# Patient Record
Sex: FEMALE | Race: WHITE | Employment: FULL TIME | URBAN - METROPOLITAN AREA
[De-identification: names, ages, dates, MRNs, and addresses within clinical notes are randomized per-mention and may not be internally consistent; named-entity substitution may affect disease eponyms.]

---

## 2017-03-10 DIAGNOSIS — G43.009 MIGRAINE WITHOUT AURA AND WITHOUT STATUS MIGRAINOSUS, NOT INTRACTABLE: ICD-10-CM

## 2017-03-10 RX ORDER — TOPIRAMATE 200 MG/1
TABLET ORAL
Qty: 75 TAB | Refills: 0 | Status: SHIPPED | OUTPATIENT
Start: 2017-03-10 | End: 2017-05-10 | Stop reason: SDUPTHER

## 2017-05-10 ENCOUNTER — OFFICE VISIT (OUTPATIENT)
Dept: NEUROLOGY | Age: 47
End: 2017-05-10

## 2017-05-10 VITALS
RESPIRATION RATE: 20 BRPM | BODY MASS INDEX: 34.22 KG/M2 | HEIGHT: 70 IN | WEIGHT: 239 LBS | SYSTOLIC BLOOD PRESSURE: 118 MMHG | DIASTOLIC BLOOD PRESSURE: 68 MMHG

## 2017-05-10 DIAGNOSIS — G43.009 MIGRAINE WITHOUT AURA AND WITHOUT STATUS MIGRAINOSUS, NOT INTRACTABLE: ICD-10-CM

## 2017-05-10 RX ORDER — ELETRIPTAN HYDROBROMIDE 40 MG/1
TABLET, FILM COATED ORAL
Qty: 36 TAB | Refills: 1 | Status: SHIPPED | OUTPATIENT
Start: 2017-05-10 | End: 2017-05-15 | Stop reason: SDUPTHER

## 2017-05-10 RX ORDER — TOPIRAMATE 200 MG/1
TABLET ORAL
Qty: 225 TAB | Refills: 1 | Status: SHIPPED | OUTPATIENT
Start: 2017-05-10 | End: 2017-05-15 | Stop reason: SDUPTHER

## 2017-05-10 NOTE — PROGRESS NOTES
Daniel Mathew is a 56 y.o. female who presents with the following  Chief Complaint   Patient presents with    Headache      2 headaches a month last 3 day       HPI Patient comes in for a follow up for migraines. Been having about 2 headaches a month lasting about 3 days each. They are located unilateral and on the left side. Pain is described as a sharp pain. She can get dizziness, light headed, nausea, but she works through them. She uses relpax to abortive therapy and this works well for the most part. She feels like her headaches are stable and have been like this for a while. Triggers are her lifestyle and job and she knows this. Allergies   Allergen Reactions    Topamax [Topiramate] Other (comments)     Low grade fever   Only with brand name ok with generic       Current Outpatient Prescriptions   Medication Sig    topiramate (TOPAMAX) 200 mg tablet TAKE 1 TABLET EVERY MORNINGAND TAKE 1 AND 1/2 TABLETS IN THE EVENING    eletriptan (RELPAX) 40 mg tablet TAKE 1 TABLET BY MOUTH ONCE AS NEEDED. MAY REPEAT IN 2 HOURS IF NEEDED    ferrous sulfate (IRON) 325 mg (65 mg iron) tablet Take  by mouth Daily (before breakfast).  HYDROcodone-acetaminophen (LORTAB) 5-500 mg per tablet Take  by mouth as needed.  multivitamin (ONE A DAY) tablet Take 1 tablet by mouth daily. No current facility-administered medications for this visit.         History   Smoking Status    Never Smoker   Smokeless Tobacco    Never Used       Past Medical History:   Diagnosis Date    Anemia     Back pain     Chronic pain     Headache     Headache     Stool color black        Past Surgical History:   Procedure Laterality Date    HX  SECTION      HX LUMBAR DISKECTOMY      L 5 S1    HX TUBAL LIGATION         Family History   Problem Relation Age of Onset    Suicide Father     Depression Father     Cancer Father      lung,    Depression Mother     Depression Sister     Hypertension Maternal Grandfather     Diabetes Maternal Grandfather     Cancer Paternal Grandmother      lung       Social History     Social History    Marital status:      Spouse name: N/A    Number of children: N/A    Years of education: N/A     Social History Main Topics    Smoking status: Never Smoker    Smokeless tobacco: Never Used    Alcohol use 1.5 oz/week     3 Glasses of wine per week      Comment: rare    Drug use: No    Sexual activity: Yes     Partners: Male     Birth control/ protection: Surgical     Other Topics Concern    None     Social History Narrative       Review of Systems   HENT: Negative for ear pain, hearing loss and tinnitus. Eyes: Positive for photophobia. Negative for blurred vision and double vision. Respiratory: Negative for shortness of breath and wheezing. Cardiovascular: Negative for chest pain and palpitations. Gastrointestinal: Positive for nausea. Negative for vomiting. Neurological: Positive for dizziness and headaches. Negative for weakness. Remainder of comprehensive review is negative. Physical Exam :    Visit Vitals    /68    Resp 20    Ht 5' 10\" (1.778 m)    Wt 108.4 kg (239 lb)    BMI 34.29 kg/m2       General: Well defined, nourished, and groomed individual in no acute distress.    Neck: Supple, nontender, no bruits, no pain with resistance to active range of motion.    Heart: Regular rate and rhythm, no murmurs, rub, or gallop. Normal S1S2. Lungs: Clear to auscultation bilaterally with equal chest expansion, no cough, no wheeze  Musculoskeletal: Extremities revealed no edema and had full range of motion of joints.    Psych: Good mood and bright affect    NEUROLOGICAL EXAMINATION:    Mental Status: Alert and oriented to person, place, and time    Cranial Nerves:    II, III, IV, VI: Visual acuity grossly intact. Visual fields are normal.    Pupils are equal, round, and reactive to light and accommodation.    Extra-ocular movements are full and fluid. Fundoscopic exam was benign, no ptosis or nystagmus.    V-XII: Hearing is grossly intact. Facial features are symmetric, with normal sensation and strength. The palate rises symmetrically and the tongue protrudes midline. Sternocleidomastoids 5/5. Motor Examination: Normal tone, bulk, and strength, 5/5 muscle strength throughout. Coordination: Finger to nose was normal. No resting or intention tremor    Gait and Station: Steady while walking. Normal arm swing. No pronator drift. No muscle wasting or fasiculations noted. Reflexes: DTRs 2+ throughout. Results for orders placed or performed during the hospital encounter of 07/31/16   D DIMER   Result Value Ref Range    D-dimer 0.60 0.00 - 0.65 mg/L FEU   TROPONIN I   Result Value Ref Range    Troponin-I, Qt. <0.04 <3.11 ng/mL   METABOLIC PANEL, COMPREHENSIVE   Result Value Ref Range    Sodium 141 136 - 145 mmol/L    Potassium 3.8 3.5 - 5.1 mmol/L    Chloride 109 (H) 97 - 108 mmol/L    CO2 22 21 - 32 mmol/L    Anion gap 10 5 - 15 mmol/L    Glucose 85 65 - 100 mg/dL    BUN 9 6 - 20 MG/DL    Creatinine 0.82 0.55 - 1.02 MG/DL    BUN/Creatinine ratio 11 (L) 12 - 20      GFR est AA >60 >60 ml/min/1.73m2    GFR est non-AA >60 >60 ml/min/1.73m2    Calcium 8.3 (L) 8.5 - 10.1 MG/DL    Bilirubin, total 0.1 (L) 0.2 - 1.0 MG/DL    ALT (SGPT) 21 12 - 78 U/L    AST (SGOT) 13 (L) 15 - 37 U/L    Alk.  phosphatase 80 45 - 117 U/L    Protein, total 6.9 6.4 - 8.2 g/dL    Albumin 3.3 (L) 3.5 - 5.0 g/dL    Globulin 3.6 2.0 - 4.0 g/dL    A-G Ratio 0.9 (L) 1.1 - 2.2     CBC WITH AUTOMATED DIFF   Result Value Ref Range    WBC 3.5 (L) 3.6 - 11.0 K/uL    RBC 4.27 3.80 - 5.20 M/uL    HGB 9.4 (L) 11.5 - 16.0 g/dL    HCT 32.1 (L) 35.0 - 47.0 %    MCV 75.2 (L) 80.0 - 99.0 FL    MCH 22.0 (L) 26.0 - 34.0 PG    MCHC 29.3 (L) 30.0 - 36.5 g/dL    RDW 15.7 (H) 11.5 - 14.5 %    PLATELET 818 807 - 812 K/uL    NEUTROPHILS 42 32 - 75 %    LYMPHOCYTES 47 12 - 49 %    MONOCYTES 9 5 - 13 % EOSINOPHILS 1 0 - 7 %    BASOPHILS 1 0 - 1 %    ABS. NEUTROPHILS 1.5 (L) 1.8 - 8.0 K/UL    ABS. LYMPHOCYTES 1.6 0.8 - 3.5 K/UL    ABS. MONOCYTES 0.3 0.0 - 1.0 K/UL    ABS. EOSINOPHILS 0.1 0.0 - 0.4 K/UL    ABS. BASOPHILS 0.0 0.0 - 0.1 K/UL   EKG, 12 LEAD, INITIAL   Result Value Ref Range    Ventricular Rate 78 BPM    Atrial Rate 78 BPM    P-R Interval 176 ms    QRS Duration 86 ms    Q-T Interval 386 ms    QTC Calculation (Bezet) 440 ms    Calculated P Axis 35 degrees    Calculated R Axis 7 degrees    Calculated T Axis 30 degrees    Diagnosis       Normal sinus rhythm  Normal ECG  When compared with ECG of 31-JUL-2016 11:49,  No significant change    Confirmed by Tomas Herrera MD ([id number]) on 7/31/2016 10:48:46 PM     EKG, 12 LEAD, INITIAL   Result Value Ref Range    Ventricular Rate 80 BPM    Atrial Rate 80 BPM    P-R Interval 164 ms    QRS Duration 86 ms    Q-T Interval 386 ms    QTC Calculation (Bezet) 445 ms    Calculated P Axis 30 degrees    Calculated R Axis 15 degrees    Calculated T Axis 49 degrees    Diagnosis       Normal sinus rhythm  Normal ECG  When compared with ECG of 30-AUG-2015 00:15,  No significant change was found  Confirmed by Kelvin Benjamin M.D., Nancyann Pointer ([id number]) on 8/1/2016 7:57:38 AM         Orders Placed This Encounter    topiramate (TOPAMAX) 200 mg tablet     Sig: TAKE 1 TABLET EVERY MORNINGAND TAKE 1 AND 1/2 TABLETS IN THE EVENING     Dispense:  225 Tab     Refill:  1    eletriptan (RELPAX) 40 mg tablet     Sig: TAKE 1 TABLET BY MOUTH ONCE AS NEEDED. MAY REPEAT IN 2 HOURS IF NEEDED     Dispense:  36 Tab     Refill:  1       1. Migraine without aura and without status migrainosus, not intractable        Follow-up Disposition:  Return in about 6 months (around 11/10/2017). Migraines doing well. Continue Topamax 200 mg in AM and 300 mg in PM for prevention. Been doing well. She has increased somewhat and this made her headaches worse. She will continue relpax for abortive therapy.  Continue to track headaches.          Maya Perez NP        This note will not be viewable in [address]

## 2017-05-10 NOTE — MR AVS SNAPSHOT
Visit Information Date & Time Provider Department Dept. Phone Encounter #  
 5/10/2017  8:30 AM Max Webber NP [facility] [phone] [id number] Follow-up Instructions Return in about 6 months (around 11/10/2017). Upcoming Health Maintenance Date Due  
 PAP AKA CERVICAL CYTOLOGY 5/1/1992 INFLUENZA AGE 9 TO ADULT 8/1/2017 DTaP/Tdap/Td series (2 - Td) 9/5/2022 Allergies as of 5/10/2017  Review Complete On: 8/11/2016 By: Chai Kerns LPN Severity Noted Reaction Type Reactions Topamax [Topiramate]  12/06/2010    Other (comments) Low grade fever Only with brand name ok with generic Current Immunizations  Reviewed on 1/9/2015 Name Date TDAP Vaccine 9/5/2012 Not reviewed this visit You Were Diagnosed With   
  
 Codes Comments Migraine without aura and without status migrainosus, not intractable     ICD-10-CM: Y21.578 ICD-9-CM: 346.10 Vitals BP Resp Height(growth percentile) Weight(growth percentile) BMI OB Status 118/68 20 5' 10\" (1.778 m) 239 lb (108.4 kg) 34.29 kg/m2 Having regular periods Smoking Status Never Smoker Vitals History BMI and BSA Data Body Mass Index Body Surface Area  
 34.29 kg/m 2 2.31 m 2 Preferred Pharmacy Pharmacy Name Phone [facility] [phone] Your Updated Medication List  
  
   
This list is accurate as of: 5/10/17  8:47 AM.  Always use your most recent med list.  
  
  
  
  
 eletriptan 40 mg tablet Commonly known as:  RELPAX TAKE 1 TABLET BY MOUTH ONCE AS NEEDED. MAY REPEAT IN 2 HOURS IF NEEDED Iron 325 mg (65 mg iron) tablet Generic drug:  ferrous sulfate Take  by mouth Daily (before breakfast). LORTAB 5-500 mg per tablet Generic drug:  HYDROcodone-acetaminophen Take  by mouth as needed. multivitamin tablet Commonly known as:  ONE A DAY  
 Take 1 tablet by mouth daily. topiramate 200 mg tablet Commonly known as:  TOPAMAX TAKE 1 TABLET EVERY MORNINGAND TAKE 1 AND 1/2 TABLETS IN THE EVENING Prescriptions Sent to Pharmacy Refills  
 topiramate (TOPAMAX) 200 mg tablet 1 Sig: TAKE 1 TABLET EVERY MORNINGAND TAKE 1 AND 1/2 TABLETS IN THE EVENING Class: Normal  
 Pharmacy: [address] Ph #: [phone]  
 eletriptan (RELPAX) 40 mg tablet 1 Sig: TAKE 1 TABLET BY MOUTH ONCE AS NEEDED. MAY REPEAT IN 2 HOURS IF NEEDED Class: Normal  
 Pharmacy: [address] Ph #: [phone] Follow-up Instructions Return in about 6 months (around 11/10/2017). Patient Instructions PRESCRIPTION REFILL POLICY [facility] Statement to Patients April 1, 2014 In an effort to ensure the large volume of patient prescription refills is processed in the most efficient and expeditious manner, we are asking our patients to assist us by calling your Pharmacy for all prescription refills, this will include also your  Mail Order Pharmacy. The pharmacy will contact our office electronically to continue the refill process. Please do not wait until the last minute to call your pharmacy. We need at least 48 hours (2days) to fill prescriptions. We also encourage you to call your pharmacy before going to  your prescription to make sure it is ready. With regard to controlled substance prescription refill requests (narcotic refills) that need to be picked up at our office, we ask your cooperation by providing us with at least 72 hours (3days) notice that you will need a refill. We will not refill narcotic prescription refill requests after 4:00pm on any weekday, Monday through Thursday, or after 2:00pm on Fridays, or on the weekends.   
  
We encourage everyone to explore another way of getting your prescription refill request processed using [employer], our patient web portal through our electronic medical record system. [employer] is an efficient and effective way to communicate your medication request directly to the office and  downloadable as an ashleigh on your smart phone . [employer] also features a review functionality that allows you to view your medication list as well as leave messages for your physician. Are you ready to get connected? If so please review the attatched instructions or speak to any of our staff to get you set up right away! Thank you so much for your cooperation. Should you have any questions please contact our Practice Administrator. The Physicians and Staff,  [facility] [phone] What is a living will? A living will is a legal form you use to write down the kind of care you want at the end of your life. It is used by the health professionals who will treat you if you aren't able to decide for yourself. If you put your wishes in writing, your loved ones and others will know what kind of care you want. They won't need to guess. This can ease your mind and be helpful to others. A living will is not the same as an estate or property will. An estate will explains what you want to happen with your money and property after you die. Is a living will a legal document? A living will is a legal document. Each state has its own laws about living christine. If you move to another state, make sure that your living will is legal in the state where you now live. Or you might use a universal form that has been approved by many states. This kind of form can sometimes be completed and stored online. Your electronic copy will then be available wherever you have a connection to the Internet. In most cases, doctors will respect your wishes even if you have a form from a different state. · You don't need an  to complete a living will.  But legal advice can be helpful if your state's laws are unclear, your health history is complicated, or your family can't agree on what should be in your living will. · You can change your living will at any time. Some people find that their wishes about end-of-life care change as their health changes. · In addition to making a living will, think about completing a medical power of  form. This form lets you name the person you want to make end-of-life treatment decisions for you (your \"health care agent\") if you're not able to. Many hospitals and nursing homes will give you the forms you need to complete a living will and a medical power of . · Your living will is used only if you can't make or communicate decisions for yourself anymore. If you become able to make decisions again, you can accept or refuse any treatment, no matter what you wrote in your living will. · Your state may offer an online registry. This is a place where you can store your living will online so the doctors and nurses who need to treat you can find it right away. What should you think about when creating a living will? Talk about your end-of-life wishes with your family members and your doctor. Let them know what you want. That way the people making decisions for you won't be surprised by your choices. Think about these questions as you make your living will: · Do you know enough about life support methods that might be used? If not, talk to your doctor so you know what might be done if you can't breathe on your own, your heart stops, or you're unable to swallow. · What things would you still want to be able to do after you receive life-support methods? Would you want to be able to walk? To speak? To eat on your own? To live without the help of machines? · If you have a choice, where do you want to be cared for? In your home? At a hospital or nursing home? · Do you want certain Mandaeism practices performed if you become very ill? · If you have a choice at the end of your life, where would you prefer to die? At home? In a hospital or nursing home? Somewhere else? · Would you prefer to be buried or cremated? · Do you want your organs to be donated after you die? What should you do with your living will? · Make sure that your family members and your health care agent have copies of your living will. · Give your doctor a copy of your living will to keep in your medical record. If you have more than one doctor, make sure that each one has a copy. · You may want to put a copy of your living will where it can be easily found. Where can you learn more? Go to http://shashank-elías.info/. Enter D225 in the search box to learn more about \"Learning About Living Ariana Fontana. \" Current as of: February 24, 2016 Content Version: 11.2 © 0702-0711 [employer]. Care instructions adapted under license by [employer] (which disclaims liability or warranty for this information). If you have questions about a medical condition or this instruction, always ask your healthcare professional. [place] [address] any warranty or liability for your use of this information. Advance Directives: Care Instructions Your Care Instructions An advance directive is a legal way to state your wishes at the end of your life. It tells your family and your doctor what to do if you can no longer say what you want. There are two main types of advance directives. You can change them any time that your wishes change. · A living will tells your family and your doctor your wishes about life support and other treatment. · A durable power of  for health care lets you name a person to make treatment decisions for you when you can't speak for yourself. This person is called a health care agent.  
If you do not have an advance directive, decisions about your medical care may be made by a doctor or a  who doesn't know you. It may help to think of an advance directive as a gift to the people who care for you. If you have one, they won't have to make tough decisions by themselves. Follow-up care is a key part of your treatment and safety. Be sure to make and go to all appointments, and call your doctor if you are having problems. It's also a good idea to know your test results and keep a list of the medicines you take. How can you care for yourself at home? · Discuss your wishes with your loved ones and your doctor. This way, there are no surprises. · Many states have a unique form. Or you might use a universal form that has been approved by many states. This kind of form can sometimes be completed and stored online. Your electronic copy will then be available wherever you have a connection to the Internet. In most cases, doctors will respect your wishes even if you have a form from a different state. · You don't need a  to do an advance directive. But you may want to get legal advice. · Think about these questions when you prepare an advance directive: ¨ Who do you want to make decisions about your medical care if you are not able to? Many people choose a family member or close friend. ¨ Do you know enough about life support methods that might be used? If not, talk to your doctor so you understand. ¨ What are you most afraid of that might happen? You might be afraid of having pain, losing your independence, or being kept alive by machines. ¨ Where would you prefer to die? Choices include your home, a hospital, or a nursing home. ¨ Would you like to have information about hospice care to support you and your family? ¨ Do you want to donate organs when you die? ¨ Do you want certain Congregational practices performed before you die? If so, put your wishes in the advance directive. · Read your advance directive every year, and make changes as needed. When should you call for help? Be sure to contact your doctor if you have any questions. Where can you learn more? Go to http://shashank-elías.info/. Enter R264 in the search box to learn more about \"Advance Directives: Care Instructions. \" Current as of: November 17, 2016 Content Version: 11.2 © 3429-2352 [employer]. Care instructions adapted under license by [employer] (which disclaims liability or warranty for this information). If you have questions about a medical condition or this instruction, always ask your healthcare professional. [address] any warranty or liability for your use of this information. Introducing [facility]! Dear Mark Duggan: 
Thank you for requesting a [employer] account. Our records indicate that you have previously registered for a [employer] account but its currently inactive. Please call our [employer] support line at [phone]. Additional Information If you have questions, please visit the Frequently Asked Questions section of the [employer] website at [email or web address] Remember, [employer] is NOT to be used for urgent needs. For medical emergencies, dial 911. Now available from your iPhone and Android! Please provide this summary of care documentation to your next provider. Your primary care clinician is listed as Paul Soto. If you have any questions after today's visit, please call [phone].

## 2017-05-10 NOTE — PATIENT INSTRUCTIONS
10 [facility]   Statement to Patients  April 1, 2014      In an effort to ensure the large volume of patient prescription refills is processed in the most efficient and expeditious manner, we are asking our patients to assist us by calling your Pharmacy for all prescription refills, this will include also your  Mail Order Pharmacy. The pharmacy will contact our office electronically to continue the refill process. Please do not wait until the last minute to call your pharmacy. We need at least 48 hours (2days) to fill prescriptions. We also encourage you to call your pharmacy before going to  your prescription to make sure it is ready. With regard to controlled substance prescription refill requests (narcotic refills) that need to be picked up at our office, we ask your cooperation by providing us with at least 72 hours (3days) notice that you will need a refill. We will not refill narcotic prescription refill requests after 4:00pm on any weekday, Monday through Thursday, or after 2:00pm on Fridays, or on the weekends. We encourage everyone to explore another way of getting your prescription refill request processed using [employer], our patient web portal through our electronic medical record system. [employer] is an efficient and effective way to communicate your medication request directly to the office and  downloadable as an ashleigh on your smart phone . [employer] also features a review functionality that allows you to view your medication list as well as leave messages for your physician. Are you ready to get connected? If so please review the attatched instructions or speak to any of our staff to get you set up right away! Thank you so much for your cooperation. Should you have any questions please contact our Practice Administrator. The Physicians and Staff,  [facility] [id number]  What is a living will?   A living will is a legal form you use to write down the kind of care you want at the end of your life. It is used by the health professionals who will treat you if you aren't able to decide for yourself. If you put your wishes in writing, your loved ones and others will know what kind of care you want. They won't need to guess. This can ease your mind and be helpful to others. A living will is not the same as an estate or property will. An estate will explains what you want to happen with your money and property after you die. Is a living will a legal document? A living will is a legal document. Each state has its own laws about living christine. If you move to another state, make sure that your living will is legal in the state where you now live. Or you might use a universal form that has been approved by many states. This kind of form can sometimes be completed and stored online. Your electronic copy will then be available wherever you have a connection to the Internet. In most cases, doctors will respect your wishes even if you have a form from a different state. · You don't need an  to complete a living will. But legal advice can be helpful if your state's laws are unclear, your health history is complicated, or your family can't agree on what should be in your living will. · You can change your living will at any time. Some people find that their wishes about end-of-life care change as their health changes. · In addition to making a living will, think about completing a medical power of  form. This form lets you name the person you want to make end-of-life treatment decisions for you (your \"health care agent\") if you're not able to. Many hospitals and nursing homes will give you the forms you need to complete a living will and a medical power of . · Your living will is used only if you can't make or communicate decisions for yourself anymore.  If you become able to make decisions again, you can accept or refuse any treatment, no matter what you wrote in your living will. · Your state may offer an online registry. This is a place where you can store your living will online so the doctors and nurses who need to treat you can find it right away. What should you think about when creating a living will? Talk about your end-of-life wishes with your family members and your doctor. Let them know what you want. That way the people making decisions for you won't be surprised by your choices. Think about these questions as you make your living will:  · Do you know enough about life support methods that might be used? If not, talk to your doctor so you know what might be done if you can't breathe on your own, your heart stops, or you're unable to swallow. · What things would you still want to be able to do after you receive life-support methods? Would you want to be able to walk? To speak? To eat on your own? To live without the help of machines? · If you have a choice, where do you want to be cared for? In your home? At a hospital or nursing home? · Do you want certain Restorationist practices performed if you become very ill? · If you have a choice at the end of your life, where would you prefer to die? At home? In a hospital or nursing home? Somewhere else? · Would you prefer to be buried or cremated? · Do you want your organs to be donated after you die? What should you do with your living will? · Make sure that your family members and your health care agent have copies of your living will. · Give your doctor a copy of your living will to keep in your medical record. If you have more than one doctor, make sure that each one has a copy. · You may want to put a copy of your living will where it can be easily found. Where can you learn more? Go to http://shashank-elías.info/. Enter O235 in the search box to learn more about \"Learning About Living Pernilda. \"  Current as of: February 24, 2016  Content Version: 11.2  © 0697-4594 [employer]. Care instructions adapted under license by [employer] (which disclaims liability or warranty for this information). If you have questions about a medical condition or this instruction, always ask your healthcare professional. [facility] any warranty or liability for your use of this information. Advance Directives: Care Instructions  Your Care Instructions  An advance directive is a legal way to state your wishes at the end of your life. It tells your family and your doctor what to do if you can no longer say what you want. There are two main types of advance directives. You can change them any time that your wishes change. · A living will tells your family and your doctor your wishes about life support and other treatment. · A durable power of  for health care lets you name a person to make treatment decisions for you when you can't speak for yourself. This person is called a health care agent. If you do not have an advance directive, decisions about your medical care may be made by a doctor or a  who doesn't know you. It may help to think of an advance directive as a gift to the people who care for you. If you have one, they won't have to make tough decisions by themselves. Follow-up care is a key part of your treatment and safety. Be sure to make and go to all appointments, and call your doctor if you are having problems. It's also a good idea to know your test results and keep a list of the medicines you take. How can you care for yourself at home? · Discuss your wishes with your loved ones and your doctor. This way, there are no surprises. · Many states have a unique form. Or you might use a universal form that has been approved by many states. This kind of form can sometimes be completed and stored online.  Your electronic copy will then be available wherever you have a connection to the Internet. In most cases, doctors will respect your wishes even if you have a form from a different state. · You don't need a  to do an advance directive. But you may want to get legal advice. · Think about these questions when you prepare an advance directive:  ¨ Who do you want to make decisions about your medical care if you are not able to? Many people choose a family member or close friend. ¨ Do you know enough about life support methods that might be used? If not, talk to your doctor so you understand. ¨ What are you most afraid of that might happen? You might be afraid of having pain, losing your independence, or being kept alive by machines. ¨ Where would you prefer to die? Choices include your home, a hospital, or a nursing home. ¨ Would you like to have information about hospice care to support you and your family? ¨ Do you want to donate organs when you die? ¨ Do you want certain Buddhism practices performed before you die? If so, put your wishes in the advance directive. · Read your advance directive every year, and make changes as needed. When should you call for help? Be sure to contact your doctor if you have any questions. Where can you learn more? Go to http://shashank-elías.info/. Enter R264 in the search box to learn more about \"Advance Directives: Care Instructions. \"  Current as of: November 17, 2016  Content Version: 11.2  © 0654-7383 [employer]. Care instructions adapted under license by [employer] (which disclaims liability or warranty for this information). If you have questions about a medical condition or this instruction, always ask your healthcare professional. [address] any warranty or liability for your use of this information.

## 2017-05-15 ENCOUNTER — TELEPHONE (OUTPATIENT)
Dept: NEUROLOGY | Age: 47
End: 2017-05-15

## 2017-05-15 DIAGNOSIS — G43.009 MIGRAINE WITHOUT AURA AND WITHOUT STATUS MIGRAINOSUS, NOT INTRACTABLE: ICD-10-CM

## 2017-05-15 RX ORDER — ELETRIPTAN HYDROBROMIDE 40 MG/1
TABLET, FILM COATED ORAL
Qty: 36 TAB | Refills: 1 | Status: SHIPPED | OUTPATIENT
Start: 2017-05-15 | End: 2017-05-18 | Stop reason: SDUPTHER

## 2017-05-15 RX ORDER — TOPIRAMATE 200 MG/1
TABLET ORAL
Qty: 225 TAB | Refills: 1 | Status: ON HOLD | OUTPATIENT
Start: 2017-05-15 | End: 2018-04-23

## 2017-05-15 NOTE — TELEPHONE ENCOUNTER
Attempted to call unsuccessful message left on vm relaying scripts went to mail order pharmacy if would like for them to go to local please return this call to let NP know

## 2017-05-15 NOTE — TELEPHONE ENCOUNTER
Pt is returning edie's call. Preferred that the script is called into her local [facility] not [facility]. Please call.

## 2017-05-15 NOTE — TELEPHONE ENCOUNTER
----- Message from Kacy Magallanes sent at 5/15/2017 12:50 PM EDT -----  Regarding: Dr. Raissa Nicole refill  Patient was in last week and was prescribed 2 Rx's. 'Topamax' and 'Wellpack'. She went to her local [facility] and they do not have record of either of them. She would like a call back today at [phone] to see which pharmacy they were called into.

## 2017-05-18 DIAGNOSIS — G43.009 MIGRAINE WITHOUT AURA AND WITHOUT STATUS MIGRAINOSUS, NOT INTRACTABLE: ICD-10-CM

## 2017-05-18 RX ORDER — ELETRIPTAN HYDROBROMIDE 40 MG/1
TABLET, FILM COATED ORAL
Qty: 9 TAB | Refills: 5 | Status: SHIPPED | OUTPATIENT
Start: 2017-05-18 | End: 2017-11-07 | Stop reason: SDUPTHER

## 2017-09-30 DIAGNOSIS — G43.009 MIGRAINE WITHOUT AURA AND WITHOUT STATUS MIGRAINOSUS, NOT INTRACTABLE: ICD-10-CM

## 2017-10-02 RX ORDER — TOPIRAMATE 200 MG/1
TABLET ORAL
Qty: 225 TAB | Refills: 1 | Status: SHIPPED | OUTPATIENT
Start: 2017-10-02 | End: 2017-11-07 | Stop reason: SDUPTHER

## 2017-11-07 ENCOUNTER — OFFICE VISIT (OUTPATIENT)
Dept: NEUROLOGY | Age: 47
End: 2017-11-07

## 2017-11-07 VITALS
TEMPERATURE: 98.6 F | HEIGHT: 70 IN | HEART RATE: 72 BPM | RESPIRATION RATE: 20 BRPM | BODY MASS INDEX: 36.31 KG/M2 | WEIGHT: 253.6 LBS | SYSTOLIC BLOOD PRESSURE: 114 MMHG | DIASTOLIC BLOOD PRESSURE: 72 MMHG | OXYGEN SATURATION: 98 %

## 2017-11-07 DIAGNOSIS — G43.009 MIGRAINE WITHOUT AURA AND WITHOUT STATUS MIGRAINOSUS, NOT INTRACTABLE: Primary | ICD-10-CM

## 2017-11-07 RX ORDER — ELETRIPTAN HYDROBROMIDE 40 MG/1
TABLET, FILM COATED ORAL
Qty: 9 TAB | Refills: 5 | Status: SHIPPED | OUTPATIENT
Start: 2017-11-07 | End: 2018-05-08 | Stop reason: SDUPTHER

## 2017-11-07 NOTE — PROGRESS NOTES
Neurology Consult      Subjective:      Asher Haque is a 56 y.o. female who returns with long-established chronic migraines. Is on topiramate 500 mg daily and Relpax and prefers branded as she works for [employer]. Has been routinely taking Aleve or similar medicines with the Relpax at moment 0. Unfortunately in recent times has discovered even with that type of combination that her headaches to do occur are more intense and persistent. Not sure there is any accounting for that change of events. On her most recent Relpax prescription I believe he was generated as a generic because the drug has recently become generic. Will definitely stipulate dispense as written from now on. Suggested we consider the nasal rescue remedy Sprix which would be a much more rapid pain reliever then oral anti-inflammatories. Has been warned not to take it in the same 24 hours with another anti-inflammatory. The only other reasonable possibility would be consider a different rescue such as Zembrace which would have an even more rapid onset of action to it. We talked about the drug Trokendi as a sustained-release form of topiramate which in turn may be a more efficient way to manage headache prevention. She will think about this. Revisit 6 months. Exam otherwise normal.         Current Outpatient Prescriptions   Medication Sig Dispense Refill    levonorgestrel (MIRENA) 20 mcg/24 hr (5 years) IUD 1 Each by IntraUTERine route once.  eletriptan (RELPAX) 40 mg tablet TAKE 1 TABLET BY MOUTH ONCE AS NEEDED. MAY REPEAT IN 2 HOURS IF NEEDED 9 Tab 5    topiramate (TOPAMAX) 200 mg tablet TAKE 1 TABLET EVERY MORNINGAND TAKE 1 AND 1/2 TABLETS IN THE EVENING 225 Tab 1    ferrous sulfate (IRON) 325 mg (65 mg iron) tablet Take  by mouth Daily (before breakfast).  HYDROcodone-acetaminophen (LORTAB) 5-500 mg per tablet Take  by mouth as needed.  multivitamin (ONE A DAY) tablet Take 1 tablet by mouth daily.         Allergies Allergen Reactions    Topamax [Topiramate] Other (comments)     Low grade fever   Only with brand name ok with generic     Past Medical History:   Diagnosis Date    Anemia     Back pain     Chronic pain     Headache     Headache(784.0)     Stool color black       Past Surgical History:   Procedure Laterality Date    HX  SECTION      HX LUMBAR DISKECTOMY  2004    L 5 S1    HX TUBAL LIGATION        Social History     Social History    Marital status:      Spouse name: N/A    Number of children: N/A    Years of education: N/A     Occupational History    Not on file. Social History Main Topics    Smoking status: Never Smoker    Smokeless tobacco: Never Used    Alcohol use 1.5 oz/week     3 Glasses of wine per week      Comment: rare    Drug use: No    Sexual activity: Yes     Partners: Male     Birth control/ protection: Surgical     Other Topics Concern    Not on file     Social History Narrative      Family History   Problem Relation Age of Onset    Suicide Father     Depression Father     Cancer Father      lung,    Depression Mother     Depression Sister     Hypertension Maternal Grandfather     Diabetes Maternal Grandfather     Cancer Paternal Grandmother      lung      Visit Vitals    /72    Pulse 72    Temp 98.6 °F (37 °C) (Oral)    Resp 20    Ht 5' 10\" (1.778 m)    Wt 115 kg (253 lb 9.6 oz)    SpO2 98%    BMI 36.39 kg/m2        Review of Systems:   A comprehensive review of systems was negative except for that written in the HPI. Neuro Exam:     Appearance: The patient is well developed, well nourished, provides a coherent history and is in no acute distress. Mental Status: Oriented to time, place and person. Mood and affect appropriate. Cranial Nerves:   Intact visual fields. Fundi are benign. MONSTER, EOM's full, no nystagmus, no ptosis. Facial sensation is normal. Corneal reflexes are intact. Facial movement is symmetric.  Hearing is normal bilaterally. Palate is midline with normal sternocleidomastoid and trapezius muscles are normal. Tongue is midline. Motor:  5/5 strength in upper and lower proximal and distal muscles. Normal bulk and tone. No fasciculations. Reflexes:   Deep tendon reflexes 2+/4 and symmetrical.   Sensory:   Normal to touch, pinprick and vibration. Gait:  Normal gait. Tremor:   No tremor noted. Cerebellar:  No cerebellar signs present. Neurovascular:  Normal heart sounds and regular rhythm, peripheral pulses intact, and no carotid bruits. Assessment:   Migraine headaches. Will try the nasal Sprix agent for acute rescue and not to take other anti-inflammatories in the same day. This may be a more efficient option and quicker to pain improvement remedy. Will continue the Topamax and Relpax as prescribed. Will renew the Relpax as dispense as written per request.  Continue to monitor headaches and revisit 6 months. Consider zembrace as a more rapid rescue for future reference? Plan:   Revisit 6 months.   Signed by :  Bebo Franklin MD

## 2017-11-07 NOTE — MR AVS SNAPSHOT
Visit Information Date & Time Provider Department Dept. Phone Encounter #  
 11/7/2017  8:40 AM MD Loraine Pereira [facility] Neurology [facility] [phone] [id number] Follow-up Instructions Return in about 6 months (around 5/7/2018). Upcoming Health Maintenance Date Due  
 PAP AKA CERVICAL CYTOLOGY 5/1/1992 INFLUENZA AGE 9 TO ADULT 8/1/2017 DTaP/Tdap/Td series (2 - Td) 9/5/2022 Allergies as of 11/7/2017  Review Complete On: 11/7/2017 By: Adriel Ramos MD  
  
 Severity Noted Reaction Type Reactions Topamax [Topiramate]  12/06/2010    Other (comments) Low grade fever Only with brand name ok with generic Current Immunizations  Reviewed on 1/9/2015 Name Date TDAP Vaccine 9/5/2012 Not reviewed this visit You Were Diagnosed With   
  
 Codes Comments Migraine without aura and without status migrainosus, not intractable    -  Primary ICD-10-CM: G43.009 ICD-9-CM: 346.10 Vitals BP Pulse Temp Resp Height(growth percentile) Weight(growth percentile) 114/72 72 98.6 °F (37 °C) (Oral) 20 5' 10\" (1.778 m) 253 lb 9.6 oz (115 kg) SpO2 BMI OB Status Smoking Status 98% 36.39 kg/m2 IUD Never Smoker Vitals History BMI and BSA Data Body Mass Index Body Surface Area  
 36.39 kg/m 2 2.38 m 2 Preferred Pharmacy Pharmacy Name Phone [id number] [address] [facility] [phone] Your Updated Medication List  
  
   
This list is accurate as of: 11/7/17  9:05 AM.  Always use your most recent med list.  
  
  
  
  
 Iron 325 mg (65 mg iron) tablet Generic drug:  ferrous sulfate Take  by mouth Daily (before breakfast). LORTAB 5-500 mg per tablet Generic drug:  HYDROcodone-acetaminophen Take  by mouth as needed. MIRENA 20 mcg/24 hr (5 years) IUD Generic drug:  levonorgestrel 1 Each by IntraUTERine route once. multivitamin tablet Commonly known as:  ONE A DAY Take 1 tablet by mouth daily. RELPAX 40 mg tablet Generic drug:  eletriptan TAKE 1 TABLET BY MOUTH ONCE AS NEEDED. MAY REPEAT IN 2 HOURS IF NEEDED  
  
 topiramate 200 mg tablet Commonly known as:  TOPAMAX TAKE 1 TABLET EVERY MORNINGAND TAKE 1 AND 1/2 TABLETS IN THE EVENING Prescriptions Sent to Pharmacy Refills RELPAX 40 mg tablet 5 Sig: TAKE 1 TABLET BY MOUTH ONCE AS NEEDED. MAY REPEAT IN 2 HOURS IF NEEDED Class: Normal  
 Pharmacy: [address] [place] [address] #: [phone] Follow-up Instructions Return in about 6 months (around 5/7/2018). Patient Instructions PRESCRIPTION REFILL POLICY [facility] Statement to Patients April 1, 2014 In an effort to ensure the large volume of patient prescription refills is processed in the most efficient and expeditious manner, we are asking our patients to assist us by calling your Pharmacy for all prescription refills, this will include also your  Mail Order Pharmacy. The pharmacy will contact our office electronically to continue the refill process. Please do not wait until the last minute to call your pharmacy. We need at least 48 hours (2days) to fill prescriptions. We also encourage you to call your pharmacy before going to  your prescription to make sure it is ready. With regard to controlled substance prescription refill requests (narcotic refills) that need to be picked up at our office, we ask your cooperation by providing us with at least 72 hours (3days) notice that you will need a refill. We will not refill narcotic prescription refill requests after 4:00pm on any weekday, Monday through Thursday, or after 2:00pm on Fridays, or on the weekends.   
  
We encourage everyone to explore another way of getting your prescription refill request processed using [facility], our patient web portal through our electronic medical record system. [facility] is an efficient and effective way to communicate your medication request directly to the office and  downloadable as an ashleigh on your smart phone . [facility] also features a review functionality that allows you to view your medication list as well as leave messages for your physician. Are you ready to get connected? If so please review the attatched instructions or speak to any of our staff to get you set up right away! Thank you so much for your cooperation. Should you have any questions please contact our Practice Administrator. The Physicians and Staff,  [facility] [address] What is a living will? A living will is a legal form you use to write down the kind of care you want at the end of your life. It is used by the health professionals who will treat you if you aren't able to decide for yourself. If you put your wishes in writing, your loved ones and others will know what kind of care you want. They won't need to guess. This can ease your mind and be helpful to others. A living will is not the same as an estate or property will. An estate will explains what you want to happen with your money and property after you die. Is a living will a legal document? A living will is a legal document. Each state has its own laws about living christine. If you move to another state, make sure that your living will is legal in the state where you now live. Or you might use a universal form that has been approved by many states. This kind of form can sometimes be completed and stored online. Your electronic copy will then be available wherever you have a connection to the Internet. In most cases, doctors will respect your wishes even if you have a form from a different state. · You don't need an  to complete a living will.  But legal advice can be helpful if your state's laws are unclear, your health history is complicated, or your family can't agree on what should be in your living will. · You can change your living will at any time. Some people find that their wishes about end-of-life care change as their health changes. · In addition to making a living will, think about completing a medical power of  form. This form lets you name the person you want to make end-of-life treatment decisions for you (your \"health care agent\") if you're not able to. Many hospitals and nursing homes will give you the forms you need to complete a living will and a medical power of . · Your living will is used only if you can't make or communicate decisions for yourself anymore. If you become able to make decisions again, you can accept or refuse any treatment, no matter what you wrote in your living will. · Your state may offer an online registry. This is a place where you can store your living will online so the doctors and nurses who need to treat you can find it right away. What should you think about when creating a living will? Talk about your end-of-life wishes with your family members and your doctor. Let them know what you want. That way the people making decisions for you won't be surprised by your choices. Think about these questions as you make your living will: · Do you know enough about life support methods that might be used? If not, talk to your doctor so you know what might be done if you can't breathe on your own, your heart stops, or you're unable to swallow. · What things would you still want to be able to do after you receive life-support methods? Would you want to be able to walk? To speak? To eat on your own? To live without the help of machines? · If you have a choice, where do you want to be cared for? In your home? At a hospital or nursing home? · Do you want certain Bahai practices performed if you become very ill? · If you have a choice at the end of your life, where would you prefer to die? At home? In a hospital or nursing home? Somewhere else? · Would you prefer to be buried or cremated? · Do you want your organs to be donated after you die? What should you do with your living will? · Make sure that your family members and your health care agent have copies of your living will. · Give your doctor a copy of your living will to keep in your medical record. If you have more than one doctor, make sure that each one has a copy. · You may want to put a copy of your living will where it can be easily found. Where can you learn more? Go to http://shashank-elías.info/. Enter N556 in the search box to learn more about \"Learning About Living Perromanuel. \" Current as of: September 24, 2016 Content Version: 11.4 © 5752-5647 [employer] (air taxi). Care instructions adapted under license by [employer] (which disclaims liability or warranty for this information). If you have questions about a medical condition or this instruction, always ask your healthcare professional. [place] [address] any warranty or liability for your use of this information. Patient history reviewed and patient examined. Will try the nasal agent Sprix as a rescue and see if he gives her a better treatment option with her headaches. Not to be taking with other anti-inflammatories in the same day. Continue Topamax and Relpax as is. Revisit 6 months. Introducing [facility] & HEALTH SERVICES! Dear Anali Wilkinson: 
Thank you for requesting a [employer] account. Our records indicate that you have previously registered for a [employer] account but its currently inactive. Please call our [employer] support line at [phone]. Additional Information If you have questions, please visit the Frequently Asked Questions section of the [employer] website at [email or web address] Remember, MyChart is NOT to be used for urgent needs. For medical emergencies, dial 911. Now available from your iPhone and Android! Please provide this summary of care documentation to your next provider. Your primary care clinician is listed as Malaika Trammell. If you have any questions after today's visit, please call [phone].

## 2017-11-07 NOTE — PATIENT INSTRUCTIONS
[facility]   Statement to Patients  April 1, 2014      In an effort to ensure the large volume of patient prescription refills is processed in the most efficient and expeditious manner, we are asking our patients to assist us by calling your Pharmacy for all prescription refills, this will include also your  Mail Order Pharmacy. The pharmacy will contact our office electronically to continue the refill process. Please do not wait until the last minute to call your pharmacy. We need at least 48 hours (2days) to fill prescriptions. We also encourage you to call your pharmacy before going to  your prescription to make sure it is ready. With regard to controlled substance prescription refill requests (narcotic refills) that need to be picked up at our office, we ask your cooperation by providing us with at least 72 hours (3days) notice that you will need a refill. We will not refill narcotic prescription refill requests after 4:00pm on any weekday, Monday through Thursday, or after 2:00pm on Fridays, or on the weekends. We encourage everyone to explore another way of getting your prescription refill request processed using [employer], our patient web portal through our electronic medical record system. [employer] is an efficient and effective way to communicate your medication request directly to the office and  downloadable as an ashleigh on your smart phone . [employer] also features a review functionality that allows you to view your medication list as well as leave messages for your physician. Are you ready to get connected? If so please review the attatched instructions or speak to any of our staff to get you set up right away! Thank you so much for your cooperation. Should you have any questions please contact our Practice Administrator.     The Physicians and Staff,  [facility] Neurology [address]  What is a living will?    A living will is a legal form you use to write down the kind of care you want at the end of your life. It is used by the health professionals who will treat you if you aren't able to decide for yourself. If you put your wishes in writing, your loved ones and others will know what kind of care you want. They won't need to guess. This can ease your mind and be helpful to others. A living will is not the same as an estate or property will. An estate will explains what you want to happen with your money and property after you die. Is a living will a legal document? A living will is a legal document. Each state has its own laws about living christine. If you move to another state, make sure that your living will is legal in the state where you now live. Or you might use a universal form that has been approved by many states. This kind of form can sometimes be completed and stored online. Your electronic copy will then be available wherever you have a connection to the Internet. In most cases, doctors will respect your wishes even if you have a form from a different state. · You don't need an  to complete a living will. But legal advice can be helpful if your state's laws are unclear, your health history is complicated, or your family can't agree on what should be in your living will. · You can change your living will at any time. Some people find that their wishes about end-of-life care change as their health changes. · In addition to making a living will, think about completing a medical power of  form. This form lets you name the person you want to make end-of-life treatment decisions for you (your \"health care agent\") if you're not able to. Many hospitals and nursing homes will give you the forms you need to complete a living will and a medical power of . · Your living will is used only if you can't make or communicate decisions for yourself anymore.  If you become able to make decisions again, you can accept or refuse any treatment, no matter what you wrote in your living will. · Your state may offer an online registry. This is a place where you can store your living will online so the doctors and nurses who need to treat you can find it right away. What should you think about when creating a living will? Talk about your end-of-life wishes with your family members and your doctor. Let them know what you want. That way the people making decisions for you won't be surprised by your choices. Think about these questions as you make your living will:  · Do you know enough about life support methods that might be used? If not, talk to your doctor so you know what might be done if you can't breathe on your own, your heart stops, or you're unable to swallow. · What things would you still want to be able to do after you receive life-support methods? Would you want to be able to walk? To speak? To eat on your own? To live without the help of machines? · If you have a choice, where do you want to be cared for? In your home? At a hospital or nursing home? · Do you want certain Samaritan practices performed if you become very ill? · If you have a choice at the end of your life, where would you prefer to die? At home? In a hospital or nursing home? Somewhere else? · Would you prefer to be buried or cremated? · Do you want your organs to be donated after you die? What should you do with your living will? · Make sure that your family members and your health care agent have copies of your living will. · Give your doctor a copy of your living will to keep in your medical record. If you have more than one doctor, make sure that each one has a copy. · You may want to put a copy of your living will where it can be easily found. Where can you learn more? Go to http://shashank-elías.info/. Enter Q134 in the search box to learn more about \"Learning About Living Dena Hooks. \"  Current as of: September 24, 2016  Content Version: 11.4  © 7418-7490 [employer]. Care instructions adapted under license by [employer] (which disclaims liability or warranty for this information). If you have questions about a medical condition or this instruction, always ask your healthcare professional. [address] any warranty or liability for your use of this information. Patient history reviewed and patient examined. Will try the nasal agent Sprix as a rescue and see if he gives her a better treatment option with her headaches. Not to be taking with other anti-inflammatories in the same day. Continue Topamax and Relpax as is. Revisit 6 months.

## 2017-12-07 ENCOUNTER — OFFICE VISIT (OUTPATIENT)
Dept: INTERNAL MEDICINE CLINIC | Age: 47
End: 2017-12-07

## 2017-12-07 VITALS
DIASTOLIC BLOOD PRESSURE: 78 MMHG | BODY MASS INDEX: 36.36 KG/M2 | HEIGHT: 70 IN | TEMPERATURE: 98.3 F | SYSTOLIC BLOOD PRESSURE: 128 MMHG | HEART RATE: 72 BPM | WEIGHT: 254 LBS | RESPIRATION RATE: 16 BRPM | OXYGEN SATURATION: 98 %

## 2017-12-07 DIAGNOSIS — Z23 ENCOUNTER FOR IMMUNIZATION: ICD-10-CM

## 2017-12-07 DIAGNOSIS — R53.83 FATIGUE, UNSPECIFIED TYPE: Primary | ICD-10-CM

## 2017-12-07 DIAGNOSIS — N93.8 DUB (DYSFUNCTIONAL UTERINE BLEEDING): ICD-10-CM

## 2017-12-07 DIAGNOSIS — D64.9 ANEMIA, UNSPECIFIED TYPE: ICD-10-CM

## 2017-12-07 RX ORDER — KETOROLAC TROMETHAMINE 15.75 MG/1
SPRAY, METERED NASAL
COMMUNITY
Start: 2017-11-09 | End: 2018-04-13

## 2017-12-07 NOTE — PROGRESS NOTES
Asher Haque is a 56 y.o. female who presents today for Fatigue  . She has a history of   Patient Active Problem List   Diagnosis Code    Migraines G43.909    Lumbar disc disorder M51.9   . Today patient is here for an acute visit. Would like a flu shot    Problem visit:  Asher Haque is here for complaint of fatigue/exhaustion. Problem began 2 month(s) ago. Severity is bad and worsening. Character of problem: Fatigued during the morning and worsening as the day goes on. Working full time, but a bit less.  states that she is sleeping pretty well. No recent large stressor. Notes some dizziness. Feeling cold. No Ice craving. Hungry often. Unchanged in physical activity. She has a history of Fe deficiency. DUB better with Mirena. Still on PO Iron    ROS  Review of Systems   Constitutional: Positive for malaise/fatigue. Negative for chills, fever and weight loss. HENT: Positive for hearing loss. Negative for ear discharge, ear pain, nosebleeds and tinnitus. Eyes: Positive for blurred vision. Negative for double vision, photophobia and pain. Respiratory: Negative for cough, sputum production and shortness of breath. Cardiovascular: Negative for chest pain, palpitations, orthopnea, claudication and leg swelling. Gastrointestinal: Negative for abdominal pain, constipation, diarrhea, heartburn, nausea and vomiting. Genitourinary: Negative for dysuria, frequency, hematuria and urgency. Skin: Negative for itching and rash. Neurological: Negative. Endo/Heme/Allergies: Does not bruise/bleed easily. Psychiatric/Behavioral: Negative for depression, hallucinations, substance abuse and suicidal ideas. The patient is not nervous/anxious.         Visit Vitals    /78 (BP 1 Location: Left arm, BP Patient Position: Sitting)    Pulse 72    Temp 98.3 °F (36.8 °C) (Oral)    Resp 16    Ht 5' 10\" (1.778 m)    Wt 254 lb (115.2 kg)    SpO2 98%    BMI 36.45 kg/m2 Physical Exam   Constitutional: She is oriented to person, place, and time. She appears well-developed and well-nourished. HENT:   Head: Normocephalic and atraumatic. Cardiovascular: Normal rate and regular rhythm. No murmur heard. Pulmonary/Chest: Effort normal. No respiratory distress. Neurological: She is alert and oriented to person, place, and time. Skin: Skin is warm and dry. Psychiatric: She has a normal mood and affect. Her behavior is normal.         Current Outpatient Prescriptions   Medication Sig    levonorgestrel (MIRENA) 20 mcg/24 hr (5 years) IUD 1 Each by IntraUTERine route once.  RELPAX 40 mg tablet TAKE 1 TABLET BY MOUTH ONCE AS NEEDED. MAY REPEAT IN 2 HOURS IF NEEDED    topiramate (TOPAMAX) 200 mg tablet TAKE 1 TABLET EVERY MORNINGAND TAKE 1 AND 1/2 TABLETS IN THE EVENING    ferrous sulfate (IRON) 325 mg (65 mg iron) tablet Take  by mouth Daily (before breakfast).  multivitamin (ONE A DAY) tablet Take 1 tablet by mouth daily.  HYDROcodone-acetaminophen (LORTAB) 5-500 mg per tablet Take  by mouth as needed.  SPRIX 15.75 mg/spray spry      No current facility-administered medications for this visit.          Past Medical History:   Diagnosis Date    Anemia     Back pain     Chronic pain     Headache     Headache(784.0)     Stool color black       Past Surgical History:   Procedure Laterality Date    HX  SECTION      HX LUMBAR DISKECTOMY      L 5 S1    HX TUBAL LIGATION        Social History   Substance Use Topics    Smoking status: Never Smoker    Smokeless tobacco: Never Used    Alcohol use 1.5 oz/week     3 Glasses of wine per week      Comment: rare      Family History   Problem Relation Age of Onset    Suicide Father     Depression Father     Cancer Father      lung,    Depression Mother     Depression Sister     Hypertension Maternal Grandfather     Diabetes Maternal Grandfather     Cancer Paternal Grandmother      lung Allergies   Allergen Reactions    Topamax [Topiramate] Other (comments)     Low grade fever   Only with brand name ok with generic        Assessment/Plan  Diagnoses and all orders for this visit:    1. Fatigue, unspecified type - No significant psychosocial stressors. Pt with + family history of thyroid. R/o. B12 as well. R/o supra therapeutic Topamax. Over 50% of a visit of 25 minutes spent reviewing diagnosis and treatment options and side effects of medicines. -     VITAMIN L12  -     METABOLIC PANEL, COMPREHENSIVE  -     TSH 3RD GENERATION  -     TOPIRAMATE  -     CBC WITH AUTOMATED DIFF  -     IRON PROFILE  -     FERRITIN    2. DUB (dysfunctional uterine bleeding)  -     VITAMIN P73  -     METABOLIC PANEL, COMPREHENSIVE  -     TSH 3RD GENERATION  -     TOPIRAMATE  -     CBC WITH AUTOMATED DIFF  -     IRON PROFILE  -     FERRITIN    3. Anemia, unspecified type  -     VITAMIN K06  -     METABOLIC PANEL, COMPREHENSIVE  -     TSH 3RD GENERATION  -     TOPIRAMATE  -     CBC WITH AUTOMATED DIFF  -     IRON PROFILE  -     FERRITIN    4. Encounter for immunization  -     INFLUENZA VIRUS VACCINE QUADRIVALENT, PRESERVATIVE FREE SYRINGE (27546)  -     AK IMMUNIZ ADMIN,1 SINGLE/COMB VAC/TOXOID        Follow-up Disposition:  Return if symptoms worsen or fail to improve.     Elia Scott MD  12/7/2017

## 2017-12-07 NOTE — MR AVS SNAPSHOT
This list is accurate as of: 12/7/17  4:13 PM.  Always use your most recent med list.  
  
  
  
  
 Iron 325 mg (65 mg iron) tablet Generic drug:  ferrous sulfate Take  by mouth Daily (before breakfast). LORTAB 5-500 mg per tablet Generic drug:  HYDROcodone-acetaminophen Take  by mouth as needed. MIRENA 20 mcg/24 hr (5 years) Iud  
Generic drug:  levonorgestrel 1 Each by IntraUTERine route once. multivitamin tablet Commonly known as:  ONE A DAY Take 1 tablet by mouth daily. RELPAX 40 mg tablet Generic drug:  eletriptan TAKE 1 TABLET BY MOUTH ONCE AS NEEDED. MAY REPEAT IN 2 HOURS IF NEEDED  
  
 SPRIX 15.75 mg/spray Spry Generic drug:  ketorolac  
  
 topiramate 200 mg tablet Commonly known as:  TOPAMAX TAKE 1 TABLET EVERY MORNINGAND TAKE 1 AND 1/2 TABLETS IN THE EVENING We Performed the Following CBC WITH AUTOMATED DIFF [68823 CPT(R)] FERRITIN [04392 CPT(R)] IRON PROFILE [id number] CPT(R)] METABOLIC PANEL, COMPREHENSIVE [12380 CPT(R)] TOPIRAMATE [94262 CPT(R)] TSH 3RD GENERATION [99395 CPT(R)] VITAMIN B12 [id number] CPT(R)] Introducing [facility] & HEALTH SERVICES! Dear Rubén Gaytan: 
Thank you for requesting a [employer] account. Our records indicate that you have previously registered for a [employer] account but its currently inactive. Please call our [employer] support line at [phone]. Additional Information If you have questions, please visit the Frequently Asked Questions section of the [employer] website at [email or web address] Remember, [employer] is NOT to be used for urgent needs. For medical emergencies, dial 911. Now available from your iPhone and Android! Please provide this summary of care documentation to your next provider. Your primary care clinician is listed as Kahlil Obrien. If you have any questions after today's visit, please call [phone].

## 2017-12-09 LAB
ALBUMIN SERPL-MCNC: 4.3 G/DL (ref 3.5–5.5)
ALBUMIN/GLOB SERPL: 1.6 {RATIO} (ref 1.2–2.2)
ALP SERPL-CCNC: 76 IU/L (ref 39–117)
ALT SERPL-CCNC: 23 IU/L (ref 0–32)
AST SERPL-CCNC: 17 IU/L (ref 0–40)
BASOPHILS # BLD AUTO: 0 X10E3/UL (ref 0–0.2)
BASOPHILS NFR BLD AUTO: 0 %
BILIRUB SERPL-MCNC: <0.2 MG/DL (ref 0–1.2)
BUN SERPL-MCNC: 12 MG/DL (ref 6–24)
BUN/CREAT SERPL: 14 (ref 9–23)
CALCIUM SERPL-MCNC: 9 MG/DL (ref 8.7–10.2)
CHLORIDE SERPL-SCNC: 107 MMOL/L (ref 96–106)
CO2 SERPL-SCNC: 20 MMOL/L (ref 18–29)
CREAT SERPL-MCNC: 0.83 MG/DL (ref 0.57–1)
EOSINOPHIL # BLD AUTO: 0.1 X10E3/UL (ref 0–0.4)
EOSINOPHIL NFR BLD AUTO: 2 %
ERYTHROCYTE [DISTWIDTH] IN BLOOD BY AUTOMATED COUNT: 12.9 % (ref 12.3–15.4)
FERRITIN SERPL-MCNC: 80 NG/ML (ref 15–150)
GFR SERPLBLD CREATININE-BSD FMLA CKD-EPI: 85 ML/MIN/1.73
GFR SERPLBLD CREATININE-BSD FMLA CKD-EPI: 98 ML/MIN/1.73
GLOBULIN SER CALC-MCNC: 2.7 G/DL (ref 1.5–4.5)
GLUCOSE SERPL-MCNC: 88 MG/DL (ref 65–99)
HCT VFR BLD AUTO: 44 % (ref 34–46.6)
HGB BLD-MCNC: 14.1 G/DL (ref 11.1–15.9)
IMM GRANULOCYTES # BLD: 0 X10E3/UL (ref 0–0.1)
IMM GRANULOCYTES NFR BLD: 0 %
IRON SATN MFR SERPL: 22 % (ref 15–55)
IRON SERPL-MCNC: 65 UG/DL (ref 27–159)
LYMPHOCYTES # BLD AUTO: 2.1 X10E3/UL (ref 0.7–3.1)
LYMPHOCYTES NFR BLD AUTO: 39 %
MCH RBC QN AUTO: 30.1 PG (ref 26.6–33)
MCHC RBC AUTO-ENTMCNC: 32 G/DL (ref 31.5–35.7)
MCV RBC AUTO: 94 FL (ref 79–97)
MONOCYTES # BLD AUTO: 0.4 X10E3/UL (ref 0.1–0.9)
MONOCYTES NFR BLD AUTO: 7 %
NEUTROPHILS # BLD AUTO: 2.9 X10E3/UL (ref 1.4–7)
NEUTROPHILS NFR BLD AUTO: 52 %
PLATELET # BLD AUTO: 298 X10E3/UL (ref 150–379)
POTASSIUM SERPL-SCNC: 4.3 MMOL/L (ref 3.5–5.2)
PROT SERPL-MCNC: 7 G/DL (ref 6–8.5)
RBC # BLD AUTO: 4.69 X10E6/UL (ref 3.77–5.28)
SODIUM SERPL-SCNC: 141 MMOL/L (ref 134–144)
TIBC SERPL-MCNC: 302 UG/DL (ref 250–450)
TOPIRAMATE SERPL-MCNC: 11.1 UG/ML (ref 2–25)
TSH SERPL DL<=0.005 MIU/L-ACNC: 2.39 UIU/ML (ref 0.45–4.5)
UIBC SERPL-MCNC: 237 UG/DL (ref 131–425)
VIT B12 SERPL-MCNC: 628 PG/ML (ref 232–1245)
WBC # BLD AUTO: 5.5 X10E3/UL (ref 3.4–10.8)

## 2018-01-15 ENCOUNTER — OFFICE VISIT (OUTPATIENT)
Dept: INTERNAL MEDICINE CLINIC | Age: 48
End: 2018-01-15

## 2018-01-15 VITALS
HEIGHT: 70 IN | TEMPERATURE: 98.5 F | RESPIRATION RATE: 12 BRPM | SYSTOLIC BLOOD PRESSURE: 108 MMHG | WEIGHT: 254.2 LBS | DIASTOLIC BLOOD PRESSURE: 75 MMHG | BODY MASS INDEX: 36.39 KG/M2 | HEART RATE: 69 BPM | OXYGEN SATURATION: 98 %

## 2018-01-15 DIAGNOSIS — J01.00 ACUTE MAXILLARY SINUSITIS, RECURRENCE NOT SPECIFIED: ICD-10-CM

## 2018-01-15 DIAGNOSIS — J02.9 SORE THROAT: Primary | ICD-10-CM

## 2018-01-15 DIAGNOSIS — J40 BRONCHITIS: ICD-10-CM

## 2018-01-15 DIAGNOSIS — J98.01 BRONCHOSPASM: ICD-10-CM

## 2018-01-15 LAB
S PYO AG THROAT QL: NEGATIVE
VALID INTERNAL CONTROL?: YES

## 2018-01-15 RX ORDER — LEVALBUTEROL INHALATION SOLUTION 1.25 MG/3ML
1.25 SOLUTION RESPIRATORY (INHALATION)
Qty: 3 ML | Refills: 0
Start: 2018-01-15 | End: 2018-01-15

## 2018-01-15 RX ORDER — AMOXICILLIN AND CLAVULANATE POTASSIUM 875; 125 MG/1; MG/1
1 TABLET, FILM COATED ORAL 2 TIMES DAILY
Qty: 20 TAB | Refills: 0 | Status: SHIPPED | OUTPATIENT
Start: 2018-01-15 | End: 2018-04-13

## 2018-01-15 RX ORDER — ALBUTEROL SULFATE 90 UG/1
2 AEROSOL, METERED RESPIRATORY (INHALATION)
Qty: 1 INHALER | Refills: 0 | Status: SHIPPED | OUTPATIENT
Start: 2018-01-15 | End: 2018-04-13

## 2018-01-15 NOTE — PATIENT INSTRUCTIONS
Sinusitis: Care Instructions  Your Care Instructions    Sinusitis is an infection of the lining of the sinus cavities in your head. Sinusitis often follows a cold. It causes pain and pressure in your head and face. In most cases, sinusitis gets better on its own in 1 to 2 weeks. But some mild symptoms may last for several weeks. Sometimes antibiotics are needed. Follow-up care is a key part of your treatment and safety. Be sure to make and go to all appointments, and call your doctor if you are having problems. It's also a good idea to know your test results and keep a list of the medicines you take. How can you care for yourself at home? · Take an over-the-counter pain medicine, such as acetaminophen (Tylenol), ibuprofen (Advil, Motrin), or naproxen (Aleve). Read and follow all instructions on the label. · If the doctor prescribed antibiotics, take them as directed. Do not stop taking them just because you feel better. You need to take the full course of antibiotics. · Be careful when taking over-the-counter cold or flu medicines and Tylenol at the same time. Many of these medicines have acetaminophen, which is Tylenol. Read the labels to make sure that you are not taking more than the recommended dose. Too much acetaminophen (Tylenol) can be harmful. · Breathe warm, moist air from a steamy shower, a hot bath, or a sink filled with hot water. Avoid cold, dry air. Using a humidifier in your home may help. Follow the directions for cleaning the machine. · Use saline (saltwater) nasal washes to help keep your nasal passages open and wash out mucus and bacteria. You can buy saline nose drops at a grocery store or drugstore. Or you can make your own at home by adding 1 teaspoon of salt and 1 teaspoon of baking soda to 2 cups of distilled water. If you make your own, fill a bulb syringe with the solution, insert the tip into your nostril, and squeeze gently. Jodell Chi your nose.   · Put a hot, wet towel or a warm gel pack on your face 3 or 4 times a day for 5 to 10 minutes each time. · Try a decongestant nasal spray like oxymetazoline (Afrin). Do not use it for more than 3 days in a row. Using it for more than 3 days can make your congestion worse. When should you call for help? Call your doctor now or seek immediate medical care if:  ? · You have new or worse swelling or redness in your face or around your eyes. ? · You have a new or higher fever. ? Watch closely for changes in your health, and be sure to contact your doctor if:  ? · You have new or worse facial pain. ? · The mucus from your nose becomes thicker (like pus) or has new blood in it. ? · You are not getting better as expected. Where can you learn more? Go to http://shashank-elías.info/. Enter P036 in the search box to learn more about \"Sinusitis: Care Instructions. \"  Current as of: May 12, 2017  Content Version: 11.4  © 2690-3893 [employer]. Care instructions adapted under license by [employer] (which disclaims liability or warranty for this information). If you have questions about a medical condition or this instruction, always ask your healthcare professional. [place] [address] any warranty or liability for your use of this information. Bronchitis: Care Instructions  Your Care Instructions    Bronchitis is inflammation of the bronchial tubes, which carry air to the lungs. The tubes swell and produce mucus, or phlegm. The mucus and inflamed bronchial tubes make you cough. You may have trouble breathing. Most cases of bronchitis are caused by viruses like those that cause colds. Antibiotics usually do not help and they may be harmful. Bronchitis usually develops rapidly and lasts about 2 to 3 weeks in otherwise healthy people. Follow-up care is a key part of your treatment and safety. Be sure to make and go to all appointments, and call your doctor if you are having problems. It's also a good idea to know your test results and keep a list of the medicines you take. How can you care for yourself at home? · Take all medicines exactly as prescribed. Call your doctor if you think you are having a problem with your medicine. · Get some extra rest.  · Take an over-the-counter pain medicine, such as acetaminophen (Tylenol), ibuprofen (Advil, Motrin), or naproxen (Aleve) to reduce fever and relieve body aches. Read and follow all instructions on the label. · Do not take two or more pain medicines at the same time unless the doctor told you to. Many pain medicines have acetaminophen, which is Tylenol. Too much acetaminophen (Tylenol) can be harmful. · Take an over-the-counter cough medicine that contains dextromethorphan to help quiet a dry, hacking cough so that you can sleep. Avoid cough medicines that have more than one active ingredient. Read and follow all instructions on the label. · Breathe moist air from a humidifier, hot shower, or sink filled with hot water. The heat and moisture will thin mucus so you can cough it out. · Do not smoke. Smoking can make bronchitis worse. If you need help quitting, talk to your doctor about stop-smoking programs and medicines. These can increase your chances of quitting for good. When should you call for help? Call 911 anytime you think you may need emergency care. For example, call if:  ? · You have severe trouble breathing. ?Call your doctor now or seek immediate medical care if:  ? · You have new or worse trouble breathing. ? · You cough up dark brown or bloody mucus (sputum). ? · You have a new or higher fever. ? · You have a new rash. ? Watch closely for changes in your health, and be sure to contact your doctor if:  ? · You cough more deeply or more often, especially if you notice more mucus or a change in the color of your mucus. ? · You are not getting better as expected. Where can you learn more?   Go to http://shashank-elías.info/. Enter H333 in the search box to learn more about \"Bronchitis: Care Instructions. \"  Current as of: May 12, 2017  Content Version: 11.4  © 9074-0703 [employer]. Care instructions adapted under license by [employer] (which disclaims liability or warranty for this information). If you have questions about a medical condition or this instruction, always ask your healthcare professional. [place] [address] any warranty or liability for your use of this information. Reactive Airway Disease: Care Instructions  Your Care Instructions    Reactive airway disease is a breathing problem that appears as wheezing, a whistling noise in your airways. It may be caused by a viral or bacterial infection, allergies, tobacco smoke, or something else in the environment. When you are around these triggers, your body releases chemicals that make the airways get tight. Reactive airway disease is a lot like asthma. Both can cause wheezing. But asthma is ongoing, while reactive airway disease may occur only now and then. Tests can be done to tell whether you have asthma. You may take the same medicines used to treat asthma. Good home care and follow-up care with your doctor can help you recover. Follow-up care is a key part of your treatment and safety. Be sure to make and go to all appointments, and call your doctor if you are having problems. It's also a good idea to know your test results and keep a list of the medicines you take. How can you care for yourself at home? · Take your medicines exactly as prescribed. Call your doctor if you think you are having a problem with your medicine. · Do not smoke or allow others to smoke around you. If you need help quitting, talk to your doctor about stop-smoking programs and medicines. These can increase your chances of quitting for good.   · If you know what caused your wheezing (such as perfume or the odor of household chemicals), try to avoid it in the future. · Wash your hands several times a day, and consider using hand gels or wipes that contain alcohol. This can prevent colds and other infections. When should you call for help? Call 911 anytime you think you may need emergency care. For example, call if:  ? · You have severe trouble breathing. ? Watch closely for changes in your health, and be sure to contact your doctor if:  ? · You cough up yellow, dark brown, or bloody mucus. ? · You have a fever. ? · Your wheezing gets worse. Where can you learn more? Go to http://shashank-elías.info/. Enter C421 in the search box to learn more about \"Reactive Airway Disease: Care Instructions. \"  Current as of: May 12, 2017  Content Version: 11.4  © 1304-9480 [employer]. Care instructions adapted under license by [employer] (which disclaims liability or warranty for this information). If you have questions about a medical condition or this instruction, always ask your healthcare professional. [address] any warranty or liability for your use of this information.

## 2018-01-15 NOTE — MR AVS SNAPSHOT
[facility] 
 
 
 [address] [place] [address] [facility] 
[phone] Patient: Gracy Deras MRN: [id number] Visit Information Date & Time Provider Department Dept. Phone Encounter #  
 1/15/2018 11:40 AM Julien Hermosillo NP [facility] [address] [facility] [id number] Your Appointments 5/8/2018  8:40 AM  
Follow Up with Jaden Michaud MD  
[facility]) Appt Note: follow up headache  $0CP  zion  11/7/17 [place] [address] [facility] [id number] [phone]  
  
   
 [place] [address] [id number] [address] Upcoming Health Maintenance Date Due  
 PAP AKA CERVICAL CYTOLOGY 5/1/1992 DTaP/Tdap/Td series (2 - Td) 9/5/2022 Allergies as of 1/15/2018  Review Complete On: 1/15/2018 By: Julien Hermosillo NP Severity Noted Reaction Type Reactions Topamax [Topiramate]  12/06/2010    Other (comments) Low grade fever Only with brand name ok with generic Current Immunizations  Reviewed on 12/7/2017 Name Date Influenza Vaccine (Quad) PF 12/7/2017 TDAP Vaccine 9/5/2012 Not reviewed this visit You Were Diagnosed With   
  
 Codes Comments Sore throat    -  Primary ICD-10-CM: J02.9 ICD-9-CM: 536 Acute maxillary sinusitis, recurrence not specified     ICD-10-CM: J01.00 ICD-9-CM: 461.0 Bronchitis     ICD-10-CM: J40 ICD-9-CM: 082 Vitals BP Pulse Temp Resp Height(growth percentile) Weight(growth percentile) 108/75 (BP 1 Location: Left arm, BP Patient Position: Sitting) 69 98.5 °F (36.9 °C) (Oral) 12 5' 10\" (1.778 m) 254 lb 3.2 oz (115.3 kg) SpO2 BMI OB Status Smoking Status 98% 36.47 kg/m2 IUD Never Smoker Vitals History BMI and BSA Data Body Mass Index Body Surface Area  
 36.47 kg/m 2 2.39 m 2 Preferred Pharmacy Pharmacy Name Phone [id number]- [place], [address] AT Count includes the [facility] [phone] Your Updated Medication List  
  
   
This list is accurate as of: 1/15/18 12:19 PM.  Always use your most recent med list.  
  
  
  
  
 albuterol 90 mcg/actuation inhaler Commonly known as:  PROVENTIL HFA, VENTOLIN HFA, PROAIR HFA Take 2 Puffs by inhalation every six (6) hours as needed for Wheezing. amoxicillin-clavulanate 875-125 mg per tablet Commonly known as:  AUGMENTIN Take 1 Tab by mouth two (2) times a day. guaiFENesin-dextromethorphan -30 mg per tablet Commonly known as:  Timur & Timur DM Take 1 Tab by mouth two (2) times a day. Iron 325 mg (65 mg iron) tablet Generic drug:  ferrous sulfate Take  by mouth Daily (before breakfast). LORTAB 5-500 mg per tablet Generic drug:  HYDROcodone-acetaminophen Take  by mouth as needed. MIRENA 20 mcg/24 hr (5 years) Iud  
Generic drug:  levonorgestrel 1 Each by IntraUTERine route once. multivitamin tablet Commonly known as:  ONE A DAY Take 1 tablet by mouth daily. RELPAX 40 mg tablet Generic drug:  eletriptan TAKE 1 TABLET BY MOUTH ONCE AS NEEDED. MAY REPEAT IN 2 HOURS IF NEEDED  
  
 SPRIX 15.75 mg/spray Spry Generic drug:  ketorolac  
  
 topiramate 200 mg tablet Commonly known as:  TOPAMAX TAKE 1 TABLET EVERY MORNINGAND TAKE 1 AND 1/2 TABLETS IN THE EVENING Prescriptions Sent to Pharmacy Refills  
 amoxicillin-clavulanate (AUGMENTIN) 875-125 mg per tablet 0 Sig: Take 1 Tab by mouth two (2) times a day. Class: Normal  
 Pharmacy: [address] [place] [address] Ph #: [phone] Route: Oral  
 albuterol (PROVENTIL HFA, VENTOLIN HFA, PROAIR HFA) 90 mcg/actuation inhaler 0 Sig: Take 2 Puffs by inhalation every six (6) hours as needed for Wheezing.   
 Class: Normal  
 [facility] #: [phone] Route: Inhalation We Performed the Following AMB POC RAPID STREP A [95880 CPT(R)] Patient Instructions Sinusitis: Care Instructions Your Care Instructions Sinusitis is an infection of the lining of the sinus cavities in your head. Sinusitis often follows a cold. It causes pain and pressure in your head and face. In most cases, sinusitis gets better on its own in 1 to 2 weeks. But some mild symptoms may last for several weeks. Sometimes antibiotics are needed. Follow-up care is a key part of your treatment and safety. Be sure to make and go to all appointments, and call your doctor if you are having problems. It's also a good idea to know your test results and keep a list of the medicines you take. How can you care for yourself at home? · Take an over-the-counter pain medicine, such as acetaminophen (Tylenol), ibuprofen (Advil, Motrin), or naproxen (Aleve). Read and follow all instructions on the label. · If the doctor prescribed antibiotics, take them as directed. Do not stop taking them just because you feel better. You need to take the full course of antibiotics. · Be careful when taking over-the-counter cold or flu medicines and Tylenol at the same time. Many of these medicines have acetaminophen, which is Tylenol. Read the labels to make sure that you are not taking more than the recommended dose. Too much acetaminophen (Tylenol) can be harmful. · Breathe warm, moist air from a steamy shower, a hot bath, or a sink filled with hot water. Avoid cold, dry air. Using a humidifier in your home may help. Follow the directions for cleaning the machine. · Use saline (saltwater) nasal washes to help keep your nasal passages open and wash out mucus and bacteria. You can buy saline nose drops at a grocery store or drugstore.  Or you can make your own at home by adding 1 teaspoon of salt and 1 teaspoon of baking soda to 2 cups of distilled water. If you make your own, fill a bulb syringe with the solution, insert the tip into your nostril, and squeeze gently. Arty Sine your nose. · Put a hot, wet towel or a warm gel pack on your face 3 or 4 times a day for 5 to 10 minutes each time. · Try a decongestant nasal spray like oxymetazoline (Afrin). Do not use it for more than 3 days in a row. Using it for more than 3 days can make your congestion worse. When should you call for help? Call your doctor now or seek immediate medical care if: 
? · You have new or worse swelling or redness in your face or around your eyes. ? · You have a new or higher fever. ? Watch closely for changes in your health, and be sure to contact your doctor if: 
? · You have new or worse facial pain. ? · The mucus from your nose becomes thicker (like pus) or has new blood in it. ? · You are not getting better as expected. Where can you learn more? Go to http://shashank-elías.info/. Enter Z905 in the search box to learn more about \"Sinusitis: Care Instructions. \" Current as of: May 12, 2017 Content Version: 11.4 © 8382-4405 [employer]. Care instructions adapted under license by [employer] (which disclaims liability or warranty for this information). If you have questions about a medical condition or this instruction, always ask your healthcare professional. [place] [address] any warranty or liability for your use of this information. Bronchitis: Care Instructions Your Care Instructions Bronchitis is inflammation of the bronchial tubes, which carry air to the lungs. The tubes swell and produce mucus, or phlegm. The mucus and inflamed bronchial tubes make you cough. You may have trouble breathing. Most cases of bronchitis are caused by viruses like those that cause colds. Antibiotics usually do not help and they may be harmful. Bronchitis usually develops rapidly and lasts about 2 to 3 weeks in otherwise healthy people. Follow-up care is a key part of your treatment and safety. Be sure to make and go to all appointments, and call your doctor if you are having problems. It's also a good idea to know your test results and keep a list of the medicines you take. How can you care for yourself at home? · Take all medicines exactly as prescribed. Call your doctor if you think you are having a problem with your medicine. · Get some extra rest. 
· Take an over-the-counter pain medicine, such as acetaminophen (Tylenol), ibuprofen (Advil, Motrin), or naproxen (Aleve) to reduce fever and relieve body aches. Read and follow all instructions on the label. · Do not take two or more pain medicines at the same time unless the doctor told you to. Many pain medicines have acetaminophen, which is Tylenol. Too much acetaminophen (Tylenol) can be harmful. · Take an over-the-counter cough medicine that contains dextromethorphan to help quiet a dry, hacking cough so that you can sleep. Avoid cough medicines that have more than one active ingredient. Read and follow all instructions on the label. · Breathe moist air from a humidifier, hot shower, or sink filled with hot water. The heat and moisture will thin mucus so you can cough it out. · Do not smoke. Smoking can make bronchitis worse. If you need help quitting, talk to your doctor about stop-smoking programs and medicines. These can increase your chances of quitting for good. When should you call for help? Call 911 anytime you think you may need emergency care. For example, call if: 
? · You have severe trouble breathing. ?Call your doctor now or seek immediate medical care if: 
? · You have new or worse trouble breathing. ? · You cough up dark brown or bloody mucus (sputum). ? · You have a new or higher fever. ? · You have a new rash. ?Watch closely for changes in your health, and be sure to contact your doctor if: 
? · You cough more deeply or more often, especially if you notice more mucus or a change in the color of your mucus. ? · You are not getting better as expected. Where can you learn more? Go to http://shashank-elías.info/. Enter H333 in the search box to learn more about \"Bronchitis: Care Instructions. \" Current as of: May 12, 2017 Content Version: 11.4 © 8755-8407 [employer]. Care instructions adapted under license by [employer] (which disclaims liability or warranty for this information). If you have questions about a medical condition or this instruction, always ask your healthcare professional. [address] any warranty or liability for your use of this information. Reactive Airway Disease: Care Instructions Your Care Instructions Reactive airway disease is a breathing problem that appears as wheezing, a whistling noise in your airways. It may be caused by a viral or bacterial infection, allergies, tobacco smoke, or something else in the environment. When you are around these triggers, your body releases chemicals that make the airways get tight. Reactive airway disease is a lot like asthma. Both can cause wheezing. But asthma is ongoing, while reactive airway disease may occur only now and then. Tests can be done to tell whether you have asthma. You may take the same medicines used to treat asthma. Good home care and follow-up care with your doctor can help you recover. Follow-up care is a key part of your treatment and safety. Be sure to make and go to all appointments, and call your doctor if you are having problems. It's also a good idea to know your test results and keep a list of the medicines you take. How can you care for yourself at home? · Take your medicines exactly as prescribed.  Call your doctor if you think you are having a problem with your medicine. · Do not smoke or allow others to smoke around you. If you need help quitting, talk to your doctor about stop-smoking programs and medicines. These can increase your chances of quitting for good. · If you know what caused your wheezing (such as perfume or the odor of household chemicals), try to avoid it in the future. · Wash your hands several times a day, and consider using hand gels or wipes that contain alcohol. This can prevent colds and other infections. When should you call for help? Call 911 anytime you think you may need emergency care. For example, call if: 
? · You have severe trouble breathing. ? Watch closely for changes in your health, and be sure to contact your doctor if: 
? · You cough up yellow, dark brown, or bloody mucus. ? · You have a fever. ? · Your wheezing gets worse. Where can you learn more? Go to http://shashank-elías.info/. Enter S067 in the search box to learn more about \"Reactive Airway Disease: Care Instructions. \" Current as of: May 12, 2017 Content Version: 11.4 © 2664-5370 [employer]. Care instructions adapted under license by [employer] (which disclaims liability or warranty for this information). If you have questions about a medical condition or this instruction, always ask your healthcare professional. [address] any warranty or liability for your use of this information. Introducing [facility]! Dear Nery Found: 
Thank you for requesting a [employer] account. Our records indicate that you already have an active [employer] account. You can access your account anytime at [email or web address] Did you know that you can access your hospital and ER discharge instructions at any time in [employer]? You can also review all of your test results from your hospital stay or ER visit. Additional Information If you have questions, please visit the Frequently Asked Questions section of the [email or web address] website at [email or web address] Remember, RADLIVE is NOT to be used for urgent needs. For medical emergencies, dial 911. Now available from your iPhone and Android! Please provide this summary of care documentation to your next provider. Your primary care clinician is listed as Maki Mae. If you have any questions after today's visit, please call [phone].

## 2018-01-15 NOTE — PROGRESS NOTES
HISTORY OF PRESENT ILLNESS  Jose Antonio Couch is a 56 y.o. female. HPI   Upper respiratory illness:  Jose Antonio Couch presents with complaints of congestion, sore throat, post nasal drip, cough described as productive of yellow sputum, bilateral, lower sinus pain, low grade fevers and yellow nasal discharge for 3 weeks. nausea and no vomiting . she has not had  myalgias. Symptoms are moderate. Over-the-counter remedies including Robitussin, Sangeetha [place] cold   has been used with poor relief of symptoms. Cough has been harsh and she has been feeling tight in chest especially after coughing spasms. Drinking plenty of fluids: yes  Asthma?:  no  non-smoker  Contacts with similar infections: yes     Review of Systems   Constitutional: Positive for fever and malaise/fatigue. Negative for chills. HENT: Positive for congestion, sinus pain and sore throat. Negative for ear pain. Respiratory: Positive for cough, sputum production, shortness of breath and wheezing. Cardiovascular: Negative for chest pain and palpitations. Gastrointestinal: Positive for nausea. Negative for abdominal pain, constipation, diarrhea and vomiting. Genitourinary: Negative for dysuria and frequency. Musculoskeletal: Negative for myalgias. Neurological: Positive for headaches. Negative for dizziness. /75 (BP 1 Location: Left arm, BP Patient Position: Sitting)  Pulse 69  Temp 98.5 °F (36.9 °C) (Oral)   Resp 12  Ht 5' 10\" (1.778 m)  Wt 254 lb 3.2 oz (115.3 kg)  SpO2 98%  BMI 36.47 kg/m2  Physical Exam   Constitutional: She is oriented to person, place, and time. She appears well-developed and well-nourished. HENT:   Head: Normocephalic and atraumatic. Right Ear: Tympanic membrane and external ear normal.   Left Ear: Tympanic membrane and external ear normal.   Nose: Mucosal edema present. Right sinus exhibits maxillary sinus tenderness. Left sinus exhibits maxillary sinus tenderness.    Mouth/Throat: Posterior oropharyngeal erythema present. No posterior oropharyngeal edema. Neck: Normal range of motion. Neck supple. No thyromegaly present. Cardiovascular: Normal rate, regular rhythm and normal heart sounds. Pulmonary/Chest: Effort normal. She has wheezes. She has no rales. Upper chest congestion   Musculoskeletal: Normal range of motion. Lymphadenopathy:     She has no cervical adenopathy. Neurological: She is alert and oriented to person, place, and time. Skin: Skin is warm and dry. Psychiatric: She has a normal mood and affect. Her behavior is normal.   Nursing note and vitals reviewed. ASSESSMENT and PLAN  Diagnoses and all orders for this visit:    1. Sore throat  -     AMB POC RAPID STREP A -- negative. 2. Acute maxillary sinusitis, recurrence not specified  -     amoxicillin-clavulanate (AUGMENTIN) 875-125 mg per tablet; Take 1 Tab by mouth two (2) times a day. -     guaiFENesin-dextromethorphan SR (MUCINEX DM) 600-30 mg per tablet; Take 1 Tab by mouth two (2) times a day. 3. Bronchitis  -     amoxicillin-clavulanate (AUGMENTIN) 875-125 mg per tablet; Take 1 Tab by mouth two (2) times a day. -     guaiFENesin-dextromethorphan SR (MUCINEX DM) 600-30 mg per tablet; Take 1 Tab by mouth two (2) times a day. 4. Bronchospasm -- Xopenex nebulizer treatment given in office with improvement of air exchange. -     albuterol (PROVENTIL HFA, VENTOLIN HFA, PROAIR HFA) 90 mcg/actuation inhaler; Take 2 Puffs by inhalation every six (6) hours as needed for Wheezing.  -     LEVALBUTEROL, INHAL. SOL., FDA-APPROVED FINAL, NON-COMPOUND UNIT DOSE, 0.5 MG  -     levalbuterol (XOPENEX) 1.25 mg/3 mL nebu; 3 mL by Nebulization route now for 1 dose. -     CA PRESSURIZED/NONPRESSURIZED INHALATION TREATMENT    Follow up if signs and symptoms worsen or change. After hours number given.    lab results and schedule of future lab studies reviewed with patient  reviewed diet, exercise and weight control  reviewed medications and side effects in detail  This note will not be viewable in MyChart.

## 2018-02-01 ENCOUNTER — APPOINTMENT (OUTPATIENT)
Dept: GENERAL RADIOLOGY | Age: 48
End: 2018-02-01
Attending: EMERGENCY MEDICINE
Payer: COMMERCIAL

## 2018-02-01 ENCOUNTER — HOSPITAL ENCOUNTER (EMERGENCY)
Age: 48
Discharge: HOME | End: 2018-02-01
Attending: EMERGENCY MEDICINE | Admitting: EMERGENCY MEDICINE
Payer: COMMERCIAL

## 2018-02-01 VITALS
HEART RATE: 65 BPM | OXYGEN SATURATION: 96 % | WEIGHT: 250 LBS | HEIGHT: 70 IN | RESPIRATION RATE: 15 BRPM | BODY MASS INDEX: 35.79 KG/M2 | DIASTOLIC BLOOD PRESSURE: 84 MMHG | SYSTOLIC BLOOD PRESSURE: 111 MMHG | TEMPERATURE: 98.6 F

## 2018-02-01 DIAGNOSIS — R07.89 ATYPICAL CHEST PAIN: Primary | ICD-10-CM

## 2018-02-01 LAB
ALBUMIN SERPL-MCNC: 3.8 G/DL (ref 3.5–5)
ALBUMIN/GLOB SERPL: 1 {RATIO} (ref 1.1–2.2)
ALP SERPL-CCNC: 75 U/L (ref 45–117)
ALT SERPL-CCNC: 30 U/L (ref 12–78)
ANION GAP SERPL CALC-SCNC: 12 MMOL/L (ref 5–15)
AST SERPL-CCNC: 13 U/L (ref 15–37)
BASOPHILS # BLD: 0 K/UL (ref 0–0.1)
BASOPHILS NFR BLD: 0 % (ref 0–1)
BILIRUB SERPL-MCNC: 0.2 MG/DL (ref 0.2–1)
BUN SERPL-MCNC: 11 MG/DL (ref 6–20)
BUN/CREAT SERPL: 13 (ref 12–20)
CALCIUM SERPL-MCNC: 8.8 MG/DL (ref 8.5–10.1)
CHLORIDE SERPL-SCNC: 107 MMOL/L (ref 97–108)
CK SERPL-CCNC: 57 U/L (ref 26–192)
CO2 SERPL-SCNC: 21 MMOL/L (ref 21–32)
CREAT SERPL-MCNC: 0.83 MG/DL (ref 0.55–1.02)
DIFFERENTIAL METHOD BLD: NORMAL
EOSINOPHIL # BLD: 0 K/UL (ref 0–0.4)
EOSINOPHIL NFR BLD: 1 % (ref 0–7)
ERYTHROCYTE [DISTWIDTH] IN BLOOD BY AUTOMATED COUNT: 11.7 % (ref 11.5–14.5)
GLOBULIN SER CALC-MCNC: 3.7 G/DL (ref 2–4)
GLUCOSE SERPL-MCNC: 123 MG/DL (ref 65–100)
HCT VFR BLD AUTO: 43 % (ref 35–47)
HGB BLD-MCNC: 14.4 G/DL (ref 11.5–16)
IMM GRANULOCYTES # BLD: 0 K/UL (ref 0–0.04)
IMM GRANULOCYTES NFR BLD AUTO: 0 % (ref 0–0.5)
LYMPHOCYTES # BLD: 1.8 K/UL (ref 0.8–3.5)
LYMPHOCYTES NFR BLD: 38 % (ref 12–49)
MCH RBC QN AUTO: 30.4 PG (ref 26–34)
MCHC RBC AUTO-ENTMCNC: 33.5 G/DL (ref 30–36.5)
MCV RBC AUTO: 90.7 FL (ref 80–99)
MONOCYTES # BLD: 0.3 K/UL (ref 0–1)
MONOCYTES NFR BLD: 7 % (ref 5–13)
NEUTS SEG # BLD: 2.7 K/UL (ref 1.8–8)
NEUTS SEG NFR BLD: 54 % (ref 32–75)
NRBC # BLD: 0 K/UL (ref 0–0.01)
NRBC BLD-RTO: 0 PER 100 WBC
PLATELET # BLD AUTO: 259 K/UL (ref 150–400)
PMV BLD AUTO: 11.4 FL (ref 8.9–12.9)
POTASSIUM SERPL-SCNC: 3.5 MMOL/L (ref 3.5–5.1)
PROT SERPL-MCNC: 7.5 G/DL (ref 6.4–8.2)
RBC # BLD AUTO: 4.74 M/UL (ref 3.8–5.2)
RBC MORPH BLD: NORMAL
SODIUM SERPL-SCNC: 140 MMOL/L (ref 136–145)
TROPONIN I SERPL-MCNC: <0.04 NG/ML
WBC # BLD AUTO: 4.8 K/UL (ref 3.6–11)

## 2018-02-01 PROCEDURE — 85025 COMPLETE CBC W/AUTO DIFF WBC: CPT | Performed by: EMERGENCY MEDICINE

## 2018-02-01 PROCEDURE — 36415 COLL VENOUS BLD VENIPUNCTURE: CPT | Performed by: EMERGENCY MEDICINE

## 2018-02-01 PROCEDURE — 82550 ASSAY OF CK (CPK): CPT | Performed by: EMERGENCY MEDICINE

## 2018-02-01 PROCEDURE — 99284 EMERGENCY DEPT VISIT MOD MDM: CPT

## 2018-02-01 PROCEDURE — 80053 COMPREHEN METABOLIC PANEL: CPT | Performed by: EMERGENCY MEDICINE

## 2018-02-01 PROCEDURE — 84484 ASSAY OF TROPONIN QUANT: CPT | Performed by: EMERGENCY MEDICINE

## 2018-02-01 PROCEDURE — 71046 X-RAY EXAM CHEST 2 VIEWS: CPT

## 2018-02-01 PROCEDURE — 93005 ELECTROCARDIOGRAM TRACING: CPT

## 2018-02-01 NOTE — ED TRIAGE NOTES
Pt reports left sided chest pressure since this AM.  States she recently had a sinus infection and finished her abx on Friday. Also has a HA that started this AM.  Hx of migraines. Denies SOB or difficulty breathing. No N/V/D.

## 2018-02-01 NOTE — ED PROVIDER NOTES
HPI Comments: 56 y.o. female with past medical history significant for headache, chronic pain, black stool, back pain and anemia who presents from home with chief complaint of chest pain. Per pt, she has been experiencing intermittent chest pressure throughout the day today. Pt reports that the pain appears briefly for two seconds and does not appear to radiate. She notes her current pain is 2/10. Prior to onset of his chest pain, the pt makes it known that she has been experiencing ongoing sinus like symptoms since late December. Per pt, she finished her abx to treat the sinus infection last Friday (2018). While in the Ed, the pt notes that her pain does not appear pleuritic. In addition to her chest pain, the pt reports experiencing a moderate headache today, yet she has hx of similar headaches in the past. Per pt, she has no previous cardiac hx and no known family hx of cardiac disease. She makes it known that she currently has a Mirena IUD in place, which has been present for the past 1x year. The pt denies fever, chills, N/V/D, SOB, abd pain, back pain, dizziness and urinary symptoms. There are no other acute medical concerns at this time. Social hx: Non-smoker, Current ETOH consumption     PCP: Bella Shaikh MD    Note written by Murali Linares, as dictated by Errol Yin MD 5:54 PM          The history is provided by the patient. No  was used.         Past Medical History:   Diagnosis Date    Anemia     Back pain     Chronic pain     Headache     Headache(784.0)     Stool color black        Past Surgical History:   Procedure Laterality Date    HX  SECTION      HX LUMBAR DISKECTOMY      L 5 S1    HX TUBAL LIGATION           Family History:   Problem Relation Age of Onset    Suicide Father     Depression Father     Cancer Father      lung,    Depression Mother     Depression Sister     Hypertension Maternal Grandfather     Diabetes Maternal Grandfather     Cancer Paternal Grandmother      lung       Social History     Social History    Marital status:      Spouse name: N/A    Number of children: N/A    Years of education: N/A     Occupational History    Not on file. Social History Main Topics    Smoking status: Never Smoker    Smokeless tobacco: Never Used    Alcohol use 1.5 oz/week     3 Glasses of wine per week      Comment: rare    Drug use: No    Sexual activity: Yes     Partners: Male     Birth control/ protection: Surgical     Other Topics Concern    Not on file     Social History Narrative         ALLERGIES: Topamax [topiramate]    Review of Systems   Constitutional: Negative for appetite change, chills and fever. HENT: Negative for rhinorrhea, sore throat and trouble swallowing. Eyes: Negative for photophobia. Respiratory: Negative for cough and shortness of breath. Cardiovascular: Positive for chest pain. Negative for palpitations. Gastrointestinal: Negative for abdominal pain, nausea and vomiting. Genitourinary: Negative for dysuria, frequency and hematuria. Musculoskeletal: Negative for arthralgias. Neurological: Negative for dizziness, syncope and weakness. Psychiatric/Behavioral: Negative for behavioral problems. The patient is not nervous/anxious. All other systems reviewed and are negative. Vitals:    02/01/18 1728   BP: 131/87   Resp: 18   Temp: 98.6 °F (37 °C)   SpO2: 100%   Weight: 113.4 kg (250 lb)   Height: 5' 10\" (1.778 m)            Physical Exam   Constitutional: She is oriented to person, place, and time. She appears well-developed and well-nourished. No distress. HENT:   Head: Normocephalic and atraumatic. Mouth/Throat: Oropharynx is clear and moist.   Eyes: EOM are normal. Pupils are equal, round, and reactive to light. Neck: Normal range of motion. Neck supple. Cardiovascular: Normal rate, regular rhythm, normal heart sounds and intact distal pulses.   Exam reveals no gallop and no friction rub. No murmur heard. Pulmonary/Chest: Effort normal. No respiratory distress. She has no wheezes. She has no rales. She exhibits tenderness (Mild over precordium ). Abdominal: Soft. There is no tenderness. There is no rebound. Musculoskeletal: Normal range of motion. She exhibits no edema or tenderness. Neurological: She is alert and oriented to person, place, and time. No cranial nerve deficit. Motor; symmetric   Skin: No erythema. Psychiatric: She has a normal mood and affect. Her behavior is normal.   Nursing note and vitals reviewed.      Note written by Murali Hogan, as dictated by Marissa Hall MD 5:54 PM    MDM  Number of Diagnoses or Management Options  Atypical chest pain:      Amount and/or Complexity of Data Reviewed  Clinical lab tests: ordered and reviewed  Tests in the radiology section of CPT®: ordered and reviewed  Tests in the medicine section of CPT®: ordered and reviewed  Discussion of test results with the performing providers: yes  Obtain history from someone other than the patient: yes          ED Course       Procedures    ED EKG interpretation:  Rhythm: normal sinus rhythm; Rate (approx.): 67 bpm; Axis: normal; ST/T wave: normal    Note written by Murali Hogan, as dictated by Marissa Hall MD 5:21 PM

## 2018-02-02 LAB
ATRIAL RATE: 67 BPM
CALCULATED P AXIS, ECG09: 30 DEGREES
CALCULATED R AXIS, ECG10: 6 DEGREES
CALCULATED T AXIS, ECG11: 47 DEGREES
DIAGNOSIS, 93000: NORMAL
P-R INTERVAL, ECG05: 166 MS
Q-T INTERVAL, ECG07: 384 MS
QRS DURATION, ECG06: 90 MS
QTC CALCULATION (BEZET), ECG08: 405 MS
VENTRICULAR RATE, ECG03: 67 BPM

## 2018-02-02 NOTE — ED NOTES
Bedside and Verbal shift change report given to Mac Person RN (oncoming nurse) by Rolando Valentin RN (offgoing nurse). Report included the following information SBAR, Kardex, ED Summary, [facility] and Recent Results.

## 2018-02-02 NOTE — DISCHARGE INSTRUCTIONS
Chest Pain: Care Instructions  Your Care Instructions    There are many things that can cause chest pain. Some are not serious and will get better on their own in a few days. But some kinds of chest pain need more testing and treatment. Your doctor may have recommended a follow-up visit in the next 8 to 12 hours. If you are not getting better, you may need more tests or treatment. Even though your doctor has released you, you still need to watch for any problems. The doctor carefully checked you, but sometimes problems can develop later. If you have new symptoms or if your symptoms do not get better, get medical care right away. If you have worse or different chest pain or pressure that lasts more than 5 minutes or you passed out (lost consciousness), call 911 or seek other emergency help right away. A medical visit is only one step in your treatment. Even if you feel better, you still need to do what your doctor recommends, such as going to all suggested follow-up appointments and taking medicines exactly as directed. This will help you recover and help prevent future problems. How can you care for yourself at home? · Rest until you feel better. · Take your medicine exactly as prescribed. Call your doctor if you think you are having a problem with your medicine. · Do not drive after taking a prescription pain medicine. When should you call for help? Call 911 if:  ? · You passed out (lost consciousness). ? · You have severe difficulty breathing. ? · You have symptoms of a heart attack. These may include:  ¨ Chest pain or pressure, or a strange feeling in your chest.  ¨ Sweating. ¨ Shortness of breath. ¨ Nausea or vomiting. ¨ Pain, pressure, or a strange feeling in your back, neck, jaw, or upper belly or in one or both shoulders or arms. ¨ Lightheadedness or sudden weakness. ¨ A fast or irregular heartbeat.   After you call 911, the  may tell you to chew 1 adult-strength or 2 to 4 low-dose aspirin. Wait for an ambulance. Do not try to drive yourself. ?Call your doctor today if:  ? · You have any trouble breathing. ? · Your chest pain gets worse. ? · You are dizzy or lightheaded, or you feel like you may faint. ? · You are not getting better as expected. ? · You are having new or different chest pain. Where can you learn more? Go to http://shashank-elías.info/. Enter A120 in the search box to learn more about \"Chest Pain: Care Instructions. \"  Current as of: March 20, 2017  Content Version: 11.4  © 3182-8690 [employer]. Care instructions adapted under license by [employer] (which disclaims liability or warranty for this information). If you have questions about a medical condition or this instruction, always ask your healthcare professional. [place] [address] any warranty or liability for your use of this information. Musculoskeletal Chest Pain: Care Instructions  Your Care Instructions    Chest pain is not always a sign that something is wrong with your heart or that you have another serious problem. The doctor thinks your chest pain is caused by strained muscles or ligaments, inflamed chest cartilage, or another problem in your chest, rather than by your heart. You may need more tests to find the cause of your chest pain. Follow-up care is a key part of your treatment and safety. Be sure to make and go to all appointments, and call your doctor if you are having problems. It's also a good idea to know your test results and keep a list of the medicines you take. How can you care for yourself at home? · Take pain medicines exactly as directed. ¨ If the doctor gave you a prescription medicine for pain, take it as prescribed. ¨ If you are not taking a prescription pain medicine, ask your doctor if you can take an over-the-counter medicine. · Rest and protect the sore area.   · Stop, change, or take a break from any activity that may be causing your pain or soreness. · Put ice or a cold pack on the sore area for 10 to 20 minutes at a time. Try to do this every 1 to 2 hours for the next 3 days (when you are awake) or until the swelling goes down. Put a thin cloth between the ice and your skin. · After 2 or 3 days, apply a heating pad set on low or a warm cloth to the area that hurts. Some doctors suggest that you go back and forth between hot and cold. · Do not wrap or tape your ribs for support. This may cause you to take smaller breaths, which could increase your risk of lung problems. · Mentholated creams such as Bengay or Icy Hot may soothe sore muscles. Follow the instructions on the package. · Follow your doctor's instructions for exercising. · Gentle stretching and massage may help you get better faster. Stretch slowly to the point just before pain begins, and hold the stretch for at least 15 to 30 seconds. Do this 3 or 4 times a day. Stretch just after you have applied heat. · As your pain gets better, slowly return to your normal activities. Any increased pain may be a sign that you need to rest a while longer. When should you call for help? Call 911 anytime you think you may need emergency care. For example, call if:  ? · You have chest pain or pressure. This may occur with:  ¨ Sweating. ¨ Shortness of breath. ¨ Nausea or vomiting. ¨ Pain that spreads from the chest to the neck, jaw, or one or both shoulders or arms. ¨ Dizziness or lightheadedness. ¨ A fast or uneven pulse. After calling 911, chew 1 adult-strength aspirin. Wait for an ambulance. Do not try to drive yourself. ? · You have sudden chest pain and shortness of breath, or you cough up blood. ?Call your doctor now or seek immediate medical care if:  ? · You have any trouble breathing. ? · Your chest pain gets worse. ? · Your chest pain occurs consistently with exercise and is relieved by rest.   ? Watch closely for changes in your health, and be sure to contact your doctor if:  ? · Your chest pain does not get better after 1 week. Where can you learn more? Go to http://shashank-elías.info/. Enter V293 in the search box to learn more about \"Musculoskeletal Chest Pain: Care Instructions. \"  Current as of: March 20, 2017  Content Version: 11.4  © 1752-4972 [employer]. Care instructions adapted under license by [employer] (which disclaims liability or warranty for this information). If you have questions about a medical condition or this instruction, always ask your healthcare professional. [place] [address] any warranty or liability for your use of this information. We hope that we have addressed all of your medical concerns. The examination and treatment you received in the Emergency Department were for an emergent problem and were not intended as complete care. It is important that you follow up with your healthcare provider(s) for ongoing care. If your symptoms worsen or do not improve as expected, and you are unable to reach your usual health care provider(s), you should return to the Emergency Department. Today's healthcare is undergoing tremendous change, and patient satisfaction surveys are one of the many tools to assess the quality of medical care. You may receive a survey from the [employer] regarding your experience in the Emergency Department. I hope that your experience has been completely positive, particularly the medical care that I provided. As such, please participate in the survey; anything less than excellent does not meet my expectations or intentions. [facility] and [address] [place] [address] participate in nationally recognized quality of care measures.   If your blood pressure is greater than 120/80, as reported below, we urge that you seek medical care to address the potential of high blood pressure, commonly known as hypertension. Hypertension can be hereditary or can be caused by certain medical conditions, pain, stress, or \"white coat syndrome. \"       Please make an appointment with your health care provider(s) for follow up of your Emergency Department visit. VITALS:   Patient Vitals for the past 8 hrs:   Temp Resp BP SpO2   02/01/18 1843 - - - 98 %   02/01/18 1728 98.6 °F (37 °C) 18 131/87 100 %          Thank you for allowing us to provide you with medical care today. We realize that you have many choices for your emergency care needs. Please choose us in the future for any continued health care needs. Lyla Maki MD    [address] [place] [address].   Office: [phone]            Recent Results (from the past 24 hour(s))   CBC WITH AUTOMATED DIFF    Collection Time: 02/01/18  6:36 PM   Result Value Ref Range    WBC 4.8 3.6 - 11.0 K/uL    RBC 4.74 3.80 - 5.20 M/uL    HGB 14.4 11.5 - 16.0 g/dL    HCT 43.0 35.0 - 47.0 %    MCV 90.7 80.0 - 99.0 FL    MCH 30.4 26.0 - 34.0 PG    MCHC 33.5 30.0 - 36.5 g/dL    RDW 11.7 11.5 - 14.5 %    PLATELET 397 678 - 851 K/uL    MPV 11.4 8.9 - 12.9 FL    NRBC 0.0 0  WBC    ABSOLUTE NRBC 0.00 0.00 - 0.01 K/uL    NEUTROPHILS 54 32 - 75 %    LYMPHOCYTES 38 12 - 49 %    MONOCYTES 7 5 - 13 %    EOSINOPHILS 1 0 - 7 %    BASOPHILS 0 0 - 1 %    IMMATURE GRANULOCYTES 0 0.0 - 0.5 %    ABS. NEUTROPHILS 2.7 1.8 - 8.0 K/UL    ABS. LYMPHOCYTES 1.8 0.8 - 3.5 K/UL    ABS. MONOCYTES 0.3 0.0 - 1.0 K/UL    ABS. EOSINOPHILS 0.0 0.0 - 0.4 K/UL    ABS. BASOPHILS 0.0 0.0 - 0.1 K/UL    ABS. IMM.  GRANS. 0.0 0.00 - 0.04 K/UL    DF SMEAR SCANNED      RBC COMMENTS NORMOCYTIC, NORMOCHROMIC     METABOLIC PANEL, COMPREHENSIVE    Collection Time: 02/01/18  6:36 PM   Result Value Ref Range    Sodium 140 136 - 145 mmol/L    Potassium 3.5 3.5 - 5.1 mmol/L    Chloride 107 97 - 108 mmol/L    CO2 21 21 - 32 mmol/L    Anion gap 12 5 - 15 mmol/L    Glucose 123 (H) 65 - 100 mg/dL    BUN 11 6 - 20 MG/DL    Creatinine 0.83 0.55 - 1.02 MG/DL    BUN/Creatinine ratio 13 12 - 20      GFR est AA >60 >60 ml/min/1.73m2    GFR est non-AA >60 >60 ml/min/1.73m2    Calcium 8.8 8.5 - 10.1 MG/DL    Bilirubin, total 0.2 0.2 - 1.0 MG/DL    ALT (SGPT) 30 12 - 78 U/L    AST (SGOT) 13 (L) 15 - 37 U/L    Alk. phosphatase 75 45 - 117 U/L    Protein, total 7.5 6.4 - 8.2 g/dL    Albumin 3.8 3.5 - 5.0 g/dL    Globulin 3.7 2.0 - 4.0 g/dL    A-G Ratio 1.0 (L) 1.1 - 2.2     CK W/ REFLX CKMB    Collection Time: 02/01/18  6:36 PM   Result Value Ref Range    CK 57 26 - 192 U/L   TROPONIN I    Collection Time: 02/01/18  6:36 PM   Result Value Ref Range    Troponin-I, Qt. <0.04 <0.05 ng/mL       Xr Chest Pa Lat    Result Date: 2/1/2018  EXAM:  XR CHEST PA LAT INDICATION:   Left-sided chest pressure and pain COMPARISON: 7/31/2016. FINDINGS: PA and lateral radiographs of the chest demonstrate clear lungs. The cardiac and mediastinal contours and pulmonary vascularity are normal.  The bones and soft tissues are within normal limits.      IMPRESSION: No acute finding

## 2018-04-13 RX ORDER — CHOLECALCIFEROL (VITAMIN D3) 125 MCG
3 CAPSULE ORAL
COMMUNITY
End: 2018-04-17

## 2018-04-13 NOTE — PERIOP NOTES
Spoke with patient to obtain information prior to PAT appointment. She went to the ER at the beginning of Feb 2018 and reason is listed chest pain. They recommend she see cardiology for a stress test. She states that she had the flu for about one month and had a terrible cough prior to going to ER. She thinks the cough made her chest hurt as she has not had any further chest pain and no SOB. She does not feel like she needs the stress test at this time.

## 2018-04-17 ENCOUNTER — HOSPITAL ENCOUNTER (OUTPATIENT)
Dept: PREADMISSION TESTING | Age: 48
Discharge: HOME | End: 2018-04-17
Payer: COMMERCIAL

## 2018-04-17 VITALS
OXYGEN SATURATION: 100 % | SYSTOLIC BLOOD PRESSURE: 120 MMHG | WEIGHT: 256.84 LBS | HEART RATE: 67 BPM | RESPIRATION RATE: 18 BRPM | DIASTOLIC BLOOD PRESSURE: 82 MMHG | HEIGHT: 70 IN | TEMPERATURE: 98.3 F | BODY MASS INDEX: 36.77 KG/M2

## 2018-04-17 LAB
ABO + RH BLD: NORMAL
ALBUMIN SERPL-MCNC: 3.6 G/DL (ref 3.5–5)
ALBUMIN/GLOB SERPL: 1.2 {RATIO} (ref 1.1–2.2)
ALP SERPL-CCNC: 76 U/L (ref 45–117)
ALT SERPL-CCNC: 43 U/L (ref 12–78)
ANION GAP SERPL CALC-SCNC: 8 MMOL/L (ref 5–15)
APPEARANCE UR: ABNORMAL
APTT PPP: 29.6 SEC (ref 22.1–32)
AST SERPL-CCNC: 19 U/L (ref 15–37)
ATRIAL RATE: 61 BPM
BACTERIA URNS QL MICRO: NEGATIVE /HPF
BASOPHILS # BLD: 0 K/UL (ref 0–0.1)
BASOPHILS NFR BLD: 0 % (ref 0–1)
BILIRUB SERPL-MCNC: 0.2 MG/DL (ref 0.2–1)
BILIRUB UR QL: NEGATIVE
BLOOD GROUP ANTIBODIES SERPL: NORMAL
BUN SERPL-MCNC: 14 MG/DL (ref 6–20)
BUN/CREAT SERPL: 16 (ref 12–20)
CALCIUM SERPL-MCNC: 8.6 MG/DL (ref 8.5–10.1)
CALCULATED P AXIS, ECG09: 30 DEGREES
CALCULATED R AXIS, ECG10: 27 DEGREES
CALCULATED T AXIS, ECG11: 36 DEGREES
CHLORIDE SERPL-SCNC: 108 MMOL/L (ref 97–108)
CO2 SERPL-SCNC: 22 MMOL/L (ref 21–32)
COLOR UR: ABNORMAL
CREAT SERPL-MCNC: 0.87 MG/DL (ref 0.55–1.02)
DIAGNOSIS, 93000: NORMAL
DIFFERENTIAL METHOD BLD: NORMAL
EOSINOPHIL # BLD: 0 K/UL (ref 0–0.4)
EOSINOPHIL NFR BLD: 1 % (ref 0–7)
EPITH CASTS URNS QL MICRO: ABNORMAL /LPF
ERYTHROCYTE [DISTWIDTH] IN BLOOD BY AUTOMATED COUNT: 11.9 % (ref 11.5–14.5)
EST. AVERAGE GLUCOSE BLD GHB EST-MCNC: 103 MG/DL
GLOBULIN SER CALC-MCNC: 3.1 G/DL (ref 2–4)
GLUCOSE SERPL-MCNC: 85 MG/DL (ref 65–100)
GLUCOSE UR STRIP.AUTO-MCNC: NEGATIVE MG/DL
HBA1C MFR BLD: 5.2 % (ref 4.2–6.3)
HCT VFR BLD AUTO: 41.5 % (ref 35–47)
HGB BLD-MCNC: 13.7 G/DL (ref 11.5–16)
HGB UR QL STRIP: NEGATIVE
HYALINE CASTS URNS QL MICRO: ABNORMAL /LPF (ref 0–5)
IMM GRANULOCYTES # BLD: 0 K/UL (ref 0–0.04)
IMM GRANULOCYTES NFR BLD AUTO: 0 % (ref 0–0.5)
INR PPP: 1 (ref 0.9–1.1)
KETONES UR QL STRIP.AUTO: NEGATIVE MG/DL
LEUKOCYTE ESTERASE UR QL STRIP.AUTO: NEGATIVE
LYMPHOCYTES # BLD: 1.7 K/UL (ref 0.8–3.5)
LYMPHOCYTES NFR BLD: 39 % (ref 12–49)
MCH RBC QN AUTO: 30.4 PG (ref 26–34)
MCHC RBC AUTO-ENTMCNC: 33 G/DL (ref 30–36.5)
MCV RBC AUTO: 92 FL (ref 80–99)
MONOCYTES # BLD: 0.3 K/UL (ref 0–1)
MONOCYTES NFR BLD: 7 % (ref 5–13)
NEUTS SEG # BLD: 2.3 K/UL (ref 1.8–8)
NEUTS SEG NFR BLD: 52 % (ref 32–75)
NITRITE UR QL STRIP.AUTO: NEGATIVE
NRBC # BLD: 0 K/UL (ref 0–0.01)
NRBC BLD-RTO: 0 PER 100 WBC
P-R INTERVAL, ECG05: 154 MS
PH UR STRIP: 7.5 [PH] (ref 5–8)
PLATELET # BLD AUTO: 249 K/UL (ref 150–400)
PMV BLD AUTO: 11 FL (ref 8.9–12.9)
POTASSIUM SERPL-SCNC: 4.2 MMOL/L (ref 3.5–5.1)
PROT SERPL-MCNC: 6.7 G/DL (ref 6.4–8.2)
PROT UR STRIP-MCNC: NEGATIVE MG/DL
PROTHROMBIN TIME: 9.8 SEC (ref 9–11.1)
Q-T INTERVAL, ECG07: 396 MS
QRS DURATION, ECG06: 88 MS
QTC CALCULATION (BEZET), ECG08: 398 MS
RBC # BLD AUTO: 4.51 M/UL (ref 3.8–5.2)
RBC #/AREA URNS HPF: ABNORMAL /HPF (ref 0–5)
SODIUM SERPL-SCNC: 138 MMOL/L (ref 136–145)
SP GR UR REFRACTOMETRY: 1.01 (ref 1–1.03)
SPECIMEN EXP DATE BLD: NORMAL
THERAPEUTIC RANGE,PTTT: NORMAL SECS (ref 58–77)
UA: UC IF INDICATED,UAUC: ABNORMAL
UROBILINOGEN UR QL STRIP.AUTO: 0.2 EU/DL (ref 0.2–1)
VENTRICULAR RATE, ECG03: 61 BPM
WBC # BLD AUTO: 4.5 K/UL (ref 3.6–11)
WBC URNS QL MICRO: ABNORMAL /HPF (ref 0–4)

## 2018-04-17 PROCEDURE — 86850 RBC ANTIBODY SCREEN: CPT | Performed by: ORTHOPAEDIC SURGERY

## 2018-04-17 PROCEDURE — 81001 URINALYSIS AUTO W/SCOPE: CPT | Performed by: ORTHOPAEDIC SURGERY

## 2018-04-17 PROCEDURE — 85025 COMPLETE CBC W/AUTO DIFF WBC: CPT | Performed by: ORTHOPAEDIC SURGERY

## 2018-04-17 PROCEDURE — 85730 THROMBOPLASTIN TIME PARTIAL: CPT | Performed by: ORTHOPAEDIC SURGERY

## 2018-04-17 PROCEDURE — 85610 PROTHROMBIN TIME: CPT | Performed by: ORTHOPAEDIC SURGERY

## 2018-04-17 PROCEDURE — 93005 ELECTROCARDIOGRAM TRACING: CPT

## 2018-04-17 PROCEDURE — 80053 COMPREHEN METABOLIC PANEL: CPT | Performed by: ORTHOPAEDIC SURGERY

## 2018-04-17 PROCEDURE — 36415 COLL VENOUS BLD VENIPUNCTURE: CPT | Performed by: ORTHOPAEDIC SURGERY

## 2018-04-17 PROCEDURE — 83036 HEMOGLOBIN GLYCOSYLATED A1C: CPT | Performed by: ORTHOPAEDIC SURGERY

## 2018-04-17 RX ORDER — IBUPROFEN 200 MG
200 TABLET ORAL
COMMUNITY
End: 2018-04-25

## 2018-04-17 NOTE — PERIOP NOTES
[address] [facility], [address]    MAIN OR [phone]    MAIN PRE OP [phone]    AMBULATORY PRE OP [phone]    PRE-ADMISSION TESTING [phone]       Surgery Date:   4/17/2018      Is surgery arrival time given by surgeon? NO  If NO, [facility] staff will call you between 3 and 7pm the day before your surgery with your arrival time. (If your surgery is on a Monday, we will call you the Friday before.)    Call [phone] after 7pm Monday-Friday if you did not receive your arrival time. Answers to Common Questions   When You  Arrive   Arrive at the [facility] [address] [facility] on the day of your surgery  Have your insurance card, photo ID, and any copayment (if needed)     Food   and   Drink   NO food or drink after midnight the night before surgery    This means NO water, gum, mints, coffee, juice, etc.  No alcohol (beer, wine, liquor) 24 hours before and after surgery     Medicine to   TAKE   Morning of Surgery   MEDICATIONS TO TAKE THE MORNING OF SURGERY WITH A SIP OF WATER:    Topirimate, Relpax, Lortab if needed. Stop your iron, Advil, and multivitamin today.      Medicine  To  STOP   FOR PAIN   NO Aspirin for pain    NO Non-Steroidal Anti-Inflammatory Drugs (NSAIDs:   for example, Ibuprofen (Advil, Motrin), Naproxen (Aleve)   STOP herbal supplements and vitamins 1 week before surgery   You can take Tylenol - follow instructions on the bottle     Blood  Thinners    If you take Aspirin, Plavix, Coumadin, blood-thinning or anti-clot medicine, talk to your surgeon and/or follow the instructions from the doctor who told you to take that medicine     Clothing  [address] [place] [address] Rd Wear loose, comfortable clothes   Wear glasses instead of contacts   Leave money, jewelry and valuables at home   No make-up, particularly mascara, the day of surgery   REMOVE ALL piercings, rings, and jewelry - leave at home   Wear hair loose or down; no pony-tails, buns, or metal hair clips    BATHING   Follow all special bath instructions (for total joint replacement, spine and bowel surgeries.)   If you shower the morning of surgery, please do not apply any lotions, powders, or deodorants afterwards. Do not shave or trim anywhere 24 hours before surgery. Going Home  or  Spending the Night    SAME-DAY SURGERY: You must have a responsible adult drive you home and stay with you 24 hours after surgery   ADMITS: If your doctor is keeping you into the hospital after surgery, leave personal belongings/luggage in your car until you have a hospital room number. Hospital discharge time is 12 noon  Drivers must be here before 12 noon unless you are told differently         Follow all instructions so your surgery wont be cancelled. Please, be on time. If a situation occurs and you are delayed the day of surgery, call [phone] or [phone]. If your physical condition changes (like a fever, cold, flu, etc.) call your surgeon as soon as possible. The Preadmission Testing staff can be reached at [phone]. OTHER SPECIAL INSTRUCTIONS:  Free  parking 7am-5pm    The patient was contacted  in person. She  verbalize  understanding of all instructions and does not  need reinforcement.

## 2018-04-17 NOTE — H&P
PAT Pre-Op History & Physical    Patient: Juan Francisco Hamilton                  MRN: [id number]          SSN: [id number]  YOB: 1970          Age: 56 y.o. Sex: female                Subjective:     Patient is a 56 y.o.  female who presents with history of chronic lower back that has been going on for years. Starting around 2018 the pain became severe. She has been going to pain management for years but lately she says it has not been working. Pain is in the lower center of her spine and radiates down her left leg to ankle. She can feel her back \"clicking\" when she walks. She has some numbness and tingling in her left leg. Pain ranges from 3/10-10/10 and too much activity will make it worse. Sleep has been affected as she cannot sleep on her back. She has failed injections, pain management, chiropractor, narcotics, muscle relaxants, ice/heat application and TENS unit. The patient was evaluated in the surgeon's office and it was determined that the most appropriate plan of care is to proceed with surgical intervention. Patient's PCP Lashonda Dumont MD      Past Medical History:   Diagnosis Date    Anemia     chronic    Chronic pain     Migraine     Obesity (BMI 35.0-39.9 without comorbidity) 2018    Stool color black     Because she takes iron      Past Surgical History:   Procedure Laterality Date    HX  SECTION      HX LUMBAR DISKECTOMY      L 5 S1    HX TUBAL LIGATION        Prior to Admission medications    Medication Sig Start Date End Date Taking? Authorizing Provider   ibuprofen (ADVIL) 200 mg tablet Take 200 mg by mouth every six (6) hours as needed for Pain. Yes Historical Provider   RELPAX 40 mg tablet TAKE 1 TABLET BY MOUTH ONCE AS NEEDED.  MAY REPEAT IN 2 HOURS IF NEEDED 17  Yes Girma Brown MD   topiramate (TOPAMAX) 200 mg tablet TAKE 1 TABLET EVERY MORNINGAND TAKE 1 AND 1/2 TABLETS IN THE EVENING 5/15/17  Yes Clifton Mcgoawn ROSENDO Acosta   ferrous sulfate (IRON) 325 mg (65 mg iron) tablet Take  by mouth Daily (before breakfast). Yes Historical Provider   multivitamin (ONE A DAY) tablet Take 1 tablet by mouth daily. Yes Historical Provider   HYDROcodone-acetaminophen (LORTAB) 5-500 mg per tablet Take 1 Tab by mouth as needed (Only takes it generally on Sunday). Yes Historical Provider     Current Outpatient Prescriptions   Medication Sig    ibuprofen (ADVIL) 200 mg tablet Take 200 mg by mouth every six (6) hours as needed for Pain.  RELPAX 40 mg tablet TAKE 1 TABLET BY MOUTH ONCE AS NEEDED. MAY REPEAT IN 2 HOURS IF NEEDED    topiramate (TOPAMAX) 200 mg tablet TAKE 1 TABLET EVERY MORNINGAND TAKE 1 AND 1/2 TABLETS IN THE EVENING    ferrous sulfate (IRON) 325 mg (65 mg iron) tablet Take  by mouth Daily (before breakfast).  multivitamin (ONE A DAY) tablet Take 1 tablet by mouth daily.  HYDROcodone-acetaminophen (LORTAB) 5-500 mg per tablet Take 1 Tab by mouth as needed (Only takes it generally on Sunday). No current facility-administered medications for this encounter. Allergies   Allergen Reactions    Topamax [Topiramate] Other (comments)     Low grade fever   Only with brand name ok with generic      Social History   Substance Use Topics    Smoking status: Never Smoker    Smokeless tobacco: Never Used    Alcohol use 0.6 oz/week     1 Glasses of wine per week      History   Drug Use No     Family History   Problem Relation Age of Onset    Suicide Father     Depression Father     Cancer Father      lung,    Depression Mother     Other Sister      Kidney Issues    No Known Problems Sister     No Known Problems Sister     Hypertension Maternal Grandfather     Diabetes Maternal Grandfather     Cancer Paternal Grandmother      lung         Review of Systems    Patient denies difficulty swallowing, mouth sores, or loose teeth. Patient denies any recent dental procedures or any planned prior to surgery. Patient denies chest pain, tightness, pain radiating down left arm, palpitations. Denies dizziness, visual disturbances, or lightheadedness. Patient denies shortness of breath, wheezing, cough, fever, or chills. Patient denies diarrhea or abdominal pain. Patient has chronic constipation r/t narcotics. Patient denies urinary problems including dysuria, hesitancy, urgency, or incontinence. Denies skin breakdown, rashes, insect bites or open area. Objective:     Vital Signs: 98.3, 67, 18, 100%, 120/82    Body mass index is 36.85 kg/(m^2). Wt Readings from Last 1 Encounters:   04/17/18 116.5 kg (256 lb 13.4 oz)        Physical Exam:     General: Pleasant,  cooperative, no apparent distress, appears stated age. Eyes: Conjunctivae/corneas clear. EOMs intact. Nose: Nares normal.   Mouth/Throat: Lips, mucosa, and tongue normal. Teeth and gums normal.   Lungs: Clear to auscultation bilaterally. Heart: Regular rate and rhythm, S1, S2 normal. No murmur, click, rub or gallop. Abdomen: Soft, non-tender. Bowel sounds normal. No distention. Musculoskeletal:  Limited bending ROM. Tender lumbar spine   Extremities:  Extremities normal, atraumatic, no cyanosis or edema. Calves                                 supple, non tender to palpation. Pulses: 2+ and symmetric bilateral upper extremities. Cap. refill <2 seconds   Skin: Skin color, texture, turgor normal. No visible open areas, examined fully clothed   Neurologic: CN II-XII grossly intact. Alert and oriented x3.     Labs:   Recent Results (from the past 67 hour(s))   CULTURE, MRSA    Collection Time: 04/17/18 10:04 AM   Result Value Ref Range    Special Requests: NO SPECIAL REQUESTS      Culture result: MRSA NOT PRESENT AT 19 HOURS     TYPE & SCREEN    Collection Time: 04/17/18 10:59 AM   Result Value Ref Range    Crossmatch Expiration 04/26/2018     ABO/Rh(D) A NEGATIVE     Antibody screen NEG    CBC WITH AUTOMATED DIFF    Collection Time: 04/17/18 10:59 AM Result Value Ref Range    WBC 4.5 3.6 - 11.0 K/uL    RBC 4.51 3.80 - 5.20 M/uL    HGB 13.7 11.5 - 16.0 g/dL    HCT 41.5 35.0 - 47.0 %    MCV 92.0 80.0 - 99.0 FL    MCH 30.4 26.0 - 34.0 PG    MCHC 33.0 30.0 - 36.5 g/dL    RDW 11.9 11.5 - 14.5 %    PLATELET 671 089 - 252 K/uL    MPV 11.0 8.9 - 12.9 FL    NRBC 0.0 0  WBC    ABSOLUTE NRBC 0.00 0.00 - 0.01 K/uL    NEUTROPHILS 52 32 - 75 %    LYMPHOCYTES 39 12 - 49 %    MONOCYTES 7 5 - 13 %    EOSINOPHILS 1 0 - 7 %    BASOPHILS 0 0 - 1 %    IMMATURE GRANULOCYTES 0 0.0 - 0.5 %    ABS. NEUTROPHILS 2.3 1.8 - 8.0 K/UL    ABS. LYMPHOCYTES 1.7 0.8 - 3.5 K/UL    ABS. MONOCYTES 0.3 0.0 - 1.0 K/UL    ABS. EOSINOPHILS 0.0 0.0 - 0.4 K/UL    ABS. BASOPHILS 0.0 0.0 - 0.1 K/UL    ABS. IMM. GRANS. 0.0 0.00 - 0.04 K/UL    DF AUTOMATED     METABOLIC PANEL, COMPREHENSIVE    Collection Time: 04/17/18 10:59 AM   Result Value Ref Range    Sodium 138 136 - 145 mmol/L    Potassium 4.2 3.5 - 5.1 mmol/L    Chloride 108 97 - 108 mmol/L    CO2 22 21 - 32 mmol/L    Anion gap 8 5 - 15 mmol/L    Glucose 85 65 - 100 mg/dL    BUN 14 6 - 20 MG/DL    Creatinine 0.87 0.55 - 1.02 MG/DL    BUN/Creatinine ratio 16 12 - 20      GFR est AA >60 >60 ml/min/1.73m2    GFR est non-AA >60 >60 ml/min/1.73m2    Calcium 8.6 8.5 - 10.1 MG/DL    Bilirubin, total 0.2 0.2 - 1.0 MG/DL    ALT (SGPT) 43 12 - 78 U/L    AST (SGOT) 19 15 - 37 U/L    Alk.  phosphatase 76 45 - 117 U/L    Protein, total 6.7 6.4 - 8.2 g/dL    Albumin 3.6 3.5 - 5.0 g/dL    Globulin 3.1 2.0 - 4.0 g/dL    A-G Ratio 1.2 1.1 - 2.2     HEMOGLOBIN A1C WITH EAG    Collection Time: 04/17/18 10:59 AM   Result Value Ref Range    Hemoglobin A1c 5.2 4.2 - 6.3 %    Est. average glucose 103 mg/dL   PROTHROMBIN TIME + INR    Collection Time: 04/17/18 10:59 AM   Result Value Ref Range    INR 1.0 0.9 - 1.1      Prothrombin time 9.8 9.0 - 11.1 sec   PTT    Collection Time: 04/17/18 10:59 AM   Result Value Ref Range    aPTT 29.6 22.1 - 32.0 sec    aPTT, therapeutic range     58.0 - 77.0 SECS   URINALYSIS W/ REFLEX CULTURE    Collection Time: 04/17/18 10:59 AM   Result Value Ref Range    Color YELLOW/STRAW      Appearance CLOUDY (A) CLEAR      Specific gravity 1.011 1.003 - 1.030      pH (UA) 7.5 5.0 - 8.0      Protein NEGATIVE  NEG mg/dL    Glucose NEGATIVE  NEG mg/dL    Ketone NEGATIVE  NEG mg/dL    Bilirubin NEGATIVE  NEG      Blood NEGATIVE  NEG      Urobilinogen 0.2 0.2 - 1.0 EU/dL    Nitrites NEGATIVE  NEG      Leukocyte Esterase NEGATIVE  NEG      WBC 0-4 0 - 4 /hpf    RBC 0-5 0 - 5 /hpf    Epithelial cells MODERATE (A) FEW /lpf    Bacteria NEGATIVE  NEG /hpf    UA:UC IF INDICATED CULTURE NOT INDICATED BY UA RESULT CNI      Hyaline cast 0-2 0 - 5 /lpf   EKG, 12 LEAD, INITIAL    Collection Time: 04/17/18 11:15 AM   Result Value Ref Range    Ventricular Rate 61 BPM    Atrial Rate 61 BPM    P-R Interval 154 ms    QRS Duration 88 ms    Q-T Interval 396 ms    QTC Calculation (Bezet) 398 ms    Calculated P Axis 30 degrees    Calculated R Axis 27 degrees    Calculated T Axis 36 degrees    Diagnosis       Normal sinus rhythm  Normal ECG  When compared with ECG of 01-FEB-2018 17:21,  No significant change was found  Confirmed by Ashley Riley MD, [id number] ([id number]) on 4/17/2018 3:20:39 PM         Assessment:     Acute left lumbar radiculopathy [M54.16]  Lumbar stenosis with neurogenic claudication [M48.062]  Lumbar adjacent segment disease with spondylolisthesis [M51.36, M43.16]  Chronic Pain    Plan:     Scheduled for L4-L5, L5-S1 ANTERIOR LUMBAR INTERBODY FUSION WITH INSTRUMENTATION     All labs reviewed and unremarkable. EKG reviewed.  MRSA pending    Pain control may be an issue post-op    Sharath Mccall NP

## 2018-04-18 LAB
BACTERIA SPEC CULT: NORMAL
BACTERIA SPEC CULT: NORMAL
SERVICE CMNT-IMP: NORMAL

## 2018-04-20 ENCOUNTER — ANESTHESIA EVENT (OUTPATIENT)
Dept: SURGERY | Age: 48
DRG: 460 | End: 2018-04-20
Payer: COMMERCIAL

## 2018-04-23 ENCOUNTER — ANESTHESIA (OUTPATIENT)
Dept: SURGERY | Age: 48
DRG: 460 | End: 2018-04-23
Payer: COMMERCIAL

## 2018-04-23 ENCOUNTER — HOSPITAL ENCOUNTER (INPATIENT)
Age: 48
LOS: 2 days | Discharge: HOME HEALTH | DRG: 460 | End: 2018-04-25
Attending: ORTHOPAEDIC SURGERY | Admitting: ORTHOPAEDIC SURGERY
Payer: COMMERCIAL

## 2018-04-23 ENCOUNTER — APPOINTMENT (OUTPATIENT)
Dept: GENERAL RADIOLOGY | Age: 48
DRG: 460 | End: 2018-04-23
Attending: ORTHOPAEDIC SURGERY
Payer: COMMERCIAL

## 2018-04-23 DIAGNOSIS — M51.9 LUMBAR DISC DISORDER: ICD-10-CM

## 2018-04-23 DIAGNOSIS — M43.16 SPONDYLOLISTHESIS, LUMBAR REGION: Primary | ICD-10-CM

## 2018-04-23 LAB — HCG UR QL: NEGATIVE

## 2018-04-23 PROCEDURE — 74011250636 HC RX REV CODE- 250/636: Performed by: ANESTHESIOLOGY

## 2018-04-23 PROCEDURE — 76010000173 HC OR TIME 3 TO 3.5 HR INTENSV-TIER 1: Performed by: ORTHOPAEDIC SURGERY

## 2018-04-23 PROCEDURE — C1713 ANCHOR/SCREW BN/BN,TIS/BN: HCPCS | Performed by: ORTHOPAEDIC SURGERY

## 2018-04-23 PROCEDURE — 77030037134 HC WRAP COMPR BACK THER SOLM -B

## 2018-04-23 PROCEDURE — 0SG30A0 FUSION OF LUMBOSACRAL JOINT WITH INTERBODY FUSION DEVICE, ANTERIOR APPROACH, ANTERIOR COLUMN, OPEN APPROACH: ICD-10-PCS | Performed by: ORTHOPAEDIC SURGERY

## 2018-04-23 PROCEDURE — 77030028763: Performed by: ORTHOPAEDIC SURGERY

## 2018-04-23 PROCEDURE — 77030031753 HC SHR ENDO COAG HARM J&J -E: Performed by: ORTHOPAEDIC SURGERY

## 2018-04-23 PROCEDURE — 77030011264 HC ELECTRD BLD EXT COVD -A: Performed by: ORTHOPAEDIC SURGERY

## 2018-04-23 PROCEDURE — 74011000250 HC RX REV CODE- 250

## 2018-04-23 PROCEDURE — 77030002996 HC SUT SLK J&J -A: Performed by: ORTHOPAEDIC SURGERY

## 2018-04-23 PROCEDURE — 51798 US URINE CAPACITY MEASURE: CPT

## 2018-04-23 PROCEDURE — 74011250636 HC RX REV CODE- 250/636

## 2018-04-23 PROCEDURE — 77030003666 HC NDL SPINAL BD -A: Performed by: ORTHOPAEDIC SURGERY

## 2018-04-23 PROCEDURE — 81025 URINE PREGNANCY TEST: CPT

## 2018-04-23 PROCEDURE — 0SB40ZZ EXCISION OF LUMBOSACRAL DISC, OPEN APPROACH: ICD-10-PCS | Performed by: ORTHOPAEDIC SURGERY

## 2018-04-23 PROCEDURE — 77030019908 HC STETH ESOPH SIMS -A: Performed by: NURSE ANESTHETIST, CERTIFIED REGISTERED

## 2018-04-23 PROCEDURE — 77030026438 HC STYL ET INTUB CARD -A: Performed by: NURSE ANESTHETIST, CERTIFIED REGISTERED

## 2018-04-23 PROCEDURE — 76210000002 HC OR PH I REC 3 TO 3.5 HR: Performed by: ORTHOPAEDIC SURGERY

## 2018-04-23 PROCEDURE — 65270000029 HC RM PRIVATE

## 2018-04-23 PROCEDURE — 77030026188 HC BN CANC CHP CRSH PR LIFV -E: Performed by: ORTHOPAEDIC SURGERY

## 2018-04-23 PROCEDURE — 77030008467 HC STPLR SKN COVD -B: Performed by: ORTHOPAEDIC SURGERY

## 2018-04-23 PROCEDURE — 74011000272 HC RX REV CODE- 272: Performed by: ORTHOPAEDIC SURGERY

## 2018-04-23 PROCEDURE — 77030013079 HC BLNKT BAIR HGGR 3M -A: Performed by: NURSE ANESTHETIST, CERTIFIED REGISTERED

## 2018-04-23 PROCEDURE — 76001 XR FLUOROSCOPY OVER 60 MINUTES: CPT

## 2018-04-23 PROCEDURE — 3E0U0GB INTRODUCTION OF RECOMBINANT BONE MORPHOGENETIC PROTEIN INTO JOINTS, OPEN APPROACH: ICD-10-PCS | Performed by: ORTHOPAEDIC SURGERY

## 2018-04-23 PROCEDURE — 74011250637 HC RX REV CODE- 250/637: Performed by: ORTHOPAEDIC SURGERY

## 2018-04-23 PROCEDURE — 77030020782 HC GWN BAIR PAWS FLX 3M -B

## 2018-04-23 PROCEDURE — 76060000037 HC ANESTHESIA 3 TO 3.5 HR: Performed by: ORTHOPAEDIC SURGERY

## 2018-04-23 PROCEDURE — 77030010512 HC APPL CLP LIG J&J -C: Performed by: ORTHOPAEDIC SURGERY

## 2018-04-23 PROCEDURE — 77030034850: Performed by: ORTHOPAEDIC SURGERY

## 2018-04-23 PROCEDURE — 74011250636 HC RX REV CODE- 250/636: Performed by: ORTHOPAEDIC SURGERY

## 2018-04-23 PROCEDURE — 77030018836 HC SOL IRR NACL ICUM -A: Performed by: ORTHOPAEDIC SURGERY

## 2018-04-23 PROCEDURE — 77030008684 HC TU ET CUF COVD -B: Performed by: NURSE ANESTHETIST, CERTIFIED REGISTERED

## 2018-04-23 PROCEDURE — 77030002966 HC SUT PDS J&J -A: Performed by: ORTHOPAEDIC SURGERY

## 2018-04-23 PROCEDURE — 0SG00A0 FUSION OF LUMBAR VERTEBRAL JOINT WITH INTERBODY FUSION DEVICE, ANTERIOR APPROACH, ANTERIOR COLUMN, OPEN APPROACH: ICD-10-PCS | Performed by: ORTHOPAEDIC SURGERY

## 2018-04-23 PROCEDURE — 0SB20ZZ EXCISION OF LUMBAR VERTEBRAL DISC, OPEN APPROACH: ICD-10-PCS | Performed by: ORTHOPAEDIC SURGERY

## 2018-04-23 PROCEDURE — 77030032490 HC SLV COMPR SCD KNE COVD -B: Performed by: ORTHOPAEDIC SURGERY

## 2018-04-23 PROCEDURE — 77030003196 HC GRFT RECOMB BN MEDT -K1: Performed by: ORTHOPAEDIC SURGERY

## 2018-04-23 PROCEDURE — 77030031139 HC SUT VCRL2 J&J -A: Performed by: ORTHOPAEDIC SURGERY

## 2018-04-23 PROCEDURE — 74011000250 HC RX REV CODE- 250: Performed by: ORTHOPAEDIC SURGERY

## 2018-04-23 PROCEDURE — 74011000250 HC RX REV CODE- 250: Performed by: ANESTHESIOLOGY

## 2018-04-23 PROCEDURE — 77030018846 HC SOL IRR STRL H20 ICUM -A: Performed by: ORTHOPAEDIC SURGERY

## 2018-04-23 DEVICE — BONE GRAFT KIT 7510800 INFUSE LARGE II
Type: IMPLANTABLE DEVICE | Site: SPINE LUMBAR | Status: FUNCTIONAL
Brand: INFUSE® BONE GRAFT

## 2018-04-23 DEVICE — ROI-A ANCHORING PLATE M
Type: IMPLANTABLE DEVICE | Site: SPINE LUMBAR | Status: FUNCTIONAL
Brand: ROI-A

## 2018-04-23 DEVICE — BONE CHIP CANC CRSH 1-8MM 30ML --: Type: IMPLANTABLE DEVICE | Site: SPINE LUMBAR | Status: FUNCTIONAL

## 2018-04-23 DEVICE — ROI-A MEDIAN IMPLANT PXL 27X30 H14 10°
Type: IMPLANTABLE DEVICE | Site: SPINE LUMBAR | Status: FUNCTIONAL
Brand: ROI-A

## 2018-04-23 RX ORDER — LIDOCAINE HYDROCHLORIDE 10 MG/ML
0.1 INJECTION, SOLUTION EPIDURAL; INFILTRATION; INTRACAUDAL; PERINEURAL AS NEEDED
Status: DISCONTINUED | OUTPATIENT
Start: 2018-04-23 | End: 2018-04-23 | Stop reason: HOSPADM

## 2018-04-23 RX ORDER — FAMOTIDINE 20 MG/1
20 TABLET, FILM COATED ORAL 2 TIMES DAILY
Status: DISCONTINUED | OUTPATIENT
Start: 2018-04-23 | End: 2018-04-25 | Stop reason: HOSPADM

## 2018-04-23 RX ORDER — NEOSTIGMINE METHYLSULFATE 1 MG/ML
INJECTION INTRAVENOUS AS NEEDED
Status: DISCONTINUED | OUTPATIENT
Start: 2018-04-23 | End: 2018-04-23 | Stop reason: HOSPADM

## 2018-04-23 RX ORDER — PROMETHAZINE HYDROCHLORIDE 25 MG/1
25 TABLET ORAL
Qty: 60 TAB | Refills: 2 | Status: SHIPPED | OUTPATIENT
Start: 2018-04-23 | End: 2018-08-31 | Stop reason: ALTCHOICE

## 2018-04-23 RX ORDER — SODIUM CHLORIDE 0.9 % (FLUSH) 0.9 %
5-10 SYRINGE (ML) INJECTION AS NEEDED
Status: DISCONTINUED | OUTPATIENT
Start: 2018-04-23 | End: 2018-04-23 | Stop reason: HOSPADM

## 2018-04-23 RX ORDER — SODIUM CHLORIDE 0.9 % (FLUSH) 0.9 %
5-10 SYRINGE (ML) INJECTION EVERY 8 HOURS
Status: DISCONTINUED | OUTPATIENT
Start: 2018-04-23 | End: 2018-04-23 | Stop reason: HOSPADM

## 2018-04-23 RX ORDER — FACIAL-BODY WIPES
10 EACH TOPICAL DAILY PRN
Status: DISCONTINUED | OUTPATIENT
Start: 2018-04-25 | End: 2018-04-25 | Stop reason: HOSPADM

## 2018-04-23 RX ORDER — ROCURONIUM BROMIDE 10 MG/ML
INJECTION, SOLUTION INTRAVENOUS AS NEEDED
Status: DISCONTINUED | OUTPATIENT
Start: 2018-04-23 | End: 2018-04-23 | Stop reason: HOSPADM

## 2018-04-23 RX ORDER — PROPOFOL 10 MG/ML
INJECTION, EMULSION INTRAVENOUS AS NEEDED
Status: DISCONTINUED | OUTPATIENT
Start: 2018-04-23 | End: 2018-04-23 | Stop reason: HOSPADM

## 2018-04-23 RX ORDER — BISMUTH SUBSALICYLATE 262 MG
1 TABLET,CHEWABLE ORAL DAILY
Status: DISCONTINUED | OUTPATIENT
Start: 2018-04-24 | End: 2018-04-23 | Stop reason: CLARIF

## 2018-04-23 RX ORDER — ELETRIPTAN HYDROBROMIDE 40 MG/1
40 TABLET, FILM COATED ORAL AS NEEDED
Status: DISCONTINUED | OUTPATIENT
Start: 2018-04-23 | End: 2018-04-25 | Stop reason: HOSPADM

## 2018-04-23 RX ORDER — ONDANSETRON 2 MG/ML
4 INJECTION INTRAMUSCULAR; INTRAVENOUS AS NEEDED
Status: DISCONTINUED | OUTPATIENT
Start: 2018-04-23 | End: 2018-04-23 | Stop reason: HOSPADM

## 2018-04-23 RX ORDER — DEXAMETHASONE SODIUM PHOSPHATE 4 MG/ML
INJECTION, SOLUTION INTRA-ARTICULAR; INTRALESIONAL; INTRAMUSCULAR; INTRAVENOUS; SOFT TISSUE AS NEEDED
Status: DISCONTINUED | OUTPATIENT
Start: 2018-04-23 | End: 2018-04-23 | Stop reason: HOSPADM

## 2018-04-23 RX ORDER — SODIUM CHLORIDE, SODIUM LACTATE, POTASSIUM CHLORIDE, CALCIUM CHLORIDE 600; 310; 30; 20 MG/100ML; MG/100ML; MG/100ML; MG/100ML
INJECTION, SOLUTION INTRAVENOUS
Status: DISCONTINUED | OUTPATIENT
Start: 2018-04-23 | End: 2018-04-23 | Stop reason: HOSPADM

## 2018-04-23 RX ORDER — SODIUM CHLORIDE 0.9 % (FLUSH) 0.9 %
5-10 SYRINGE (ML) INJECTION AS NEEDED
Status: DISCONTINUED | OUTPATIENT
Start: 2018-04-23 | End: 2018-04-25 | Stop reason: HOSPADM

## 2018-04-23 RX ORDER — POLYETHYLENE GLYCOL 3350 17 G/17G
17 POWDER, FOR SOLUTION ORAL DAILY
Status: DISCONTINUED | OUTPATIENT
Start: 2018-04-24 | End: 2018-04-25 | Stop reason: HOSPADM

## 2018-04-23 RX ORDER — DIPHENHYDRAMINE HYDROCHLORIDE 50 MG/ML
12.5 INJECTION, SOLUTION INTRAMUSCULAR; INTRAVENOUS AS NEEDED
Status: DISCONTINUED | OUTPATIENT
Start: 2018-04-23 | End: 2018-04-23 | Stop reason: HOSPADM

## 2018-04-23 RX ORDER — SUCCINYLCHOLINE CHLORIDE 20 MG/ML
INJECTION INTRAMUSCULAR; INTRAVENOUS AS NEEDED
Status: DISCONTINUED | OUTPATIENT
Start: 2018-04-23 | End: 2018-04-23 | Stop reason: HOSPADM

## 2018-04-23 RX ORDER — TOPIRAMATE 200 MG/1
200 TABLET ORAL DAILY
COMMUNITY
End: 2018-05-08 | Stop reason: SDUPTHER

## 2018-04-23 RX ORDER — HYDROMORPHONE HYDROCHLORIDE 2 MG/ML
INJECTION, SOLUTION INTRAMUSCULAR; INTRAVENOUS; SUBCUTANEOUS AS NEEDED
Status: DISCONTINUED | OUTPATIENT
Start: 2018-04-23 | End: 2018-04-23 | Stop reason: HOSPADM

## 2018-04-23 RX ORDER — HYDROMORPHONE HYDROCHLORIDE 2 MG/ML
.25-1 INJECTION, SOLUTION INTRAMUSCULAR; INTRAVENOUS; SUBCUTANEOUS
Status: DISCONTINUED | OUTPATIENT
Start: 2018-04-23 | End: 2018-04-23 | Stop reason: HOSPADM

## 2018-04-23 RX ORDER — GLYCOPYRROLATE 0.2 MG/ML
INJECTION INTRAMUSCULAR; INTRAVENOUS AS NEEDED
Status: DISCONTINUED | OUTPATIENT
Start: 2018-04-23 | End: 2018-04-23 | Stop reason: HOSPADM

## 2018-04-23 RX ORDER — DIPHENHYDRAMINE HYDROCHLORIDE 50 MG/ML
12.5 INJECTION, SOLUTION INTRAMUSCULAR; INTRAVENOUS
Status: DISCONTINUED | OUTPATIENT
Start: 2018-04-23 | End: 2018-04-25 | Stop reason: HOSPADM

## 2018-04-23 RX ORDER — SODIUM CHLORIDE 9 MG/ML
125 INJECTION, SOLUTION INTRAVENOUS CONTINUOUS
Status: DISPENSED | OUTPATIENT
Start: 2018-04-23 | End: 2018-04-24

## 2018-04-23 RX ORDER — NALOXONE HYDROCHLORIDE 0.4 MG/ML
0.4 INJECTION, SOLUTION INTRAMUSCULAR; INTRAVENOUS; SUBCUTANEOUS AS NEEDED
Status: DISCONTINUED | OUTPATIENT
Start: 2018-04-23 | End: 2018-04-25 | Stop reason: HOSPADM

## 2018-04-23 RX ORDER — OXYCODONE HYDROCHLORIDE 5 MG/1
5 TABLET ORAL
Status: DISCONTINUED | OUTPATIENT
Start: 2018-04-23 | End: 2018-04-25 | Stop reason: HOSPADM

## 2018-04-23 RX ORDER — SODIUM CHLORIDE, SODIUM LACTATE, POTASSIUM CHLORIDE, CALCIUM CHLORIDE 600; 310; 30; 20 MG/100ML; MG/100ML; MG/100ML; MG/100ML
100 INJECTION, SOLUTION INTRAVENOUS CONTINUOUS
Status: DISCONTINUED | OUTPATIENT
Start: 2018-04-23 | End: 2018-04-23 | Stop reason: HOSPADM

## 2018-04-23 RX ORDER — THERA TABS 400 MCG
1 TAB ORAL DAILY
Status: DISCONTINUED | OUTPATIENT
Start: 2018-04-24 | End: 2018-04-25 | Stop reason: HOSPADM

## 2018-04-23 RX ORDER — AMOXICILLIN 250 MG
1 CAPSULE ORAL 2 TIMES DAILY
Status: DISCONTINUED | OUTPATIENT
Start: 2018-04-23 | End: 2018-04-25 | Stop reason: HOSPADM

## 2018-04-23 RX ORDER — TOPIRAMATE 100 MG/1
200 TABLET, FILM COATED ORAL DAILY
Status: DISCONTINUED | OUTPATIENT
Start: 2018-04-24 | End: 2018-04-25 | Stop reason: HOSPADM

## 2018-04-23 RX ORDER — CYCLOBENZAPRINE HCL 10 MG
10 TABLET ORAL
Status: DISCONTINUED | OUTPATIENT
Start: 2018-04-23 | End: 2018-04-25 | Stop reason: HOSPADM

## 2018-04-23 RX ORDER — ONDANSETRON 2 MG/ML
INJECTION INTRAMUSCULAR; INTRAVENOUS AS NEEDED
Status: DISCONTINUED | OUTPATIENT
Start: 2018-04-23 | End: 2018-04-23 | Stop reason: HOSPADM

## 2018-04-23 RX ORDER — HYDROMORPHONE HYDROCHLORIDE 2 MG/ML
0.5 INJECTION, SOLUTION INTRAMUSCULAR; INTRAVENOUS; SUBCUTANEOUS
Status: ACTIVE | OUTPATIENT
Start: 2018-04-23 | End: 2018-04-24

## 2018-04-23 RX ORDER — OXYCODONE HYDROCHLORIDE 5 MG/1
10 TABLET ORAL
Status: DISCONTINUED | OUTPATIENT
Start: 2018-04-23 | End: 2018-04-25 | Stop reason: HOSPADM

## 2018-04-23 RX ORDER — CYCLOBENZAPRINE HCL 10 MG
10 TABLET ORAL
Qty: 60 TAB | Refills: 2 | Status: SHIPPED | OUTPATIENT
Start: 2018-04-23 | End: 2018-08-31 | Stop reason: ALTCHOICE

## 2018-04-23 RX ORDER — HYDROMORPHONE HCL IN 0.9% NACL 15 MG/30ML
PATIENT CONTROLLED ANALGESIA VIAL INTRAVENOUS
Status: DISCONTINUED | OUTPATIENT
Start: 2018-04-23 | End: 2018-04-24

## 2018-04-23 RX ORDER — CEFAZOLIN SODIUM/WATER 2 G/20 ML
2 SYRINGE (ML) INTRAVENOUS
Status: COMPLETED | OUTPATIENT
Start: 2018-04-23 | End: 2018-04-23

## 2018-04-23 RX ORDER — LIDOCAINE HYDROCHLORIDE 20 MG/ML
INJECTION, SOLUTION EPIDURAL; INFILTRATION; INTRACAUDAL; PERINEURAL AS NEEDED
Status: DISCONTINUED | OUTPATIENT
Start: 2018-04-23 | End: 2018-04-23 | Stop reason: HOSPADM

## 2018-04-23 RX ORDER — SODIUM CHLORIDE, SODIUM LACTATE, POTASSIUM CHLORIDE, CALCIUM CHLORIDE 600; 310; 30; 20 MG/100ML; MG/100ML; MG/100ML; MG/100ML
125 INJECTION, SOLUTION INTRAVENOUS CONTINUOUS
Status: DISCONTINUED | OUTPATIENT
Start: 2018-04-23 | End: 2018-04-23 | Stop reason: HOSPADM

## 2018-04-23 RX ORDER — TOPIRAMATE 200 MG/1
300 TABLET ORAL EVERY EVENING
COMMUNITY
End: 2018-05-08 | Stop reason: SDUPTHER

## 2018-04-23 RX ORDER — ACETAMINOPHEN 325 MG/1
650 TABLET ORAL
Status: DISCONTINUED | OUTPATIENT
Start: 2018-04-23 | End: 2018-04-25 | Stop reason: HOSPADM

## 2018-04-23 RX ORDER — DOCUSATE SODIUM 100 MG/1
100 CAPSULE, LIQUID FILLED ORAL 2 TIMES DAILY
Qty: 60 CAP | Refills: 2 | Status: SHIPPED | OUTPATIENT
Start: 2018-04-23 | End: 2018-08-31 | Stop reason: ALTCHOICE

## 2018-04-23 RX ORDER — CEFAZOLIN SODIUM/WATER 2 G/20 ML
2 SYRINGE (ML) INTRAVENOUS EVERY 8 HOURS
Status: COMPLETED | OUTPATIENT
Start: 2018-04-23 | End: 2018-04-24

## 2018-04-23 RX ORDER — OXYCODONE HYDROCHLORIDE 5 MG/1
TABLET ORAL
Qty: 90 TAB | Refills: 0 | Status: SHIPPED | OUTPATIENT
Start: 2018-04-23 | End: 2018-08-31 | Stop reason: ALTCHOICE

## 2018-04-23 RX ORDER — TOPIRAMATE 100 MG/1
300 TABLET, FILM COATED ORAL EVERY EVENING
Status: DISCONTINUED | OUTPATIENT
Start: 2018-04-23 | End: 2018-04-25 | Stop reason: HOSPADM

## 2018-04-23 RX ORDER — LANOLIN ALCOHOL/MO/W.PET/CERES
2 CREAM (GRAM) TOPICAL
Status: DISCONTINUED | OUTPATIENT
Start: 2018-04-24 | End: 2018-04-25 | Stop reason: HOSPADM

## 2018-04-23 RX ORDER — MIDAZOLAM HYDROCHLORIDE 1 MG/ML
INJECTION, SOLUTION INTRAMUSCULAR; INTRAVENOUS AS NEEDED
Status: DISCONTINUED | OUTPATIENT
Start: 2018-04-23 | End: 2018-04-23 | Stop reason: HOSPADM

## 2018-04-23 RX ORDER — FENTANYL CITRATE 50 UG/ML
INJECTION, SOLUTION INTRAMUSCULAR; INTRAVENOUS AS NEEDED
Status: DISCONTINUED | OUTPATIENT
Start: 2018-04-23 | End: 2018-04-23 | Stop reason: HOSPADM

## 2018-04-23 RX ORDER — SODIUM CHLORIDE 0.9 % (FLUSH) 0.9 %
5-10 SYRINGE (ML) INJECTION EVERY 8 HOURS
Status: DISCONTINUED | OUTPATIENT
Start: 2018-04-24 | End: 2018-04-25 | Stop reason: HOSPADM

## 2018-04-23 RX ORDER — ONDANSETRON 2 MG/ML
4 INJECTION INTRAMUSCULAR; INTRAVENOUS
Status: DISCONTINUED | OUTPATIENT
Start: 2018-04-23 | End: 2018-04-25 | Stop reason: HOSPADM

## 2018-04-23 RX ADMIN — Medication 2 G: at 08:50

## 2018-04-23 RX ADMIN — FENTANYL CITRATE 50 MCG: 50 INJECTION, SOLUTION INTRAMUSCULAR; INTRAVENOUS at 08:28

## 2018-04-23 RX ADMIN — MIDAZOLAM HYDROCHLORIDE 3 MG: 1 INJECTION, SOLUTION INTRAMUSCULAR; INTRAVENOUS at 08:28

## 2018-04-23 RX ADMIN — DEXAMETHASONE SODIUM PHOSPHATE 8 MG: 4 INJECTION, SOLUTION INTRA-ARTICULAR; INTRALESIONAL; INTRAMUSCULAR; INTRAVENOUS; SOFT TISSUE at 08:52

## 2018-04-23 RX ADMIN — TOPIRAMATE 300 MG: 100 TABLET, FILM COATED ORAL at 21:57

## 2018-04-23 RX ADMIN — LIDOCAINE HYDROCHLORIDE 80 MG: 20 INJECTION, SOLUTION EPIDURAL; INFILTRATION; INTRACAUDAL; PERINEURAL at 08:33

## 2018-04-23 RX ADMIN — SODIUM CHLORIDE, SODIUM LACTATE, POTASSIUM CHLORIDE, CALCIUM CHLORIDE: 600; 310; 30; 20 INJECTION, SOLUTION INTRAVENOUS at 10:33

## 2018-04-23 RX ADMIN — HYDROMORPHONE HYDROCHLORIDE 1 MG: 2 INJECTION, SOLUTION INTRAMUSCULAR; INTRAVENOUS; SUBCUTANEOUS at 09:57

## 2018-04-23 RX ADMIN — Medication 2 G: at 15:48

## 2018-04-23 RX ADMIN — NEOSTIGMINE METHYLSULFATE 3 MG: 1 INJECTION INTRAVENOUS at 11:01

## 2018-04-23 RX ADMIN — SODIUM CHLORIDE, SODIUM LACTATE, POTASSIUM CHLORIDE, CALCIUM CHLORIDE: 600; 310; 30; 20 INJECTION, SOLUTION INTRAVENOUS at 08:39

## 2018-04-23 RX ADMIN — FENTANYL CITRATE 200 MCG: 50 INJECTION, SOLUTION INTRAMUSCULAR; INTRAVENOUS at 08:33

## 2018-04-23 RX ADMIN — ELETRIPTAN HYDROBROMIDE 40 MG: 40 TABLET, FILM COATED ORAL at 17:30

## 2018-04-23 RX ADMIN — ROCURONIUM BROMIDE 30 MG: 10 INJECTION, SOLUTION INTRAVENOUS at 09:17

## 2018-04-23 RX ADMIN — SODIUM CHLORIDE 125 ML/HR: 900 INJECTION, SOLUTION INTRAVENOUS at 23:55

## 2018-04-23 RX ADMIN — ONDANSETRON 4 MG: 2 INJECTION INTRAMUSCULAR; INTRAVENOUS at 23:59

## 2018-04-23 RX ADMIN — ONDANSETRON 4 MG: 2 INJECTION INTRAMUSCULAR; INTRAVENOUS at 10:51

## 2018-04-23 RX ADMIN — Medication: at 11:56

## 2018-04-23 RX ADMIN — ROCURONIUM BROMIDE 20 MG: 10 INJECTION, SOLUTION INTRAVENOUS at 10:38

## 2018-04-23 RX ADMIN — SODIUM CHLORIDE, SODIUM LACTATE, POTASSIUM CHLORIDE, AND CALCIUM CHLORIDE: 600; 310; 30; 20 INJECTION, SOLUTION INTRAVENOUS at 07:38

## 2018-04-23 RX ADMIN — PROPOFOL 150 MG: 10 INJECTION, EMULSION INTRAVENOUS at 08:33

## 2018-04-23 RX ADMIN — HYDROMORPHONE HYDROCHLORIDE 1 MG: 2 INJECTION, SOLUTION INTRAMUSCULAR; INTRAVENOUS; SUBCUTANEOUS at 09:06

## 2018-04-23 RX ADMIN — SODIUM CHLORIDE, SODIUM LACTATE, POTASSIUM CHLORIDE, AND CALCIUM CHLORIDE 100 ML/HR: 600; 310; 30; 20 INJECTION, SOLUTION INTRAVENOUS at 07:43

## 2018-04-23 RX ADMIN — ROCURONIUM BROMIDE 40 MG: 10 INJECTION, SOLUTION INTRAVENOUS at 08:55

## 2018-04-23 RX ADMIN — HYDROMORPHONE HYDROCHLORIDE 1 MG: 2 INJECTION, SOLUTION INTRAMUSCULAR; INTRAVENOUS; SUBCUTANEOUS at 10:37

## 2018-04-23 RX ADMIN — GLYCOPYRROLATE 0.4 MG: 0.2 INJECTION INTRAMUSCULAR; INTRAVENOUS at 11:01

## 2018-04-23 RX ADMIN — MIDAZOLAM HYDROCHLORIDE 2 MG: 1 INJECTION, SOLUTION INTRAMUSCULAR; INTRAVENOUS at 08:33

## 2018-04-23 RX ADMIN — SODIUM CHLORIDE 125 ML/HR: 900 INJECTION, SOLUTION INTRAVENOUS at 11:54

## 2018-04-23 RX ADMIN — PROMETHAZINE HYDROCHLORIDE 6.25 MG: 25 INJECTION INTRAMUSCULAR; INTRAVENOUS at 13:43

## 2018-04-23 RX ADMIN — HYDROMORPHONE HYDROCHLORIDE 0.5 MG: 2 INJECTION, SOLUTION INTRAMUSCULAR; INTRAVENOUS; SUBCUTANEOUS at 10:29

## 2018-04-23 RX ADMIN — ROCURONIUM BROMIDE 10 MG: 10 INJECTION, SOLUTION INTRAVENOUS at 08:33

## 2018-04-23 RX ADMIN — ROCURONIUM BROMIDE 20 MG: 10 INJECTION, SOLUTION INTRAVENOUS at 09:59

## 2018-04-23 RX ADMIN — ONDANSETRON 4 MG: 2 INJECTION INTRAMUSCULAR; INTRAVENOUS at 15:48

## 2018-04-23 RX ADMIN — SUCCINYLCHOLINE CHLORIDE 100 MG: 20 INJECTION INTRAMUSCULAR; INTRAVENOUS at 08:33

## 2018-04-23 NOTE — ANESTHESIA PREPROCEDURE EVALUATION
Anesthetic History   No history of anesthetic complications            Review of Systems / Medical History  Patient summary reviewed, nursing notes reviewed and pertinent labs reviewed    Pulmonary  Within defined limits                 Neuro/Psych   Within defined limits           Cardiovascular  Within defined limits                     GI/Hepatic/Renal  Within defined limits              Endo/Other        Morbid obesity     Other Findings              Physical Exam    Airway  Mallampati: II  TM Distance: 4 - 6 cm  Neck ROM: normal range of motion   Mouth opening: Normal     Cardiovascular    Rhythm: regular  Rate: normal         Dental  No notable dental hx       Pulmonary  Breath sounds clear to auscultation               Abdominal         Other Findings            Anesthetic Plan    ASA: 2  Anesthesia type: general            Anesthetic plan and risks discussed with: Patient

## 2018-04-23 NOTE — PROGRESS NOTES
4/23/2018 6:59 PM Case management consult for home health received. Met with pt and pt's . Reason for Admission:  Lumbar spondylolisthesis                    RRAT Score:  5                   Plan for utilizing home health:  yes                        Likelihood of Readmission:  Low/green                         Transition of Care Plan:  Home health and family support    Pt has a rw and cane at home. Pt has rx coverage and fills her scripts at the [facility] at [address] [place] [address] Avenue. Pt's  will transport pt home. Choice given for home health, At [address] selected, referral sent. CM will follow. BUD Gloria                  Care Management Interventions  PCP Verified by CM: Yes Mary Lou Pain- nurse navigator notified )  Mode of Transport at Discharge:  Other (see comment) (Pt's )  Transition of Care Consult (CM Consult): [address]: No  Reason Outside [place]: Physician referred to specific agency  MyChart Signup: No  Discharge Durable Medical Equipment: No  Physical Therapy Consult: Yes  Occupational Therapy Consult: Yes  Speech Therapy Consult: No  Current Support Network: Own Home, Lives with Spouse  Confirm Follow Up Transport: Family

## 2018-04-23 NOTE — PROGRESS NOTES
Received patient from PACU. Patient alert and verbal, answering questions appropriately. Patient with complaints of nausea. Patient oriented to room and call bell. Patient family at bedside.  Primary Nurse Analia Alexander RN and Isha Ryan RN performed a dual skin assessment on this patient Impairment noted- see wound doc flow sheet  Audi score is 19

## 2018-04-23 NOTE — PROGRESS NOTES
Bedside and Verbal shift change report given to  Jeannette Zamora (oncoming nurse) by Natan Caruso RN (offgoing nurse). Report included the following information SBAR, ED Summary, OR Summary, Procedure Summary, Intake/Output, MAR and Recent Results.

## 2018-04-23 NOTE — H&P
Date of Surgery Update:  Bob Gao was seen and examined. History and physical has been reviewed. The patient has been examined.  There have been no significant clinical changes since the completion of the originally dated History and Physical.    Signed By: Nik Renee MD     April 23, 2018 7:30 AM

## 2018-04-23 NOTE — OP NOTES
[address]  [address] [facility] [place], [address] [address]    OPERATIVE REPORT      NAME: Adam Bunch    AGE: 56 y.o. YOB: 1970    MEDICAL RECORD NUMBER: [id number]    DATE OF SURGERY: 4/23/2018    PREOPERATIVE DIAGNOSIS: Lumbar spondylolisthesis    POSTOPERATIVE DIAGNOSIS: Lumbar spondylolisthesis     OPERATIVE PROCEDURE:  1. L4 through S1 anterior lumbar interbody fusion   2. Placement of interbody biomechanical device at L4-L5 and L5-S1  3. Anterior instrumentation for spine surgery from L4 to S1  4. Morselized allograft for spine surgery with bone morphogenetic protein    SURGEON: Cassia Suarez MD     CO-SURGEON: Bridgette Blancas MD    ASSISTANT: ROBERT Mix    ANESTHESIA: General    COMPLICATIONS: None    INSTRUMENTATION: LDR    ESTIMATED BLOOD LOSS: 150    INDICATION FOR PROCEDURE: The patient is a very pleasant 56 y.o. female with debilitating back pain and thigh pain. She failed all forms of conservative measures. She elected to proceed with operative intervention. She is aware of the risks, benefits, and alternatives. She provided informed consent. PROCEDURE IN DETAIL: The patient was identified in the preoperative holding area. Her anterior abdomen was marked by me. She was transferred to the operating room where general anesthesia was given. She was also given perioperative antibiotics. The patient was then placed supine on the operating room table. All bony prominences were well padded. We prepped and draped the anterior abdomen in standard fashion. We performed a surgical timeout. The exposure to the anterior lumbar spine was performed by the co-surgeon. This exposure is described in a separate operative report by the co-surgeon. After adequate exposure, I performed a standard diskectomy at L5-S1. The end plates were prepared to bleeding bone. I performed trial sizing.  I placed the appropriate biomechanical interbody device into L5-S1 with the appropriate amount of tension and alignment. I then placed anterior instrumentation into L5 and S1. I placed blades into L5 and into S1. The blades were locked to the implant according to the manufacturers specification. I performed an identical procedure at L4-L5 with identical preparation of end plates and trial sizing. I placed the same biomechanical device into L4-L5. I placed anterior instrumentation with blades into L4 and L5. The biomechanical devices in L4-L5 and L5-S1 were packed with morcellized allograft for spine surgery and bone morphogenetic protein. I copiously irrigated the entire wound. We had good hemostasis. The closure was performed by the co-surgeon. A sterile dressing was applied. The patient was extubated and transferred to the recovery room in good medical condition. I, Dr. Mode Galvan, performed the above procedures.      Mode Galvan MD  4/23/2018

## 2018-04-23 NOTE — ROUTINE PROCESS
TRANSFER - OUT REPORT:    Verbal report given to Joyce Pickens on [address]  being transferred to [phone]   for routine post - op       Report consisted of patients Situation, Background, Assessment and   Recommendations(SBAR). Information from the following report(s) SBAR, OR Summary, Intake/Output and MAR was reviewed with the receiving nurse. Lines:   Peripheral IV 04/23/18 Right Wrist (Active)   Site Assessment Clean, dry, & intact 4/23/2018  2:24 PM   Phlebitis Assessment 0 4/23/2018  2:24 PM   Infiltration Assessment 0 4/23/2018  2:24 PM   Dressing Status Clean, dry, & intact 4/23/2018  2:24 PM   Dressing Type Transparent;Tape 4/23/2018  2:24 PM   Hub Color/Line Status Patent; Infusing;Pink 4/23/2018  2:24 PM   Action Taken Open ports on tubing capped 4/23/2018  7:43 AM   Alcohol Cap Used Yes 4/23/2018  2:24 PM       Peripheral IV 04/23/18 Left Wrist (Active)   Site Assessment Clean, dry, & intact 4/23/2018  2:24 PM   Phlebitis Assessment 0 4/23/2018  2:24 PM   Infiltration Assessment 0 4/23/2018  2:24 PM   Dressing Status Clean, dry, & intact 4/23/2018  2:24 PM   Dressing Type Transparent;Tape 4/23/2018  2:24 PM   Hub Color/Line Status Patent;Capped;Pink 4/23/2018  2:24 PM   Alcohol Cap Used Yes 4/23/2018  2:24 PM        Opportunity for questions and clarification was provided.       Patient transported with:   O2 @ 2 liters  Registered Nurse

## 2018-04-23 NOTE — ANESTHESIA POSTPROCEDURE EVALUATION
Post-Anesthesia Evaluation and Assessment    Patient: Mery Gandhi MRN: [id number]  SSN: [id number]    YOB: 1970  Age: 56 y.o. Sex: female       Cardiovascular Function/Vital Signs  Visit Vitals    /58    Pulse 93    Temp 36.7 °C (98.1 °F)    Resp 14    SpO2 97%       Patient is status post general anesthesia for Procedure(s):  L4-L5, L5-S1 ANTERIOR LUMBAR INTERBODY FUSION WITH INSTRUMENTATION. Nausea/Vomiting: None    Postoperative hydration reviewed and adequate. Pain:  Pain Scale 1: Numeric (0 - 10) (04/23/18 1254)  Pain Intensity 1: 0 (04/23/18 1254)   Managed    Neurological Status:   Neuro (WDL): Exceptions to WDL (04/23/18 1149)  Neuro  Neurologic State: Drowsy (04/23/18 1159)  LUE Motor Response: Purposeful (04/23/18 1159)  LLE Motor Response: Purposeful (04/23/18 1159)  RUE Motor Response: Purposeful (04/23/18 1159)  RLE Motor Response: Purposeful (04/23/18 1159)   At baseline    Mental Status and Level of Consciousness: Arousable    Pulmonary Status:   O2 Device: Nasal cannula (04/23/18 1232)   Adequate oxygenation and airway patent    Complications related to anesthesia: None    Post-anesthesia assessment completed.  No concerns    Signed By: Dariela Knutson MD     April 23, 2018

## 2018-04-23 NOTE — IP AVS SNAPSHOT
Maxim Jarrett 
 
 
 [address] [place] [address] [address] 
[phone] Patient: Bhargav Mclean MRN: [id number] TQO:[birth year] A check erick indicates which time of day the medication should be taken. My Medications START taking these medications Instructions Each Dose to Equal  
 Morning Noon Evening Bedtime  
 cyclobenzaprine 10 mg tablet Commonly known as:  FLEXERIL Your last dose was:  11:15 Take 1 Tab by mouth three (3) times daily as needed for Muscle Spasm(s). 10 mg  
    
   
   
   
  
 docusate sodium 100 mg capsule Commonly known as:  Grey Guppy Your last dose was:  Given in AM  
   
 Take 1 Cap by mouth two (2) times a day. 100 mg  
    
   
   
  
   
  
 oxyCODONE IR 5 mg immediate release tablet Commonly known as:  Kt Cherise Your last dose was:  11:15 Take 1-2 tablets PO every 4 to 6 hours prn post-operative pain  
     
   
   
   
  
 promethazine 25 mg tablet Commonly known as:  PHENERGAN Take 1 Tab by mouth every six (6) hours as needed for Nausea. 25 mg CONTINUE taking these medications Instructions Each Dose to Equal  
 Morning Noon Evening Bedtime Iron 325 mg (65 mg iron) tablet Generic drug:  ferrous sulfate Your last dose was:  07:25 AM  
   
 Take  by mouth Daily (before breakfast). RELPAX 40 mg tablet Generic drug:  eletriptan Your last dose was:  4/23/18 5:30 PM  
   
 TAKE 1 TABLET BY MOUTH ONCE AS NEEDED. MAY REPEAT IN 2 HOURS IF NEEDED * topiramate 200 mg tablet Commonly known as:  TOPAMAX Your last dose was:  08:18 AM  
   
 Take 200 mg by mouth daily. 200 mg  
    
   
   
   
  
 * topiramate 200 mg tablet Commonly known as:  TOPAMAX Your last dose was:  08:17 Take 300 mg by mouth every evening.   
 300 mg  
    
   
   
   
  
 * Notice: This list has 2 medication(s) that are the same as other medications prescribed for you. Read the directions carefully, and ask your doctor or other care provider to review them with you. STOP taking these medications ADVIL 200 mg tablet Generic drug:  ibuprofen LORTAB 5-500 mg per tablet Generic drug:  HYDROcodone-acetaminophen  
   
  
 multivitamin tablet Commonly known as:  ONE A DAY Where to Get Your Medications Information on where to get these meds will be given to you by the nurse or doctor. ! Ask your nurse or doctor about these medications  
  cyclobenzaprine 10 mg tablet  
 docusate sodium 100 mg capsule  
 oxyCODONE IR 5 mg immediate release tablet  
 promethazine 25 mg tablet

## 2018-04-23 NOTE — IP AVS SNAPSHOT
Charis Severino 
 
 
 [address] 1007 [facility] 
[phone] Patient: Walker Haas MRN: [id number] BTX:[id number] About your hospitalization You were admitted on:  April 23, 2018 You last received care in the:  SF 4M POST SURG ORT 2 You were discharged on:  April 25, 2018 Why you were hospitalized Your primary diagnosis was:  Not on File Your diagnoses also included:  Spondylolisthesis, Lumbar Region Follow-up Information Follow up With Details Comments Contact Info Please return patient own medication (Relpax) located PYXIS patient specific bin to patient at discharge. ROBERT Edmond In 3 weeks As previously scheduled 320 [facility] 103 1007 [facility] 
[phone] [address] [place] [address] 
[phone] Herminia Sanchez MD   [place] [address] [address] [facility] [address] 
[phone] Your Scheduled Appointments Tuesday May 08, 2018  8:40 AM EDT Follow Up with Meron Pratt MD  
[facility]) [place] [address] [facility] [address] [facility] 99 [id number] [phone] Discharge Orders None A check erick indicates which time of day the medication should be taken. My Medications START taking these medications Instructions Each Dose to Equal  
 Morning Noon Evening Bedtime  
 cyclobenzaprine 10 mg tablet Commonly known as:  FLEXERIL Your last dose was:  11:15 Take 1 Tab by mouth three (3) times daily as needed for Muscle Spasm(s). 10 mg  
    
   
   
   
  
 docusate sodium 100 mg capsule Commonly known as:  Alexis Dolin Your last dose was:  Given in AM  
   
 Take 1 Cap by mouth two (2) times a day. 100 mg  
    
   
   
  
   
  
 oxyCODONE IR 5 mg immediate release tablet Commonly known as:  Shahbaz Jj Your last dose was:  11:15 Take 1-2 tablets PO every 4 to 6 hours prn post-operative pain  
     
   
   
   
  
 promethazine 25 mg tablet Commonly known as:  PHENERGAN Take 1 Tab by mouth every six (6) hours as needed for Nausea. 25 mg CONTINUE taking these medications Instructions Each Dose to Equal  
 Morning Noon Evening Bedtime Iron 325 mg (65 mg iron) tablet Generic drug:  ferrous sulfate Your last dose was:  07:25 AM  
   
 Take  by mouth Daily (before breakfast). RELPAX 40 mg tablet Generic drug:  eletriptan Your last dose was:  4/23/18 5:30 PM  
   
 TAKE 1 TABLET BY MOUTH ONCE AS NEEDED. MAY REPEAT IN 2 HOURS IF NEEDED * topiramate 200 mg tablet Commonly known as:  TOPAMAX Your last dose was:  08:18 AM  
   
 Take 200 mg by mouth daily. 200 mg  
    
   
   
   
  
 * topiramate 200 mg tablet Commonly known as:  TOPAMAX Your last dose was:  08:17 Take 300 mg by mouth every evening. 300 mg * Notice: This list has 2 medication(s) that are the same as other medications prescribed for you. Read the directions carefully, and ask your doctor or other care provider to review them with you. STOP taking these medications ADVIL 200 mg tablet Generic drug:  ibuprofen LORTAB 5-500 mg per tablet Generic drug:  HYDROcodone-acetaminophen  
   
  
 multivitamin tablet Commonly known as:  ONE A DAY Where to Get Your Medications Information on where to get these meds will be given to you by the nurse or doctor. ! Ask your nurse or doctor about these medications  
  cyclobenzaprine 10 mg tablet  
 docusate sodium 100 mg capsule  
 oxyCODONE IR 5 mg immediate release tablet  
 promethazine 25 mg tablet Opioid Education Prescription Opioids: What You Need to Know: Prescription opioids can be used to help relieve moderate-to-severe pain and are often prescribed following a surgery or injury, or for certain health conditions. These medications can be an important part of treatment but also come with serious risks. Opioids are strong pain medicines. Examples include hydrocodone, oxycodone, fentanyl, and morphine. Heroin is an example of an illegal opioid. It is important to work with your health care provider to make sure you are getting the safest, most effective care. WHAT ARE THE RISKS AND SIDE EFFECTS OF OPIOID USE? Prescription opioids carry serious risks of addiction and overdose, especially with prolonged use. An opioid overdose, often marked by slow breathing, can cause sudden death. The use of prescription opioids can have a number of side effects as well, even when taken as directed. · Tolerance-meaning you might need to take more of a medication for the same pain relief · Physical dependence-meaning you have symptoms of withdrawal when the medication is stopped. Withdrawal symptoms can include nausea, sweating, chills, diarrhea, stomach cramps, and muscle aches. Withdrawal can last up to several weeks, depending on which drug you took and how long you took it. · Increased sensitivity to pain · Constipation · Nausea, vomiting, and dry mouth · Sleepiness and dizziness · Confusion · Depression · Low levels of testosterone that can result in lower sex drive, energy, and strength · Itching and sweating RISKS ARE GREATER WITH:      
· History of drug misuse, substance use disorder, or overdose · Mental health conditions (such as depression or anxiety) · Sleep apnea · Older age (72 years or older) · Pregnancy Avoid alcohol while taking prescription opioids. Also, unless specifically advised by your health care provider, medications to avoid include: · Benzodiazepines (such as Xanax or Valium) · Muscle relaxants (such as Soma or Flexeril) · Hypnotics (such as Ambien or Lunesta) · Other prescription opioids KNOW YOUR OPTIONS Talk to your health care provider about ways to manage your pain that don't involve prescription opioids. Some of these options may actually work better and have fewer risks and side effects. Options may include: 
· Pain relievers such as acetaminophen, ibuprofen, and naproxen · Some medications that are also used for depression or seizures · Physical therapy and exercise · Counseling to help patients learn how to cope better with triggers of pain and stress. · Application of heat or cold compress · Massage therapy · Relaxation techniques Be Informed Make sure you know the name of your medication, how much and how often to take it, and its potential risks & side effects. IF YOU ARE PRESCRIBED OPIOIDS FOR PAIN: 
· Never take opioids in greater amounts or more often than prescribed. Remember the goal is not to be pain-free but to manage your pain at a tolerable level. · Follow up with your primary care provider to: · Work together to create a plan on how to manage your pain. · Talk about ways to help manage your pain that don't involve prescription opioids. · Talk about any and all concerns and side effects. · Help prevent misuse and abuse. · Never sell or share prescription opioids · Help prevent misuse and abuse. · Store prescription opioids in a secure place and out of reach of others (this may include visitors, children, friends, and family). · Safely dispose of unused/unwanted prescription opioids: Find your community drug take-back program or your pharmacy mail-back program, or flush them down the toilet, following guidance from the Food and Drug Administration (www.fda.gov/Drugs/ResourcesForYou). · Visit www.cdc.gov/drugoverdose to learn about the risks of opioid abuse and overdose.  
· If you believe you may be struggling with addiction, tell your health care provider and ask for guidance or call [address] at 5-861-903-NWCB. Discharge Instructions Lumbar Spinal Surgery Discharge Instructions Dr. Lanette Lockhart [facility] [phone] Activity:   
? You are going home a well person, be as active as possible. Your only exercise should be walking. Start with short frequent walks and increase your walking distance each day. Start with walking twice a day for 5 minutes and increase your distance each day 2-3 minutes until you reach 25 minutes twice a day. Limit the amount of time you sit to 20-30 minute intervals. Sitting for prolonged periods of time will be uncomfortable for you following your surgery. ? No bending, lifting (of 5lbs or more), twisting, or straining. ? Do not drive until your doctor states you may do so. However, you may ride in a car for short distances. ? If you are required to wear a brace, you should wear it at all times when you are out of bed. You may remove it when sleeping unless your physician advises you against it. ? When you are in the bed, you may lay on your back or on either side. Do not lie on your stomach. ? You may resume sexual relations 3-4 weeks after surgery depending on how you are feeling. ? Continue to use your incentive spirometer for deep breathing exercises. Driving: ? You may not drive or return to work until instructed by your physician. However, you may ride in the car for short periods of time. Brace: ? If you have a back brace, you should wear your brace at all times when you are out of bed. Do not wear the brace while in bed or showering. ? Remember to always wear a cotton t-shirt underneath your brace. ? Do not bend or twist when your brace is off. Diet: 
? You may resume your regular home diet as tolerated.   If your throat is sore, you should eat soft foods for the first couple of days. ? Be sure to drink plenty of fluids; it is important to keep yourself hydrated. ? Avoid alcoholic beverages and ABSOLUTELY NO tobacco products. Tobacco products will interfere with your healing. If you continue to use tobacco, you may end up needing another surgery in the future. Dressing: You have on a Prineo dressing. This is a waterproof bacteriostatic dressing that requires no post-operative care. Please do not peel the dressing off, or apply any oil based products, as they may expedite the deterioration of the mesh. The dressing will slowly chip off on its own. 
? Do not rub or apply lotion or ointments to incision site. Shower: ? You may shower 5 days after your surgery. ? You may remove your brace during showers. ? NO not use tub baths, swimming pools or Jacuzzis. Medications: 
? Check with your physician before taking any anti-inflammatory medications. (Advil, Aleve, Ibuprofen, Aspirin) ? Take your pain medication as directed ? Do NOT take additional Tylenol if your prescribed pain medication has acetaminophen in it (Endocet/Percocet, Lortab, Norco) ? It is important to have regular bowel movements. Pain medications can be constipating. Stool softeners, warm prune juice, increasing your water intake, and increasing fiber in your diet can help in preventing constipation. ? Do NOT take laxatives if at all possible except in severe situations. It can result in a vicious cycle of constipation and diarrhea. Stockings: ? You have been given T.E.D. stockings to wear. Continue to wear these for 7 days after your discharge. Put them on in the morning and take them off at night. They are used to increase your circulation and prevent blood clots from forming in your legs. Follow-Up ? Please call [facility] to schedule your 1st post-operative appointment. This should be approximately 3 weeks from your surgery date. Notify your physician in you develop any of the following conditions: 
? Fever above 101 degrees for 24 hours. ? Nausea or vomiting. ? Severe headache. 
? Inability to urinate ? Loss of bowel or bladder function (sudden onset of incontinence) ? Changes in sensation in your extremities (numbness, tingling, loss of color). ? Severe pain or pain not relieved by medications. ? Redness, swelling, or drainage from your incision. ? Persistent pain in the chest.  
? Pain in the calf of either leg. 
? Increased weakness (if this is greater than before your surgery). If you have any questions about your dressing contact your Orthopaedic Surgeons office. OFFICE OF DR. Jennifer Wilson   [phone] [place] [address] [facility], , [address] [facility], [address] [facility] YOU CAN RE-START TAKING YOUR DAILY MULTI-VITAMIN WHEN YOU ARE 1 WEEK OUT FROM SURGERY 
 
STOP TAKING ANY PREVIOUSLY PRESCRIBED PAIN MEDICATION. YOU WERE GIVEN A NEW PRESCRIPTION FOR PAIN MEDICATION UPON DISCHARGE FROM THE HOSPITAL TODAY 
 
** IMPORTANT: UNDER YOUR HONEYCOMB DRESSING, YOU HAVE A PRINEO ADHESIVE MESH DIRECTLY OVER YOUR INCISION. THIS MESH WAS STERILELY GLUED TO YOUR SKIN DURING SURGERY. DO NOT REMOVE THIS. NO ONE ELSE SHOULD REMOVE IT EITHER. IT WILL COME OFF ON ITS OWN OVER TIME 
 
YOUR HONEYCOMB DRESSING NEEDS TO BE REMOVED PRIOR TO SHOWERING. THEN THE PRINEO MESH CAN BE LEFT OPEN TO AIR * WEAR YOUR BRACE AS ADVISED * NO DRIVING UNTIL YOU ARE CLEARED TO DO SO BY YOUR SURGEON 
 
* LIMIT LIFTING, BENDING AND TWISTING. NO LIFTING MORE THAN 5 LBS * MAKE SURE YOU ARE GETTING GOOD NUTRITION (Lean Protein, Vitamin D AND Calcium) * DO NOT TAKE ANY NSAIDs FOR THE FIRST 3 MONTHS AFTER SURGERY (such as Advil/Ibuprofen/Motrin, Aleve/Naproxen/Naprosyn, Diclofenac, Celebrex, Meloxicam, Indomethacin, Goody's powder, BC powder etc.) * NO NICOTINE PRODUCTS * FULLY READ YOUR DISCHARGE INSTRUCTIONS Introducing [facility]! Dear Zuri Edgar: 
Thank you for requesting a [employer] account. Our records indicate that you already have an active NitroSecurity account. You can access your account anytime at [email or web address] Did you know that you can access your hospital and ER discharge instructions at any time in NitroSecurity? You can also review all of your test results from your hospital stay or ER visit. Additional Information If you have questions, please visit the Frequently Asked Questions section of the NitroSecurity website at [email or web address] Remember, NitroSecurity is NOT to be used for urgent needs. For medical emergencies, dial 911. Now available from your iPhone and Android! Introducing Nicko Elizabeth As a [facility] patient, I wanted to make you aware of our electronic visit tool called Nicko Glenn. [facility] [employer] allows you to connect within minutes with a medical provider 24 hours a day, seven days a week via a mobile device or tablet or logging into a secure website from your computer. You can access Nicko Glenn from anywhere in the [place]. A virtual visit might be right for you when you have a simple condition and feel like you just dont want to get out of bed, or cant get away from work for an appointment, when your regular [facility] provider is not available (evenings, weekends or holidays), or when youre out of town and need minor care. Electronic visits cost only $49 and if the [facility] [employer] provider determines a prescription is needed to treat your condition, one can be electronically transmitted to a nearby pharmacy*. Please take a moment to enroll today if you have not already done so. The enrollment process is free and takes just a few minutes. To enroll, please download the Atonarp/Fivetran ashleigh to your tablet or phone, or visit [email or web address] to enroll on your computer. And, as an [address] [place] [address] patient with a [employer] account, the results of your visits will be scanned into your electronic medical record and your primary care provider will be able to view the scanned results. We urge you to continue to see your regular [facility] provider for your ongoing medical care. And while your primary care provider may not be the one available when you seek a Nicko Hornfin virtual visit, the peace of mind you get from getting a real diagnosis real time can be priceless. For more information on Nicko [employer], view our Frequently Asked Questions (FAQs) at [email or web address]. Sincerely, 
 
Omega Courtney MD 
Chief Medical Officer [employer] *:  certain medications cannot be prescribed via [employer] Providers Seen During Your Hospitalization Provider Specialty Primary office phone Guillermina Wayne MD Orthopedic Surgery [phone] Your Primary Care Physician (PCP) Primary Care Physician Office Phone Office Fax Michael Trejo [phone] You are allergic to the following Allergen Reactions Topamax (Topiramate) Other (comments) Low grade fever Only with brand name ok with generic Recent Documentation Weight Breastfeeding? BMI OB Status Smoking Status 116.5 kg No 36.85 kg/m2 Having regular periods Never Smoker Emergency Contacts Name Discharge Info Relation Home Work Mobile Bird [employer] DISCHARGE CAREGIVER [3] Spouse [3] [phone] Patient Belongings The following personal items are in your possession at time of discharge: 
  Dental Appliances: None  Visual Aid: Glasses, With patient      Home Medications: None   Jewelry: None  Clothing:  (Clothing to PACU)    Other Valuables: None Please provide this summary of care documentation to your next provider. Signatures-by signing, you are acknowledging that this After Visit Summary has been reviewed with you and you have received a copy. Patient Signature:  ____________________________________________________________ Date:  ____________________________________________________________  
  
Tavia Chacko Provider Signature:  ____________________________________________________________ Date:  ____________________________________________________________

## 2018-04-24 ENCOUNTER — HOME HEALTH ADMISSION (OUTPATIENT)
Dept: HOME HEALTH SERVICES | Facility: HOME HEALTH | Age: 48
End: 2018-04-24
Payer: COMMERCIAL

## 2018-04-24 LAB
ANION GAP SERPL CALC-SCNC: 13 MMOL/L (ref 5–15)
BUN SERPL-MCNC: 10 MG/DL (ref 6–20)
BUN/CREAT SERPL: 11 (ref 12–20)
CALCIUM SERPL-MCNC: 8 MG/DL (ref 8.5–10.1)
CHLORIDE SERPL-SCNC: 109 MMOL/L (ref 97–108)
CO2 SERPL-SCNC: 17 MMOL/L (ref 21–32)
CREAT SERPL-MCNC: 0.88 MG/DL (ref 0.55–1.02)
GLUCOSE SERPL-MCNC: 127 MG/DL (ref 65–100)
HGB BLD-MCNC: 11.3 G/DL (ref 11.5–16)
POTASSIUM SERPL-SCNC: 3.8 MMOL/L (ref 3.5–5.1)
SODIUM SERPL-SCNC: 139 MMOL/L (ref 136–145)

## 2018-04-24 PROCEDURE — 97535 SELF CARE MNGMENT TRAINING: CPT

## 2018-04-24 PROCEDURE — 74011250636 HC RX REV CODE- 250/636: Performed by: NURSE PRACTITIONER

## 2018-04-24 PROCEDURE — 36415 COLL VENOUS BLD VENIPUNCTURE: CPT | Performed by: ORTHOPAEDIC SURGERY

## 2018-04-24 PROCEDURE — 51798 US URINE CAPACITY MEASURE: CPT

## 2018-04-24 PROCEDURE — 74011250637 HC RX REV CODE- 250/637: Performed by: NURSE PRACTITIONER

## 2018-04-24 PROCEDURE — 97161 PT EVAL LOW COMPLEX 20 MIN: CPT

## 2018-04-24 PROCEDURE — 80048 BASIC METABOLIC PNL TOTAL CA: CPT | Performed by: ORTHOPAEDIC SURGERY

## 2018-04-24 PROCEDURE — 97165 OT EVAL LOW COMPLEX 30 MIN: CPT

## 2018-04-24 PROCEDURE — 85018 HEMOGLOBIN: CPT | Performed by: ORTHOPAEDIC SURGERY

## 2018-04-24 PROCEDURE — 74011250636 HC RX REV CODE- 250/636: Performed by: ORTHOPAEDIC SURGERY

## 2018-04-24 PROCEDURE — 97116 GAIT TRAINING THERAPY: CPT

## 2018-04-24 PROCEDURE — 65270000029 HC RM PRIVATE

## 2018-04-24 PROCEDURE — 97530 THERAPEUTIC ACTIVITIES: CPT

## 2018-04-24 PROCEDURE — 74011250637 HC RX REV CODE- 250/637: Performed by: ORTHOPAEDIC SURGERY

## 2018-04-24 RX ORDER — PROCHLORPERAZINE EDISYLATE 5 MG/ML
10 INJECTION INTRAMUSCULAR; INTRAVENOUS
Status: COMPLETED | OUTPATIENT
Start: 2018-04-24 | End: 2018-04-24

## 2018-04-24 RX ORDER — PROCHLORPERAZINE EDISYLATE 5 MG/ML
5 INJECTION INTRAMUSCULAR; INTRAVENOUS ONCE
Status: COMPLETED | OUTPATIENT
Start: 2018-04-24 | End: 2018-04-24

## 2018-04-24 RX ORDER — SCOLOPAMINE TRANSDERMAL SYSTEM 1 MG/1
1 PATCH, EXTENDED RELEASE TRANSDERMAL
Status: DISCONTINUED | OUTPATIENT
Start: 2018-04-24 | End: 2018-04-25 | Stop reason: HOSPADM

## 2018-04-24 RX ADMIN — OXYCODONE HYDROCHLORIDE 10 MG: 5 TABLET ORAL at 21:17

## 2018-04-24 RX ADMIN — SODIUM CHLORIDE 125 ML/HR: 900 INJECTION, SOLUTION INTRAVENOUS at 08:48

## 2018-04-24 RX ADMIN — Medication 2 G: at 08:50

## 2018-04-24 RX ADMIN — THERA TABS 1 TABLET: TAB at 08:49

## 2018-04-24 RX ADMIN — PROCHLORPERAZINE EDISYLATE 5 MG: 5 INJECTION INTRAMUSCULAR; INTRAVENOUS at 05:21

## 2018-04-24 RX ADMIN — STANDARDIZED SENNA CONCENTRATE AND DOCUSATE SODIUM 1 TABLET: 8.6; 5 TABLET, FILM COATED ORAL at 08:49

## 2018-04-24 RX ADMIN — Medication 10 ML: at 05:26

## 2018-04-24 RX ADMIN — PROCHLORPERAZINE EDISYLATE 10 MG: 5 INJECTION INTRAMUSCULAR; INTRAVENOUS at 12:27

## 2018-04-24 RX ADMIN — Medication 10 ML: at 14:00

## 2018-04-24 RX ADMIN — Medication: at 07:45

## 2018-04-24 RX ADMIN — Medication 2 G: at 00:00

## 2018-04-24 RX ADMIN — TOPIRAMATE 200 MG: 100 TABLET, FILM COATED ORAL at 08:49

## 2018-04-24 RX ADMIN — OXYCODONE HYDROCHLORIDE 10 MG: 5 TABLET ORAL at 08:57

## 2018-04-24 RX ADMIN — Medication 10 ML: at 22:00

## 2018-04-24 RX ADMIN — OXYCODONE HYDROCHLORIDE 10 MG: 5 TABLET ORAL at 13:43

## 2018-04-24 RX ADMIN — FAMOTIDINE 20 MG: 20 TABLET ORAL at 17:54

## 2018-04-24 RX ADMIN — TOPIRAMATE 300 MG: 100 TABLET, FILM COATED ORAL at 21:17

## 2018-04-24 RX ADMIN — FAMOTIDINE 20 MG: 20 TABLET ORAL at 08:49

## 2018-04-24 RX ADMIN — PROCHLORPERAZINE EDISYLATE 10 MG: 5 INJECTION INTRAMUSCULAR; INTRAVENOUS at 21:22

## 2018-04-24 RX ADMIN — STANDARDIZED SENNA CONCENTRATE AND DOCUSATE SODIUM 1 TABLET: 8.6; 5 TABLET, FILM COATED ORAL at 17:54

## 2018-04-24 RX ADMIN — POLYETHYLENE GLYCOL (3350) 17 G: 17 POWDER, FOR SOLUTION ORAL at 08:49

## 2018-04-24 NOTE — PROGRESS NOTES
Physical therapy note:  Orders received, chart reviewed. Patient cleared by nursing staff for therapy, but does report patient has been nauseous with recent vomiting. Patient received in bed, spouse present in room. Patient declines OOB activity at this time, secondary to increased pain, and nausea. Will follow up with patient at later time for PT evaluation. Thank you.

## 2018-04-24 NOTE — PROGRESS NOTES
4/24/2018 2:00 PM [facility] has accepted pt.     4/24/2018 11:45 AM At [facility] is unable to accept due to insurance. Spoke with pt, choice given for alternative agency, [facility] selected, referral sent.  Linda Ren, BSW

## 2018-04-24 NOTE — PROGRESS NOTES
Occupational therapy note:  Orders received, chart reviewed. Patient cleared by nursing staff for therapy, but does report patient has been nauseous with recent vomiting. Patient received in bed, spouse present in room. Patient declines OOB activity at this time, secondary to increased pain, and nausea. Will follow up with patient at later time for OT evaluation. Mer Myrick MS OTR/L

## 2018-04-24 NOTE — PROGRESS NOTES
Problem: Mobility Impaired (Adult and Pediatric)  Goal: *Acute Goals and Plan of Care (Insert Text)  Physical Therapy Goals  Initiated 4/24/2018    1. Patient will move from supine to sit and sit to supine , scoot up and down and roll side to side in bed with independence within 4 days. 2. Patient will perform sit to stand with independence within 4 days. 3. Patient will ambulate with independence for 200 feet with the least restrictive device within 4 days. 4. Patient will ascend/descend 2 stairs with  handrail(s) with independence within 4 days. 5. Patient will verbalize and demonstrate understanding of spinal precautions (No bending, lifting greater than 5 lbs, or twisting; log-roll technique; frequent repositioning as instructed) within 4 days. physical Therapy EVALUATION  Patient: Estella Frias (26 y.o. female)  Date: 4/24/2018  Primary Diagnosis: Acute left lumbar radiculopathy [M54.16]  Lumbar stenosis with neurogenic claudication [M48.062]  Lumbar adjacent segment disease with spondylolisthesis [M51.36, M43.16]  Procedure(s) (LRB):  L4-L5, L5-S1 ANTERIOR LUMBAR INTERBODY FUSION WITH INSTRUMENTATION (N/A) 1 Day Post-Op   Precautions: Log roll, Back brace when OOB, may be up to bathroom without the back brace. ASSESSMENT :  Based on the objective data described below, the patient presents with difficulty with ambulation. Sit on the edge of bed with mod assist, sit to stand mod assist, ambulate with hand assist in and out of the bathroom. Ambulate around the bed min assist, OOB to chair as tolerated. Reviewed back precautions and donning of back brace with the patient verbalized understanding however educate patient to do log roll when getting out of bed but patient want her  to pull her forward. Notified nurse who agreed to monitor and assist back to bed when ask. Patient will benefit from skilled intervention to address the above impairments.   Patients rehabilitation potential is considered to be Excellent  Factors which may influence rehabilitation potential include:   []         None noted  []         Mental ability/status  []         Medical condition  []         Home/family situation and support systems  []         Safety awareness  [x]         Pain tolerance/management  []         Other:      PLAN :  Recommendations and Planned Interventions:  [x]           Bed Mobility Training             []    Neuromuscular Re-Education  [x]           Transfer Training                   []    Orthotic/Prosthetic Training  [x]           Gait Training                         []    Modalities  [x]           Therapeutic Exercises           []    Edema Management/Control  [x]           Therapeutic Activities            []    Patient and Family Training/Education  []           Other (comment):    Frequency/Duration: Patient will be followed by physical therapy  twice daily to address goals. Discharge Recommendations: Home Health  Further Equipment Recommendations for Discharge: TBD. SUBJECTIVE:   Patient stated my back hurts.     OBJECTIVE DATA SUMMARY:   HISTORY:    Past Medical History:   Diagnosis Date    Anemia     chronic    Chronic pain     Migraine     Obesity (BMI 35.0-39.9 without comorbidity) 2018    Stool color black     Because she takes iron     Past Surgical History:   Procedure Laterality Date    HX BACK SURGERY      HX  SECTION      HX LUMBAR DISKECTOMY      L 5 S1    HX TUBAL LIGATION       Prior Level of Function/Home Situation: Independent community ambulator without assistive device.   Personal factors and/or comorbidities impacting plan of care:     Home Situation  Home Environment: Private residence  # Steps to Enter: 2  One/Two Story Residence: Two story, live on 1st floor  Living Alone: No  Support Systems: Spouse/Significant Other/Partner  Patient Expects to be Discharged to[de-identified] Private residence  Current DME Used/Available at Home: Brace/Splint    EXAMINATION/PRESENTATION/DECISION MAKING:   Critical Behavior:  Neurologic State: Alert           Hearing: Auditory  Auditory Impairment: None    Range Of Motion:  AROM: Within functional limits           PROM: Within functional limits           Strength:    Strength: Generally decreased, functional (due to pain)                    Tone & Sensation:                                  Coordination:  Coordination: Within functional limits  Vision:      Functional Mobility:  Bed Mobility:  Rolling: Moderate assistance; Additional time  Supine to Sit: Moderate assistance; Additional time  Sit to Supine: Moderate assistance; Additional time  Scooting: Moderate assistance; Additional time  Transfers:  Sit to Stand: Moderate assistance; Additional time  Stand to Sit: Moderate assistance; Additional time  Stand Pivot Transfers: Moderate assistance     Bed to Chair: Moderate assistance; Additional time              Balance:   Sitting: Intact  Standing: Impaired;Pull to stand; With support  Standing - Static: Fair  Standing - Dynamic : Fair  Ambulation/Gait Training:  Distance (ft): 30 Feet (ft)     Ambulation - Level of Assistance: Minimal assistance     Gait Description (WDL): Exceptions to WDL  Gait Abnormalities: Antalgic        Base of Support: Widened     Speed/Soco: Slow;Fluctuations              Therapeutic Exercises:    Instructed patient to continue active range of motion exercise on both legs while up on chair or on bed.        Functional Measure:  Tinetti test:    Sitting Balance: 1  Arises: 1  Attempts to Rise: 1  Immediate Standing Balance: 1  Standing Balance: 1  Nudged: 2  Eyes Closed: 1  Turn 360 Degrees - Continuous/Discontinuous: 1  Turn 360 Degrees - Steady/Unsteady: 1  Sitting Down: 1  Balance Score: 11  Indication of Gait: 1  R Step Length/Height: 1  L Step Length/Height: 1  R Foot Clearance: 1  L Foot Clearance: 1  Step Symmetry: 1  Step Continuity: 1  Path: 1  Trunk: 1  Walking Time: 0  Gait Score: 9  Total Score: 20       Tinetti Test and G-code impairment scale:  Percentage of Impairment CH    0%   CI    1-19% CJ    20-39% CK    40-59% CL    60-79% CM    80-99% CN     100%   Tinetti  Score 0-28 28 23-27 17-22 12-16 6-11 1-5 0       Tinetti Tool Score Risk of Falls  <19 = High Fall Risk  19-24 = Moderate Fall Risk  25-28 = Low Fall Risk  Tinetti ME. Performance-Oriented Assessment of Mobility Problems in Elderly Patients. [facility]; [id number]. (Scoring Description: PT Bulletin Feb. 10, 1993)    Older adults: Rubina Ortiz et al, 2009; n = 1000 [facility] elderly evaluated with ABC, BRIJESH, ADL, and IADL)  · Mean BRIJESH score for males aged 69-68 years = 26.21(3.40)  · Mean BRIJESH score for females age 69-68 years = 25.16(4.30)  · Mean BRIJESH score for males over 80 years = 23.29(6.02)  · Mean BRIJESH score for females over 80 years = 17.20(8.32)       G codes: In compliance with CMSs Claims Based Outcome Reporting, the following G-code set was chosen for this patient based on their primary functional limitation being treated: The outcome measure chosen to determine the severity of the functional limitation was the tinetti with a score of 20/28 which was correlated with the impairment scale.     ? Mobility - Walking and Moving Around:     - CURRENT STATUS: CJ - 20%-39% impaired, limited or restricted    - GOAL STATUS: CI - 1%-19% impaired, limited or restricted    - D/C STATUS:  ---------------To be determined---------------      Physical Therapy Evaluation Charge Determination   History Examination Presentation Decision-Making   LOW Complexity : Zero comorbidities / personal factors that will impact the outcome / POC LOW Complexity : 1-2 Standardized tests and measures addressing body structure, function, activity limitation and / or participation in recreation  LOW Complexity : Stable, uncomplicated  Other outcome measures tinetti  MEDIUM      Based on the above components, the patient evaluation is determined to be of the following complexity level: LOW     Pain:  Pain Scale 1: Numeric (0 - 10)  Pain Intensity 1: 1              Activity Tolerance:   Good. Please refer to the flowsheet for vital signs taken during this treatment. After treatment:   [x]         Patient left in no apparent distress sitting up in chair  []         Patient left in no apparent distress in bed  [x]         Call bell left within reach  [x]         Nursing notified  [x]         Caregiver present  []         Bed alarm activated    COMMUNICATION/EDUCATION:   The patients plan of care was discussed with: Registered Nurse and patient. [x]         Fall prevention education was provided and the patient/caregiver indicated understanding. [x]         Patient/family have participated as able in goal setting and plan of care. [x]         Patient/family agree to work toward stated goals and plan of care. []         Patient understands intent and goals of therapy, but is neutral about his/her participation. []         Patient is unable to participate in goal setting and plan of care. Thank you for this referral.  Ferdinand Jones, PT,WCC.    Time Calculation: 30 mins

## 2018-04-24 NOTE — PROGRESS NOTES
ORTHOPAEDIC LUMBAR FUSION PROGRESS NOTE    NAME:     Lauren Mehta   :       1970   MRN:       [id number]   DATE:      2018    POD:    1 Day Post-Op  S/P:    Procedure(s):  L4-L5, L5-S1 ANTERIOR LUMBAR INTERBODY FUSION WITH INSTRUMENTATION    SUBJECTIVE:    C/O pain along surgical incision  No leg pain or numbness  Denies nausea/vomiting, chest pain, headache or shortness of breath  Pain controlled    Recent Labs      18   0349   HGB  11.3*   NA  139   K  3.8   CL  109*   CO2  17*   BUN  10   CREA  0.88   GLU  127*     Patient Vitals for the past 12 hrs:   BP Temp Pulse Resp SpO2   18 0305 106/66 99.6 °F (37.6 °C) 95 16 99 %   18 112/64 99.6 °F (37.6 °C) 98 16 97 %       EXAM:  Dressing clean, dry and intact   Positive strength/ROM bilat lower ext. Neuro intact to sensation  Calves, soft & nontender  BL LEs NVID.  No peripheral edema      PLAN:  D/C PCA, change to PO pain medications  PT/OT, OOB w/ assist  Advance regular diet    Olena Howe [place]  Orthopaedic Surgery  Physician Assistant to Dr. Le Vale

## 2018-04-24 NOTE — PROGRESS NOTES
Problem: Self Care Deficits Care Plan (Adult)  Goal: *Acute Goals and Plan of Care (Insert Text)  Occupational Therapy Goals  Initiated 4/24/2018    1. Patient will perform lower body dressing with supervision/set-up using AE PRN within 7 days. 2.  Patient will perform toilet transfer with supervision/set-up using most appropriate DME within 7 days. 3.  Patient will perform grooming tasks, at supervision/set-up within 7 days. 4.  Patient will don/doff back brace at supervision/set-up within 7 days. 5.  Patient will verbalize/demonstrate 3/3 back precautions during ADL tasks without cues within 7 days. Occupational Therapy EVALUATION  Patient: Nikhil Caballero (22 y.o. female)  Date: 4/24/2018  Primary Diagnosis: Acute left lumbar radiculopathy [M54.16]  Lumbar stenosis with neurogenic claudication [M48.062]  Lumbar adjacent segment disease with spondylolisthesis [M51.36, M43.16]  Procedure(s) (LRB):  L4-L5, L5-S1 ANTERIOR LUMBAR INTERBODY FUSION WITH INSTRUMENTATION (N/A) 1 Day Post-Op   Precautions: fall, back       ASSESSMENT :  Based on the objective data described below, the patient presents with hospital admission secondary to L4-L5, L5-S1 anterior lumbar interbody fusion. Patient now post op day 1 and initially declining OT evaluation due to pain, but reluctantly agreeable at second attempt. Patient educated with log roll technique for OOB, however patient declined therapist assistance and preferred spouse stand firm at EOB and allow patient to pull on him for supine to sit, and sit to stand. Patient reports significant pain, but unwilling to change technique at this time. Patient able to transfer to commode in bathroom and requires assistance for toileting, performed by spouse. Patient agreeable to sitting in chair at end of session, and brace donned while standing. Patient able to don with min assist but likely to require adjustment secondary to swollen abdomen and tight fit.   Patient left in chair, in NAD, spouse present. Patient educated on back precautions and task safety. She will benefit from continued OT services to increase safety and independence with ADL tasks. Patient will benefit from skilled intervention to address the above impairments. Patients rehabilitation potential is considered to be Good  Factors which may influence rehabilitation potential include:   [x]             None noted  []             Mental ability/status  []             Medical condition  []             Home/family situation and support systems  []             Safety awareness  []             Pain tolerance/management  []             Other:      PLAN :  Recommendations and Planned Interventions:  [x]               Self Care Training                  [x]        Therapeutic Activities  [x]               Functional Mobility Training    []        Cognitive Retraining  [x]               Therapeutic Exercises           [x]        Endurance Activities  [x]               Balance Training                   []        Neuromuscular Re-Education  []               Visual/Perceptual Training     [x]   Home Safety Training  [x]               Patient Education                 [x]        Family Training/Education  []               Other (comment):    Frequency/Duration: Patient will be followed by occupational therapy 5 times a week to address goals. Discharge Recommendations: Home Health and None  Further Equipment Recommendations for Discharge: TBD     SUBJECTIVE:   Patient stated Ok.     OBJECTIVE DATA SUMMARY:   HISTORY:   Past Medical History:   Diagnosis Date    Anemia     chronic    Chronic pain     Migraine     Obesity (BMI 35.0-39.9 without comorbidity) 2018    Stool color black     Because she takes iron     Past Surgical History:   Procedure Laterality Date    HX BACK SURGERY      HX  SECTION      HX LUMBAR DISKECTOMY      L 5 S1    HX TUBAL LIGATION         Prior Level of Function/Environment/Context: independent   Occupations in which the patient is/was successful, what are the barriers preventing that success:   Performance Patterns (routines, roles, habits, and rituals):   Personal Interests and/or values:   Expanded or extensive additional review of patient history:     Home Situation  Home Environment: Private residence  # Steps to Enter: 2  One/Two Story Residence: Two story, live on 1st floor  Living Alone: No  Support Systems: Spouse/Significant Other/Partner  Patient Expects to be Discharged to[de-identified] Private residence  Current DME Used/Available at Home: Brace/Splint  Tub or Shower Type: Shower  [x]  Right hand dominant   []  Left hand dominant    EXAMINATION OF PERFORMANCE DEFICITS:  Cognitive/Behavioral Status:  Neurologic State: Alert  Orientation Level: Oriented X4     Perception: Appears intact  Perseveration: No perseveration noted  Safety/Judgement: Awareness of environment    Skin: intact as seen    Edema: swelling to abdomen    Hearing: Auditory  Auditory Impairment: None    Vision/Perceptual:                                     Range of Motion:  AROM: Within functional limits  PROM: Within functional limits                      Strength:  Strength: Generally decreased, functional                Coordination:  Coordination: Within functional limits  Fine Motor Skills-Upper: Left Intact; Right Intact    Gross Motor Skills-Upper: Left Intact; Right Intact    Tone & Sensation:  Tone: Normal  Sensation: Intact                      Balance:  Sitting: Intact  Standing: Impaired;Pull to stand; With support  Standing - Static: Fair  Standing - Dynamic : Fair    Functional Mobility and Transfers for ADLs:  Bed Mobility:  Rolling: Moderate assistance; Additional time  Supine to Sit: Moderate assistance; Additional time  Sit to Supine: Moderate assistance; Additional time  Scooting: Moderate assistance; Additional time    Transfers:  Sit to Stand: Moderate assistance; Additional time  Stand to Sit: Moderate assistance; Additional time  Bed to Chair: Moderate assistance; Additional time  Toilet Transfer : Moderate assistance    ADL Assessment:  Feeding: Independent    Oral Facial Hygiene/Grooming: Supervision (seated )    Bathing: Moderate assistance    Upper Body Dressing: Minimum assistance (including brace )    Lower Body Dressing: Moderate assistance    Toileting: Minimum assistance                ADL Intervention and task modifications:                                     Cognitive Retraining  Safety/Judgement: Awareness of environment    Therapeutic Exercise:     Functional Measure:  Barthel Index:    Bathin  Bladder: 10  Bowels: 10  Groomin  Dressin  Feeding: 10  Mobility: 0  Stairs: 0  Toilet Use: 5  Transfer (Bed to Chair and Back): 5  Total: 50       Barthel and G-code impairment scale:  Percentage of impairment CH  0% CI  1-19% CJ  20-39% CK  40-59% CL  60-79% CM  80-99% CN  100%   Barthel Score 0-100 100 99-80 79-60 59-40 20-39 1-19   0   Barthel Score 0-20 20 17-19 13-16 9-12 5-8 1-4 0      The Barthel ADL Index: Guidelines  1. The index should be used as a record of what a patient does, not as a record of what a patient could do. 2. The main aim is to establish degree of independence from any help, physical or verbal, however minor and for whatever reason. 3. The need for supervision renders the patient not independent. 4. A patient's performance should be established using the best available evidence. Asking the patient, friends/relatives and nurses are the usual sources, but direct observation and common sense are also important. However direct testing is not needed. 5. Usually the patient's performance over the preceding 24-48 hours is important, but occasionally longer periods will be relevant. 6. Middle categories imply that the patient supplies over 50 per cent of the effort. 7. Use of aids to be independent is allowed. Trista Dewitt., Barthel, DRaymond QUINONES. (2076). Functional evaluation: the Barthel Index. [address] [facility] (14)2. LUDA Vargas, Carlos Chambers., Lenord Shone., Scout, [address] (1999). Measuring the change indisability after inpatient rehabilitation; comparison of the responsiveness of the Barthel Index and Functional [place] Measure. Journal of Neurology, Neurosurgery, and Psychiatry, 66(4), 363-561. IFRAH Bro, SARAH BETH Oliver, & Rhea Harris M.A. (2004.) Assessment of post-stroke quality of life in cost-effectiveness studies: The usefulness of the Barthel Index and the EuroQoL-5D. Quality of Life Research, 13, 171-93         G codes: In compliance with CMSs Claims Based Outcome Reporting, the following G-code set was chosen for this patient based on their primary functional limitation being treated: The outcome measure chosen to determine the severity of the functional limitation was the Barthel Index  with a score of 50/100 which was correlated with the impairment scale. ? Self Care:     - CURRENT STATUS: CK - 40%-59% impaired, limited or restricted    - GOAL STATUS: CJ - 20%-39% impaired, limited or restricted    - D/C STATUS:  ---------------To be determined---------------     Occupational Therapy Evaluation Charge Determination   History Examination Decision-Making   LOW Complexity : Brief history review  LOW Complexity : 1-3 performance deficits relating to physical, cognitive , or psychosocial skils that result in activity limitations and / or participation restrictions  LOW Complexity : No comorbidities that affect functional and no verbal or physical assistance needed to complete eval tasks       Based on the above components, the patient evaluation is determined to be of the following complexity level: LOW   Pain:  Pain Scale 1: Numeric (0 - 10)  Pain Intensity 1: 1              Activity Tolerance:   VSS  Please refer to the flowsheet for vital signs taken during this treatment.   After treatment:   [x] Patient left in no apparent distress sitting up in chair  [] Patient left in no apparent distress in bed  [x] Call bell left within reach  [x] Nursing notified  [x] Caregiver present  [] Bed alarm activated    COMMUNICATION/EDUCATION:   The patients plan of care was discussed with: Physical Therapist and Registered Nurse. [x] Home safety education was provided and the patient/caregiver indicated understanding. [x] Patient/family have participated as able in goal setting and plan of care. [x] Patient/family agree to work toward stated goals and plan of care. [] Patient understands intent and goals of therapy, but is neutral about his/her participation. [] Patient is unable to participate in goal setting and plan of care. This patients plan of care is appropriate for delegation to [facility].     Thank you for this referral.  Iain Rebolledo OTR/L  Time Calculation: 34 mins

## 2018-04-25 VITALS
WEIGHT: 256.84 LBS | OXYGEN SATURATION: 97 % | HEART RATE: 91 BPM | RESPIRATION RATE: 16 BRPM | SYSTOLIC BLOOD PRESSURE: 117 MMHG | TEMPERATURE: 98.2 F | BODY MASS INDEX: 36.85 KG/M2 | DIASTOLIC BLOOD PRESSURE: 75 MMHG

## 2018-04-25 LAB
ANION GAP SERPL CALC-SCNC: 9 MMOL/L (ref 5–15)
BUN SERPL-MCNC: 7 MG/DL (ref 6–20)
BUN/CREAT SERPL: 9 (ref 12–20)
CALCIUM SERPL-MCNC: 7.7 MG/DL (ref 8.5–10.1)
CHLORIDE SERPL-SCNC: 111 MMOL/L (ref 97–108)
CO2 SERPL-SCNC: 20 MMOL/L (ref 21–32)
CREAT SERPL-MCNC: 0.76 MG/DL (ref 0.55–1.02)
GLUCOSE SERPL-MCNC: 110 MG/DL (ref 65–100)
HGB BLD-MCNC: 9.9 G/DL (ref 11.5–16)
POTASSIUM SERPL-SCNC: 3.4 MMOL/L (ref 3.5–5.1)
SODIUM SERPL-SCNC: 140 MMOL/L (ref 136–145)

## 2018-04-25 PROCEDURE — 97530 THERAPEUTIC ACTIVITIES: CPT

## 2018-04-25 PROCEDURE — 97535 SELF CARE MNGMENT TRAINING: CPT

## 2018-04-25 PROCEDURE — 80048 BASIC METABOLIC PNL TOTAL CA: CPT | Performed by: ORTHOPAEDIC SURGERY

## 2018-04-25 PROCEDURE — 97116 GAIT TRAINING THERAPY: CPT

## 2018-04-25 PROCEDURE — 74011250637 HC RX REV CODE- 250/637: Performed by: ORTHOPAEDIC SURGERY

## 2018-04-25 PROCEDURE — 74011250636 HC RX REV CODE- 250/636: Performed by: ORTHOPAEDIC SURGERY

## 2018-04-25 PROCEDURE — 36415 COLL VENOUS BLD VENIPUNCTURE: CPT | Performed by: ORTHOPAEDIC SURGERY

## 2018-04-25 PROCEDURE — 74011250637 HC RX REV CODE- 250/637: Performed by: PHYSICIAN ASSISTANT

## 2018-04-25 PROCEDURE — 85018 HEMOGLOBIN: CPT | Performed by: ORTHOPAEDIC SURGERY

## 2018-04-25 RX ORDER — POTASSIUM CHLORIDE 750 MG/1
40 TABLET, FILM COATED, EXTENDED RELEASE ORAL
Status: COMPLETED | OUTPATIENT
Start: 2018-04-25 | End: 2018-04-25

## 2018-04-25 RX ADMIN — CYCLOBENZAPRINE HYDROCHLORIDE 10 MG: 10 TABLET, FILM COATED ORAL at 11:11

## 2018-04-25 RX ADMIN — OXYCODONE HYDROCHLORIDE 10 MG: 5 TABLET ORAL at 00:49

## 2018-04-25 RX ADMIN — STANDARDIZED SENNA CONCENTRATE AND DOCUSATE SODIUM 1 TABLET: 8.6; 5 TABLET, FILM COATED ORAL at 08:16

## 2018-04-25 RX ADMIN — ONDANSETRON 4 MG: 2 INJECTION INTRAMUSCULAR; INTRAVENOUS at 00:49

## 2018-04-25 RX ADMIN — OXYCODONE HYDROCHLORIDE 5 MG: 5 TABLET ORAL at 14:20

## 2018-04-25 RX ADMIN — OXYCODONE HYDROCHLORIDE 5 MG: 5 TABLET ORAL at 11:11

## 2018-04-25 RX ADMIN — Medication 10 ML: at 06:00

## 2018-04-25 RX ADMIN — FAMOTIDINE 20 MG: 20 TABLET ORAL at 08:17

## 2018-04-25 RX ADMIN — POLYETHYLENE GLYCOL (3350) 17 G: 17 POWDER, FOR SOLUTION ORAL at 08:16

## 2018-04-25 RX ADMIN — POTASSIUM CHLORIDE 40 MEQ: 750 TABLET, EXTENDED RELEASE ORAL at 08:16

## 2018-04-25 RX ADMIN — OXYCODONE HYDROCHLORIDE 5 MG: 5 TABLET ORAL at 07:25

## 2018-04-25 RX ADMIN — FERROUS SULFATE TAB 325 MG (65 MG ELEMENTAL FE) 650 MG: 325 (65 FE) TAB at 07:25

## 2018-04-25 RX ADMIN — TOPIRAMATE 200 MG: 100 TABLET, FILM COATED ORAL at 08:16

## 2018-04-25 RX ADMIN — ACETAMINOPHEN 650 MG: 325 TABLET ORAL at 00:49

## 2018-04-25 NOTE — PROGRESS NOTES
Patient received 4 scripts,a copy of discharge instructions an a pair of stockings. All instructions and scripts read and explained to her and her .  Verbalized understanding

## 2018-04-25 NOTE — PROGRESS NOTES
FAUZIA SAMPSON [facility] nurse liaison, Trixie Jordan, and nurse navigator, Bennett Albarran, were notified that pt is being discharged today.   Angela Weathers LCSW

## 2018-04-25 NOTE — PROGRESS NOTES
Problem: Mobility Impaired (Adult and Pediatric)  Goal: *Acute Goals and Plan of Care (Insert Text)  Physical Therapy Goals  Initiated 4/24/2018    1. Patient will move from supine to sit and sit to supine , scoot up and down and roll side to side in bed with independence within 4 days. 2. Patient will perform sit to stand with independence within 4 days. 3. Patient will ambulate with independence for 200 feet with the least restrictive device within 4 days. 4. Patient will ascend/descend 2 stairs with  handrail(s) with independence within 4 days. 5. Patient will verbalize and demonstrate understanding of spinal precautions (No bending, lifting greater than 5 lbs, or twisting; log-roll technique; frequent repositioning as instructed) within 4 days. physical Therapy TREATMENT  Patient: Yvrose Hdez (29 y.o. female)  Date: 4/25/2018  Diagnosis: Acute left lumbar radiculopathy [M54.16]  Lumbar stenosis with neurogenic claudication [M48.062]  Lumbar adjacent segment disease with spondylolisthesis [M51.36, M43.16] <principal problem not specified>  Procedure(s) (LRB):  L4-L5, L5-S1 ANTERIOR LUMBAR INTERBODY FUSION WITH INSTRUMENTATION (N/A) 2 Days Post-Op  Precautions:   Log roll, Back brace when OOB, may be up to bathroom without the back brace. Chart, physical therapy assessment, plan of care and goals were reviewed. ASSESSMENT:  Based on the objective data described below, patient participated well with treatment today. Sit on the edge of bed supervision, sit to stand modified independent, ambulate without assistive device out on the 4th floor hallway modified independent. Decreased pace no loss of balance, up and down stairs modified independent. Reviewed back precautions and donning of back brace. Verbalized understanding and agreed with all instructions taught by PT. Patient cleared for discharge per Physical Therapy perspective once medically stable to go home.  Notified nurse who agreed to monitor patient. Progression toward goals:  [x]      Improving appropriately and progressing toward goals  []      Improving slowly and progressing toward goals  []      Not making progress toward goals and plan of care will be adjusted     PLAN:  Patient continues to benefit from skilled intervention to address the above impairments. Continue treatment per established plan of care. Discharge Recommendations:  None  Further Equipment Recommendations for Discharge:  none     SUBJECTIVE:   Patient stated I have been in and out of the bathroom.    The patient stated 3/3 back precautions. Reviewed all 3 with patient. OBJECTIVE DATA SUMMARY:   Critical Behavior:  Neurologic State: Alert  Orientation Level: Oriented X4  Cognition: Appropriate decision making  Safety/Judgement: Awareness of environment  Functional Mobility Training:  Bed Mobility:  Log Rolling: Supervision  Supine to Sit: Supervision  Sit to Supine: Supervision  Scooting: Supervision        Brace donned with  modified independence   Transfers:  Sit to Stand: Modified independent  Stand to Sit: Modified independent  Stand Pivot Transfers: Modified independent     Bed to Chair: Modified independent                    Balance:  Sitting: Intact  Standing: Intact; Without support  Standing - Static: Good  Standing - Dynamic : Good  Ambulation/Gait Training:  Distance (ft): 200 Feet (ft)     Ambulation - Level of Assistance: Modified independent     Gait Description (WDL): Exceptions to WDL  Gait Abnormalities: Antalgic        Base of Support: Widened     Speed/Soco: Pace decreased (<100 feet/min)                       Stairs:  Number of Stairs Trained: 4  Stairs - Level of Assistance: Modified independent  Rail Use: Both  Therapeutic Exercises:    Instructed patient to continue active range of motion exercise on both legs while up on chair or on bed.      Pain:  Pain Scale 1: Numeric (0 - 10)  Pain Intensity 1: 7  Pain Location 1: Back  Pain Orientation 1: Anterior  Pain Description 1: Aching  Pain Intervention(s) 1: Medication (see MAR)  Activity Tolerance:   Good. Please refer to the flowsheet for vital signs taken during this treatment.   After treatment:   [x]  Patient left in no apparent distress sitting up in chair  []  Patient left in no apparent distress in bed  [x]  Call bell left within reach  [x]  Nursing notified  [x]  Caregiver present  []  Bed alarm activated    COMMUNICATION/COLLABORATION:   The patients plan of care was discussed with: Certified Occupational Therapy Assistant, Registered Nurse and patient    Orlando Campbell PT   Time Calculation: 25 mins

## 2018-04-25 NOTE — PROGRESS NOTES
Bedside and Verbal shift change report given to Nikolas Carpio (oncoming nurse) by Ry Deras (offgoing nurse). Report included the following information SBAR, Kardex, Procedure Summary, MAR and Recent Results.

## 2018-04-25 NOTE — DISCHARGE INSTRUCTIONS
Lumbar Spinal Surgery Discharge Instructions   Dr. Fady Ramirez  [phone]    Activity:    Orlando Nishant You are going home a well person, be as active as possible. Your only exercise should be walking. Start with short frequent walks and increase your walking distance each day. Start with walking twice a day for 5 minutes and increase your distance each day 2-3 minutes until you reach 25 minutes twice a day. Limit the amount of time you sit to 20-30 minute intervals. Sitting for prolonged periods of time will be uncomfortable for you following your surgery.  No bending, lifting (of 5lbs or more), twisting, or straining.  Do not drive until your doctor states you may do so. However, you may ride in a car for short distances.  If you are required to wear a brace, you should wear it at all times when you are out of bed. You may remove it when sleeping unless your physician advises you against it.  When you are in the bed, you may lay on your back or on either side. Do not lie on your stomach.  You may resume sexual relations 3-4 weeks after surgery depending on how you are feeling.  Continue to use your incentive spirometer for deep breathing exercises. Driving:   You may not drive or return to work until instructed by your physician. However, you may ride in the car for short periods of time. Brace:   If you have a back brace, you should wear your brace at all times when you are out of bed. Do not wear the brace while in bed or showering.  Remember to always wear a cotton t-shirt underneath your brace.  Do not bend or twist when your brace is off. Diet:   You may resume your regular home diet as tolerated. If your throat is sore, you should eat soft foods for the first couple of days.  Be sure to drink plenty of fluids; it is important to keep yourself hydrated.  Avoid alcoholic beverages and ABSOLUTELY NO tobacco products.  Tobacco products will interfere with your healing. If you continue to use tobacco, you may end up needing another surgery in the future. Dressing: You have on a Prineo dressing. This is a waterproof bacteriostatic dressing that requires no post-operative care. Please do not peel the dressing off, or apply any oil based products, as they may expedite the deterioration of the mesh. The dressing will slowly chip off on its own.  Do not rub or apply lotion or ointments to incision site. Shower:   You may shower 5 days after your surgery.  You may remove your brace during showers.  NO not use tub baths, swimming pools or Jacuzzis. Medications:   Check with your physician before taking any anti-inflammatory medications. (Advil, Aleve, Ibuprofen, Aspirin)   Take your pain medication as directed   Do NOT take additional Tylenol if your prescribed pain medication has acetaminophen in it (Endocet/Percocet, Lortab, Norco)   It is important to have regular bowel movements. Pain medications can be constipating. Stool softeners, warm prune juice, increasing your water intake, and increasing fiber in your diet can help in preventing constipation.  Do NOT take laxatives if at all possible except in severe situations. It can result in a vicious cycle of constipation and diarrhea. Stockings:   You have been given T.E.D. stockings to wear. Continue to wear these for 7 days after your discharge. Put them on in the morning and take them off at night. They are used to increase your circulation and prevent blood clots from forming in your legs. Follow-Up    Please call ASAP to schedule your 1st post-operative appointment. This should be approximately 3 weeks from your surgery date. Notify your physician in you develop any of the following conditions:   Fever above 101 degrees for 24 hours.  Nausea or vomiting.  Severe headache.    Inability to urinate   Loss of bowel or bladder function (sudden onset of incontinence)   Changes in sensation in your extremities (numbness, tingling, loss of color).  Severe pain or pain not relieved by medications.  Redness, swelling, or drainage from your incision.  Persistent pain in the chest.    Pain in the calf of either leg.  Increased weakness (if this is greater than before your surgery). If you have any questions about your dressing contact your Orthopaedic Surgeons office. OFFICE OF DR. Marleen Haas   [phone]  [place] [address] [facility], , [address], [address] [facility]     YOU CAN RE-START TAKING YOUR DAILY MULTI-VITAMIN WHEN YOU ARE 1 WEEK OUT FROM SURGERY    STOP TAKING ANY PREVIOUSLY PRESCRIBED PAIN MEDICATION. YOU WERE GIVEN A NEW PRESCRIPTION FOR PAIN MEDICATION UPON DISCHARGE FROM THE HOSPITAL TODAY    ** IMPORTANT: UNDER YOUR HONEYCOMB DRESSING, YOU HAVE A PRINEO ADHESIVE MESH DIRECTLY OVER YOUR INCISION. THIS MESH WAS STERILELY GLUED TO YOUR SKIN DURING SURGERY. DO NOT REMOVE THIS. NO ONE ELSE SHOULD REMOVE IT EITHER. IT WILL COME OFF ON ITS OWN OVER TIME    YOUR HONEYCOMB DRESSING NEEDS TO BE REMOVED PRIOR TO SHOWERING. THEN THE PRINEO MESH CAN BE LEFT OPEN TO AIR    * WEAR YOUR BRACE AS ADVISED    * NO DRIVING UNTIL YOU ARE CLEARED TO DO SO BY YOUR SURGEON    * LIMIT LIFTING, BENDING AND TWISTING.  NO LIFTING MORE THAN 5 LBS    * MAKE SURE YOU ARE GETTING GOOD NUTRITION (Lean Protein, Vitamin D AND Calcium)    * DO NOT TAKE ANY NSAIDs FOR THE FIRST 3 MONTHS AFTER SURGERY (such as Advil/Ibuprofen/Motrin, Aleve/Naproxen/Naprosyn, Diclofenac, Celebrex, Meloxicam, Indomethacin, Goody's powder, BC powder etc.)    * NO NICOTINE PRODUCTS    * FULLY READ YOUR DISCHARGE INSTRUCTIONS

## 2018-04-25 NOTE — PROGRESS NOTES
Problem: Self Care Deficits Care Plan (Adult)  Goal: *Acute Goals and Plan of Care (Insert Text)  Occupational Therapy Goals  Initiated 4/24/2018    1. Patient will perform lower body dressing with supervision/set-up using AE PRN within 7 days. 2.  Patient will perform toilet transfer with supervision/set-up using most appropriate DME within 7 days. 3.  Patient will perform grooming tasks, at supervision/set-up within 7 days. 4.  Patient will don/doff back brace at supervision/set-up within 7 days. 5.  Patient will verbalize/demonstrate 3/3 back precautions during ADL tasks without cues within 7 days. Occupational Therapy TREATMENT/DISCHARGE  Patient: Yesika Garibay (2 y.o. female)  Date: 4/25/2018  Diagnosis: Acute left lumbar radiculopathy [M54.16]  Lumbar stenosis with neurogenic claudication [M48.062]  Lumbar adjacent segment disease with spondylolisthesis [M51.36, M43.16] <principal problem not specified>  Procedure(s) (LRB):  L4-L5, L5-S1 ANTERIOR LUMBAR INTERBODY FUSION WITH INSTRUMENTATION (N/A) 2 Days Post-Op  Precautions:    Chart, occupational therapy assessment, plan of care, and goals were reviewed. ASSESSMENT:  Pt agreeable to OT and her  present for treatment. She and her  stated pt was up earlier today to wash her hair and put on her make-up. This OT session, she sat edge of bed bed and donned her pants with mod I using cross legged method. She verbalized 2/3 spinal precautions and verbal cueing for no twisting. Pt ambulated to bathroom and performed toileting task with mod I, use of grab bar on the right. Pt washed her hands and no LOB. Ms Schuster Lute able to slip on shoes seated on commode. No further OT needed in acute care setting and clear from an OT standpoint.    Progression toward goals:  [x]            Improving appropriately and progressing toward goals  []            Improving slowly and progressing toward goals  []            Not making progress toward goals and plan of care will be adjusted     PLAN:  Patient will be discharged from occupational therapy at this time. Rationale for discharge:  [x]   Goals Achieved  []   [address]  []   Patient not participating in therapy  []   Other:  Discharge Recommendations:  None  Further Equipment Recommendations for Discharge: None for OT     SUBJECTIVE:   Patient stated I feel much better today.     OBJECTIVE DATA SUMMARY:   Cognitive/Behavioral Status:  Neurologic State: Alert  Orientation Level: Oriented X4  Cognition: Appropriate decision making             Functional Mobility and Transfers for ADLs:  Bed Mobility:     Mod I supine to sit. Transfers: Mod I sit to stand        Balance:  Sitting: Intact    ADL Intervention:     Lower Body Dressing Assistance  Dressing Assistance: Modified independent  Pants With Elastic Waist: Modified independent  Leg Crossed Method Used: Yes  Position Performed: Seated edge of bed    Toileting  Toileting Assistance: Modified independent     Pain:  Pain Scale 1: Numeric (0 - 10)  Pain Intensity 1: 7  Pain Location 1: Back  Pain Orientation 1: Anterior  Pain Description 1: Aching  Pain Intervention(s) 1: Medication (see MAR)  Activity Tolerance:   No signs/symptoms of distress or discomfort during OT  Please refer to the flowsheet for vital signs taken during this treatment.   After treatment:   [] Patient left in no apparent distress sitting up in chair  [x] Patient left in no apparent distress in bed  [x] Call bell left within reach  [x] Nursing notified  [x] Caregiver present  [] Bed alarm activated    COMMUNICATION/COLLABORATION:   The patients plan of care was discussed with: Physical Therapist and Occupational Therapist    NEIL Davenport  Time Calculation: 15 mins

## 2018-04-25 NOTE — PROGRESS NOTES
ORTHOPAEDIC LUMBAR FUSION PROGRESS NOTE    NAME:     Harish Hickman   :       1970   MRN:       [id number]   DATE:      2018    POD:    2 Days Post-Op  S/P:    Procedure(s):  L4-L5, L5-S1 ANTERIOR LUMBAR INTERBODY FUSION WITH INSTRUMENTATION    SUBJECTIVE:    C/O pain along surgical incision  No leg pain or numbness  Denies nausea/vomiting, chest pain, headache or shortness of breath  Pain controlled    Pt is passing some flatus    Recent Labs      18   0357   HGB  9.9*   NA  140   K  3.4*   CL  111*   CO2  20*   BUN  7   CREA  0.76   GLU  110*     Patient Vitals for the past 12 hrs:   BP Temp Pulse Resp SpO2   18 0739 105/57 (!) 100.5 °F (38.1 °C) 90 16 94 %   18 0400 104/69 99.1 °F (37.3 °C) 85 16 94 %   18 0041 93/56 100.1 °F (37.8 °C) 98 18 98 %       EXAM:    Non-distended. Dressing clean, dry and intact   Positive strength/ROM bilat lower ext. Neuro intact to sensation  Calves, soft & nontender  BL LEs NVID.  No peripheral edema      PLAN:  Continue PRN PO pain medications  PT/OT, OOB w/ assist  [address], [address] [place], [place] [address]cara Logan  Orthopaedic Surgery  Physician Assistant to Dr. Samara Hannah

## 2018-04-25 NOTE — PROGRESS NOTES
Spiritual Care Partner Volunteer visited patient in 4 Post Surg on 4/25/18.   Documented by: Charisma Valenzuela [id number] [facility] [place] ([id number])

## 2018-04-26 ENCOUNTER — HOME CARE VISIT (OUTPATIENT)
Dept: SCHEDULING | Facility: HOME HEALTH | Age: 48
End: 2018-04-26
Payer: COMMERCIAL

## 2018-04-26 ENCOUNTER — HOME CARE VISIT (OUTPATIENT)
Dept: HOME HEALTH SERVICES | Facility: HOME HEALTH | Age: 48
End: 2018-04-26
Payer: COMMERCIAL

## 2018-04-26 PROCEDURE — G0299 HHS/HOSPICE OF RN EA 15 MIN: HCPCS

## 2018-04-27 ENCOUNTER — HOME CARE VISIT (OUTPATIENT)
Dept: SCHEDULING | Facility: HOME HEALTH | Age: 48
End: 2018-04-27
Payer: COMMERCIAL

## 2018-04-27 PROCEDURE — G0151 HHCP-SERV OF PT,EA 15 MIN: HCPCS

## 2018-04-28 ENCOUNTER — TELEPHONE (OUTPATIENT)
Dept: INTERNAL MEDICINE CLINIC | Age: 48
End: 2018-04-28

## 2018-04-28 ENCOUNTER — HOME CARE VISIT (OUTPATIENT)
Dept: SCHEDULING | Facility: HOME HEALTH | Age: 48
End: 2018-04-28
Payer: COMMERCIAL

## 2018-04-28 PROCEDURE — G0300 HHS/HOSPICE OF LPN EA 15 MIN: HCPCS

## 2018-04-28 RX ORDER — NYSTATIN 100000 [USP'U]/ML
1 SUSPENSION ORAL 4 TIMES DAILY
Qty: 240 ML | Refills: 2 | Status: SHIPPED | OUTPATIENT
Start: 2018-04-28 | End: 2018-08-31 | Stop reason: ALTCHOICE

## 2018-04-29 VITALS
RESPIRATION RATE: 18 BRPM | HEART RATE: 78 BPM | TEMPERATURE: 98.1 F | OXYGEN SATURATION: 99 % | DIASTOLIC BLOOD PRESSURE: 82 MMHG | SYSTOLIC BLOOD PRESSURE: 108 MMHG

## 2018-04-29 VITALS
HEART RATE: 100 BPM | TEMPERATURE: 99.2 F | DIASTOLIC BLOOD PRESSURE: 62 MMHG | SYSTOLIC BLOOD PRESSURE: 108 MMHG | RESPIRATION RATE: 16 BRPM

## 2018-04-30 ENCOUNTER — HOME CARE VISIT (OUTPATIENT)
Dept: SCHEDULING | Facility: HOME HEALTH | Age: 48
End: 2018-04-30
Payer: COMMERCIAL

## 2018-04-30 VITALS
TEMPERATURE: 98.8 F | DIASTOLIC BLOOD PRESSURE: 82 MMHG | HEART RATE: 70 BPM | SYSTOLIC BLOOD PRESSURE: 108 MMHG | RESPIRATION RATE: 16 BRPM | OXYGEN SATURATION: 99 %

## 2018-04-30 VITALS
TEMPERATURE: 98.3 F | RESPIRATION RATE: 20 BRPM | DIASTOLIC BLOOD PRESSURE: 86 MMHG | HEART RATE: 80 BPM | SYSTOLIC BLOOD PRESSURE: 118 MMHG | OXYGEN SATURATION: 97 %

## 2018-04-30 PROCEDURE — G0151 HHCP-SERV OF PT,EA 15 MIN: HCPCS

## 2018-04-30 PROCEDURE — G0300 HHS/HOSPICE OF LPN EA 15 MIN: HCPCS

## 2018-04-30 NOTE — DISCHARGE SUMMARY
DISCHARGE SUMMARY     Patient: Bernard Davenport                             Medical Record Number: [id number]                : 1970  Age: 56 y.o. Admit Date: 2018  Discharge Date: 2018  Admission Diagnosis: Acute left lumbar radiculopathy [M54.16]  Lumbar stenosis with neurogenic claudication [M48.062]  Lumbar adjacent segment disease with spondylolisthesis [M51.36, M43.16]  Discharge Diagnosis:   Lumbar stenosis with neurogenic claudication [M48.062]  Lumbar adjacent segment disease with spondylolisthesis [M51.36, M43.16]  Procedures: Procedure(s):  L4-L5, L5-S1 ANTERIOR LUMBAR INTERBODY FUSION WITH INSTRUMENTATION  Surgeon: Swathi Sanchez MD  Co-surgeon:   Assistants:   Anesthesia: General  Complications: None     History of Present Illness:  Bernard Davenport is a 56 y.o. female with a history of intractable low back and radiculopathy. Despite conservative management and after clinical and radiographic evaluation, it was determined that she suffered from lumbar stenosis  and lumbar spondylolisthesis and would benefit from Procedure(s):  L4-L5, L5-S1 ANTERIOR LUMBAR INTERBODY FUSION WITH INSTRUMENTATION, which she consented to undergo after a discussion of the risks, benefits, alternatives, rehab concerns, and potential complications of surgery. Hospital Course:  Bernard Davenport tolerated the procedure well. She was transferred  to the recovery room in stable condition. After a brief stay, the patient was then transferred to the Orthopedic floor. On postoperative day #1, the dressing was clean and dry and she was neurovascularly intact. The patient was afebrile and vital signs were stable. Calves were soft and non-tender bilaterally. Bernard Davenport made excellent progress with physical therapy and was discharged to Home w/ Home Health in stable condition on postoperative day 2.   She was provided with routine postoperative instructions and advised to follow up in my office in 3 weeks following discharge from the hospital.  She was given prescriptions for medication to control post-operative symptoms. Discharge Medications:  Discharge Medication List as of 4/25/2018  2:10 PM      START taking these medications    Details   cyclobenzaprine (FLEXERIL) 10 mg tablet Take 1 Tab by mouth three (3) times daily as needed for Muscle Spasm(s). , Print, Disp-60 Tab, R-2      docusate sodium (COLACE) 100 mg capsule Take 1 Cap by mouth two (2) times a day., Print, Disp-60 Cap, R-2      promethazine (PHENERGAN) 25 mg tablet Take 1 Tab by mouth every six (6) hours as needed for Nausea. , Print, Disp-60 Tab, R-2      oxyCODONE IR (ROXICODONE) 5 mg immediate release tablet Take 1-2 tablets PO every 4 to 6 hours prn post-operative pain, Print, Disp-90 Tab, R-0         CONTINUE these medications which have NOT CHANGED    Details   !! topiramate (TOPAMAX) 200 mg tablet Take 200 mg by mouth daily. , Historical Med      !! topiramate (TOPAMAX) 200 mg tablet Take 300 mg by mouth every evening., Historical Med      RELPAX 40 mg tablet TAKE 1 TABLET BY MOUTH ONCE AS NEEDED. MAY REPEAT IN 2 HOURS IF NEEDED, Normal, Disp-9 Tab, R-5, MELISSA      ferrous sulfate (IRON) 325 mg (65 mg iron) tablet Take  by mouth Daily (before breakfast). , Historical Med       !! - Potential duplicate medications found. Please discuss with provider.       STOP taking these medications       ibuprofen (ADVIL) 200 mg tablet Comments:   Reason for Stopping:         multivitamin (ONE A DAY) tablet Comments:   Reason for Stopping:         HYDROcodone-acetaminophen (LORTAB) 5-500 mg per tablet Comments:   Reason for Stopping:               [place], [place]  4/25/2018  Orthopaedic Surgery  Physician Assistant to Dr. Robson Arce

## 2018-05-02 ENCOUNTER — HOME CARE VISIT (OUTPATIENT)
Dept: SCHEDULING | Facility: HOME HEALTH | Age: 48
End: 2018-05-02
Payer: COMMERCIAL

## 2018-05-02 ENCOUNTER — HOME CARE VISIT (OUTPATIENT)
Dept: HOME HEALTH SERVICES | Facility: HOME HEALTH | Age: 48
End: 2018-05-02
Payer: COMMERCIAL

## 2018-05-02 VITALS
HEART RATE: 82 BPM | RESPIRATION RATE: 16 BRPM | DIASTOLIC BLOOD PRESSURE: 82 MMHG | OXYGEN SATURATION: 99 % | TEMPERATURE: 98.7 F | SYSTOLIC BLOOD PRESSURE: 108 MMHG

## 2018-05-02 PROCEDURE — G0151 HHCP-SERV OF PT,EA 15 MIN: HCPCS

## 2018-05-02 PROCEDURE — G0299 HHS/HOSPICE OF RN EA 15 MIN: HCPCS

## 2018-05-03 VITALS
HEART RATE: 78 BPM | SYSTOLIC BLOOD PRESSURE: 116 MMHG | TEMPERATURE: 96.7 F | DIASTOLIC BLOOD PRESSURE: 88 MMHG | RESPIRATION RATE: 18 BRPM | OXYGEN SATURATION: 97 %

## 2018-05-04 ENCOUNTER — HOME CARE VISIT (OUTPATIENT)
Dept: SCHEDULING | Facility: HOME HEALTH | Age: 48
End: 2018-05-04
Payer: COMMERCIAL

## 2018-05-04 VITALS
HEART RATE: 77 BPM | SYSTOLIC BLOOD PRESSURE: 108 MMHG | DIASTOLIC BLOOD PRESSURE: 62 MMHG | OXYGEN SATURATION: 99 % | TEMPERATURE: 98.4 F | RESPIRATION RATE: 18 BRPM

## 2018-05-04 PROCEDURE — G0151 HHCP-SERV OF PT,EA 15 MIN: HCPCS

## 2018-05-05 ENCOUNTER — APPOINTMENT (OUTPATIENT)
Dept: CT IMAGING | Age: 48
End: 2018-05-05
Attending: EMERGENCY MEDICINE
Payer: COMMERCIAL

## 2018-05-05 ENCOUNTER — HOSPITAL ENCOUNTER (EMERGENCY)
Age: 48
Discharge: HOME | End: 2018-05-05
Attending: EMERGENCY MEDICINE
Payer: COMMERCIAL

## 2018-05-05 ENCOUNTER — APPOINTMENT (OUTPATIENT)
Dept: GENERAL RADIOLOGY | Age: 48
End: 2018-05-05
Attending: EMERGENCY MEDICINE
Payer: COMMERCIAL

## 2018-05-05 VITALS
HEART RATE: 99 BPM | WEIGHT: 250.66 LBS | TEMPERATURE: 99.7 F | BODY MASS INDEX: 35.89 KG/M2 | OXYGEN SATURATION: 100 % | RESPIRATION RATE: 16 BRPM | SYSTOLIC BLOOD PRESSURE: 113 MMHG | DIASTOLIC BLOOD PRESSURE: 74 MMHG | HEIGHT: 70 IN

## 2018-05-05 DIAGNOSIS — K59.00 CONSTIPATION, UNSPECIFIED CONSTIPATION TYPE: Primary | ICD-10-CM

## 2018-05-05 LAB
ALBUMIN SERPL-MCNC: 3.2 G/DL (ref 3.5–5)
ALBUMIN/GLOB SERPL: 0.8 {RATIO} (ref 1.1–2.2)
ALP SERPL-CCNC: 128 U/L (ref 45–117)
ALT SERPL-CCNC: 79 U/L (ref 12–78)
ANION GAP SERPL CALC-SCNC: 8 MMOL/L (ref 5–15)
AST SERPL-CCNC: 20 U/L (ref 15–37)
BASOPHILS # BLD: 0 K/UL (ref 0–0.1)
BASOPHILS NFR BLD: 1 % (ref 0–1)
BILIRUB SERPL-MCNC: 0.1 MG/DL (ref 0.2–1)
BUN SERPL-MCNC: 10 MG/DL (ref 6–20)
BUN/CREAT SERPL: 10 (ref 12–20)
CALCIUM SERPL-MCNC: 8.5 MG/DL (ref 8.5–10.1)
CHLORIDE SERPL-SCNC: 107 MMOL/L (ref 97–108)
CO2 SERPL-SCNC: 24 MMOL/L (ref 21–32)
CREAT SERPL-MCNC: 0.97 MG/DL (ref 0.55–1.02)
DIFFERENTIAL METHOD BLD: NORMAL
EOSINOPHIL # BLD: 0 K/UL (ref 0–0.4)
EOSINOPHIL NFR BLD: 1 % (ref 0–7)
ERYTHROCYTE [DISTWIDTH] IN BLOOD BY AUTOMATED COUNT: 11.5 % (ref 11.5–14.5)
GLOBULIN SER CALC-MCNC: 4 G/DL (ref 2–4)
GLUCOSE SERPL-MCNC: 89 MG/DL (ref 65–100)
HCT VFR BLD AUTO: 37.6 % (ref 35–47)
HGB BLD-MCNC: 12 G/DL (ref 11.5–16)
IMM GRANULOCYTES # BLD: 0 K/UL (ref 0–0.04)
IMM GRANULOCYTES NFR BLD AUTO: 0 % (ref 0–0.5)
LYMPHOCYTES # BLD: 1.7 K/UL (ref 0.8–3.5)
LYMPHOCYTES NFR BLD: 41 % (ref 12–49)
MCH RBC QN AUTO: 30 PG (ref 26–34)
MCHC RBC AUTO-ENTMCNC: 31.9 G/DL (ref 30–36.5)
MCV RBC AUTO: 94 FL (ref 80–99)
MONOCYTES # BLD: 0.3 K/UL (ref 0–1)
MONOCYTES NFR BLD: 8 % (ref 5–13)
NEUTS SEG # BLD: 2 K/UL (ref 1.8–8)
NEUTS SEG NFR BLD: 49 % (ref 32–75)
NRBC # BLD: 0 K/UL (ref 0–0.01)
NRBC BLD-RTO: 0 PER 100 WBC
PLATELET # BLD AUTO: 368 K/UL (ref 150–400)
PMV BLD AUTO: 9.7 FL (ref 8.9–12.9)
POTASSIUM SERPL-SCNC: 3.9 MMOL/L (ref 3.5–5.1)
PROT SERPL-MCNC: 7.2 G/DL (ref 6.4–8.2)
RBC # BLD AUTO: 4 M/UL (ref 3.8–5.2)
SODIUM SERPL-SCNC: 139 MMOL/L (ref 136–145)
WBC # BLD AUTO: 4.1 K/UL (ref 3.6–11)

## 2018-05-05 PROCEDURE — 85025 COMPLETE CBC W/AUTO DIFF WBC: CPT | Performed by: EMERGENCY MEDICINE

## 2018-05-05 PROCEDURE — 80053 COMPREHEN METABOLIC PANEL: CPT | Performed by: EMERGENCY MEDICINE

## 2018-05-05 PROCEDURE — 99285 EMERGENCY DEPT VISIT HI MDM: CPT

## 2018-05-05 PROCEDURE — 74177 CT ABD & PELVIS W/CONTRAST: CPT

## 2018-05-05 PROCEDURE — 36415 COLL VENOUS BLD VENIPUNCTURE: CPT | Performed by: EMERGENCY MEDICINE

## 2018-05-05 PROCEDURE — 74011636320 HC RX REV CODE- 636/320: Performed by: RADIOLOGY

## 2018-05-05 PROCEDURE — 74019 RADEX ABDOMEN 2 VIEWS: CPT

## 2018-05-05 RX ADMIN — IOPAMIDOL 100 ML: 755 INJECTION, SOLUTION INTRAVENOUS at 15:41

## 2018-05-05 NOTE — ED TRIAGE NOTES
Post operative back surgery 2 weeks ago. Taking oxycodone for pain. Last BM was Wednesday, liquid stool after drinking magnesium citrate. LBM prior to that was 4/21/2018. Taking stool softener BID, miralax for several days, fleets enema Tuesday,  drank the mag citrate again this morning. No results. Has no appetite and is feeling very bloated /a lot of pressure in abdomen and lower back. No vomiting.

## 2018-05-05 NOTE — ED PROVIDER NOTES
HPI Comments: 53 y.o. female with past medical history significant for chronic pain, chronic anemia, migraines, and obesity who presents from home with chief complaint of constipation. Patient reports she recently had back surgery by Dr. Tania Mcnulty on 18 approximately 12 days ago. Patient admits she has not had a normal bowel movement since her surgery and has had worsening abdominal bloating without pain. Patient mentions an accompanying loss of an appetite, but she has still been forcing herself to eat. Patient also reports drinking an adequate amount of fluids. Patient reports attempting Miralax and Mag Citrate, but she only had a small bowel movement with this. She also used a fleets enema 4 days without any relief. Patient denies any surgery complication with the exception of her constipation. Patient denies any nausea, vomiting, and abdominal pain. There are no other acute medical concerns at this time. Old Chart Review: Per note, patient had L4-5, L5-S1 anterior lumbar interbody fusion with isntrumentation by Dr. Mode Galvan on 18. Social hx: Tobacco Use: No, Alcohol Use: Yes, Drug Use: No    PCP: Margret Coelho MD  Orthopedic Surgeon: Mode Galvan MD    Note written by Murali Desai, as dictated by Danya Camacho MD 1:21 PM        The history is provided by the patient, medical records and the spouse.         Past Medical History:   Diagnosis Date    Anemia     chronic    Chronic pain     Migraine     Obesity (BMI 35.0-39.9 without comorbidity) 2018    Stool color black     Because she takes iron       Past Surgical History:   Procedure Laterality Date    HX BACK SURGERY      HX  SECTION      HX LUMBAR DISKECTOMY      L 5 S1    HX TUBAL LIGATION           Family History:   Problem Relation Age of Onset    Suicide Father     Depression Father     Cancer Father      lung,    Depression Mother     Other Sister      Kidney Issues    No Known Problems Sister  No Known Problems Sister     Hypertension Maternal Grandfather     Diabetes Maternal Grandfather     Cancer Paternal Grandmother      lung       Social History     Social History    Marital status:      Spouse name: N/A    Number of children: N/A    Years of education: N/A     Occupational History    Not on file. Social History Main Topics    Smoking status: Never Smoker    Smokeless tobacco: Never Used    Alcohol use 0.6 oz/week     1 Glasses of wine per week    Drug use: No    Sexual activity: Yes     Partners: Male     Birth control/ protection: Surgical     Other Topics Concern    Not on file     Social History Narrative         ALLERGIES: Topamax [topiramate]    Review of Systems   Constitutional: Positive for appetite change. Gastrointestinal: Positive for abdominal distention and constipation. Negative for abdominal pain, nausea and vomiting. All other systems reviewed and are negative. Vitals:    05/05/18 1309   BP: 106/78   Pulse: 100   Resp: 19   Temp: 99.7 °F (37.6 °C)   SpO2: 98%   Weight: 113.7 kg (250 lb 10.6 oz)   Height: 5' 10\" (1.778 m)            Physical Exam   Constitutional: She is oriented to person, place, and time. She appears well-developed and well-nourished. No distress. No acute distress. HENT:   Head: Normocephalic and atraumatic. Eyes: Conjunctivae are normal. No scleral icterus. Neck: Neck supple. No tracheal deviation present. Cardiovascular: Normal rate, regular rhythm, normal heart sounds and intact distal pulses. Exam reveals no gallop and no friction rub. No murmur heard. Pulmonary/Chest: Effort normal and breath sounds normal. She has no wheezes. She has no rales. Abdominal: Soft. She exhibits no distension. There is no tenderness. There is no rebound and no guarding. Patient has a non-tender abdomen. Musculoskeletal: She exhibits no edema. Patient has a back brace in place.     Neurological: She is alert and oriented to person, place, and time. Skin: Skin is warm and dry. No rash noted. Psychiatric: She has a normal mood and affect. Nursing note and vitals reviewed. Note written by Murali Ogden, as dictated by Tip Clarke MD 1:21 PM    [facility]      ED Course       Procedures      1:40 PM  Change of shift. Care of patient signed over to Dr Sherry Roach. Bedside handoff complete. A/P: constipation S/P recent back surgery; she's been taking Percocet for pain; plan for abd films and soap suds enema if no obstruction; potentially home with [facility].   Tip Clarke MD  1:42 PM

## 2018-05-05 NOTE — ED NOTES
Pt. Says that she feels better. She was able to have a bowel movement in the ER. No obstruction on CT. I will start her on GoLytely to take if she is having trouble a bowel movement tomorrow. Pt. To f/u with her doctor or return to the ER with worsening sx.       Madelyn Sullivan MD  6:49 PM

## 2018-05-05 NOTE — DISCHARGE INSTRUCTIONS
Constipation: Care Instructions  Your Care Instructions    Constipation means that you have a hard time passing stools (bowel movements). People pass stools from 3 times a day to once every 3 days. What is normal for you may be different. Constipation may occur with pain in the rectum and cramping. The pain may get worse when you try to pass stools. Sometimes there are small amounts of bright red blood on toilet paper or the surface of stools. This is because of enlarged veins near the rectum (hemorrhoids). A few changes in your diet and lifestyle may help you avoid ongoing constipation. Your doctor may also prescribe medicine to help loosen your stool. Some medicines can cause constipation. These include pain medicines and antidepressants. Tell your doctor about all the medicines you take. Your doctor may want to make a medicine change to ease your symptoms. Follow-up care is a key part of your treatment and safety. Be sure to make and go to all appointments, and call your doctor if you are having problems. It's also a good idea to know your test results and keep a list of the medicines you take. How can you care for yourself at home? · Drink plenty of fluids, enough so that your urine is light yellow or clear like water. If you have kidney, heart, or liver disease and have to limit fluids, talk with your doctor before you increase the amount of fluids you drink. · Include high-fiber foods in your diet each day. These include fruits, vegetables, beans, and whole grains. · Get at least 30 minutes of exercise on most days of the week. Walking is a good choice. You also may want to do other activities, such as running, swimming, cycling, or playing tennis or team sports. · Take a fiber supplement, such as Citrucel or Metamucil, every day. Read and follow all instructions on the label. · Schedule time each day for a bowel movement. A daily routine may help.  Take your time having your bowel movement. · Support your feet with a small step stool when you sit on the toilet. This helps flex your hips and places your pelvis in a squatting position. · Your doctor may recommend an over-the-counter laxative to relieve your constipation. Examples are Milk of Magnesia and MiraLax. Read and follow all instructions on the label. Do not use laxatives on a long-term basis. When should you call for help? Call your doctor now or seek immediate medical care if:  ? · You have new or worse belly pain. ? · You have new or worse nausea or vomiting. ? · You have blood in your stools. ? Watch closely for changes in your health, and be sure to contact your doctor if:  ? · Your constipation is getting worse. ? · You do not get better as expected. Where can you learn more? Go to http://shashank-elías.info/. Enter 21 [phone] in the search box to learn more about \"Constipation: Care Instructions. \"  Current as of: March 20, 2017  Content Version: 11.4  © 1638-7097 [employer]. Care instructions adapted under license by [employer] (which disclaims liability or warranty for this information). If you have questions about a medical condition or this instruction, always ask your healthcare professional. [address] any warranty or liability for your use of this information.

## 2018-05-07 ENCOUNTER — HOME CARE VISIT (OUTPATIENT)
Dept: SCHEDULING | Facility: HOME HEALTH | Age: 48
End: 2018-05-07
Payer: COMMERCIAL

## 2018-05-07 VITALS
DIASTOLIC BLOOD PRESSURE: 80 MMHG | HEART RATE: 78 BPM | TEMPERATURE: 98.6 F | OXYGEN SATURATION: 99 % | SYSTOLIC BLOOD PRESSURE: 108 MMHG | RESPIRATION RATE: 16 BRPM

## 2018-05-07 PROCEDURE — G0151 HHCP-SERV OF PT,EA 15 MIN: HCPCS

## 2018-05-08 ENCOUNTER — OFFICE VISIT (OUTPATIENT)
Dept: NEUROLOGY | Age: 48
End: 2018-05-08

## 2018-05-08 VITALS
WEIGHT: 249 LBS | DIASTOLIC BLOOD PRESSURE: 68 MMHG | RESPIRATION RATE: 18 BRPM | HEART RATE: 86 BPM | HEIGHT: 70 IN | OXYGEN SATURATION: 96 % | SYSTOLIC BLOOD PRESSURE: 108 MMHG | BODY MASS INDEX: 35.65 KG/M2

## 2018-05-08 DIAGNOSIS — G43.009 MIGRAINE WITHOUT AURA AND WITHOUT STATUS MIGRAINOSUS, NOT INTRACTABLE: ICD-10-CM

## 2018-05-08 RX ORDER — ELETRIPTAN HYDROBROMIDE 40 MG/1
TABLET, FILM COATED ORAL
Qty: 9 TAB | Refills: 5 | Status: SHIPPED | OUTPATIENT
Start: 2018-05-08 | End: 2018-06-01 | Stop reason: SDUPTHER

## 2018-05-08 RX ORDER — TOPIRAMATE 200 MG/1
300 TABLET ORAL EVERY EVENING
Qty: 90 TAB | Refills: 1 | Status: SHIPPED | OUTPATIENT
Start: 2018-05-08 | End: 2018-05-09 | Stop reason: SDUPTHER

## 2018-05-08 RX ORDER — TOPIRAMATE 200 MG/1
200 TABLET ORAL DAILY
Qty: 90 TAB | Refills: 1 | Status: SHIPPED | OUTPATIENT
Start: 2018-05-08 | End: 2018-05-09 | Stop reason: SDUPTHER

## 2018-05-08 NOTE — PROGRESS NOTES
Neurology Consult      Subjective:      Sarah Jamison is a 53 y.o. female who comes in with chronic migraine history. Currently finds Relpax is acceptable as a rescue which she often and uses with Advil at moment 0. Is currently on Topamax taking 300+200 mg daily for 500 mg. Had a very interesting issue with the Sprix nasal that caused immediate intensification of headaches on first and only encounter. Needless to say does not want that or any other nasal sprays at this point. Could add Keppra as a different preventative drug but not interested. Depakote with weight gain again is not interested. Migranal nasal may be helpful but not interested and also not interested in injectables such as sumatriptan and etc. recent low back surgery and wearing a corset. We talked about the possibility of optimism with the [employer] of the new CG RP antagonists and it could happen this year although no promises. Revisit 6 months. Current Outpatient Prescriptions   Medication Sig Dispense Refill    RELPAX 40 mg tablet TAKE 1 TABLET BY MOUTH ONCE AS NEEDED. MAY REPEAT IN 2 HOURS IF NEEDED 9 Tab 5    topiramate (TOPAMAX) 200 mg tablet Take 1 Tab by mouth daily. Indications: MIGRAINE PREVENTION 90 Tab 1    topiramate (TOPAMAX) 200 mg tablet Take 2 Tabs by mouth every evening. 90 Tab 1    ferrous sulfate 325 mg (65 mg iron) tablet Take 325 mg by mouth Daily (before breakfast).  cyclobenzaprine (FLEXERIL) 10 mg tablet Take 1 Tab by mouth three (3) times daily as needed for Muscle Spasm(s). 60 Tab 2    docusate sodium (COLACE) 100 mg capsule Take 1 Cap by mouth two (2) times a day. 60 Cap 2    oxyCODONE IR (ROXICODONE) 5 mg immediate release tablet Take 1-2 tablets PO every 4 to 6 hours prn post-operative pain 90 Tab 0    polyethylene glycol (MIRALAX) 17 gram/dose powder Take 17 g by mouth daily as needed (constipation).  Indications: constipation      nystatin (MYCOSTATIN) 100,000 unit/mL suspension Take 5 mL by mouth four (4) times daily. 240 mL 2    promethazine (PHENERGAN) 25 mg tablet Take 1 Tab by mouth every six (6) hours as needed for Nausea. 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      Social History Main Topics    Smoking status: Never Smoker    Smokeless tobacco: Never Used    Alcohol use 0.6 oz/week     1 Glasses of wine per week      Comment: every 2-4 weeks    Drug use: No    Sexual activity: Yes     Partners: Male     Birth control/ protection: Surgical     Other Topics Concern    Not on file     Social History Narrative      Family History   Problem Relation Age of Onset    Suicide Father     Depression Father     Cancer Father      lung,    Depression Mother     Other Sister      Kidney Issues    No Known Problems Sister     No Known Problems Sister     Hypertension Maternal Grandfather     Diabetes Maternal Grandfather     Cancer Paternal Grandmother      lung      Visit Vitals    /68    Pulse 86    Resp 18    Ht 5' 10\" (1.778 m)    Wt 112.9 kg (249 lb)    LMP 2018 (Exact Date)    SpO2 96%    BMI 35.73 kg/m2        Review of Systems:   A comprehensive review of systems was negative except for that written in the HPI.      Neuro Exam:     Appearance: The patient is well developed, well nourished, provides a coherent history and is in no acute distress. Mental Status: Oriented to time, place and person. Mood and affect appropriate. Cranial Nerves:   Intact visual fields. Fundi are benign. MONSTER, EOM's full, no nystagmus, no ptosis. Facial sensation is normal. Corneal reflexes are intact. Facial movement is symmetric. Hearing is normal bilaterally. Palate is midline with normal sternocleidomastoid and trapezius muscles are normal. Tongue is midline. Motor:  5/5 strength in upper and lower proximal and distal muscles. Normal bulk and tone. No fasciculations. Reflexes:   Deep tendon reflexes 2+/4 and symmetrical.   Sensory:   Normal to touch, pinprick and vibration. Gait:  Normal gait. Tremor:   No tremor noted. Cerebellar:  No cerebellar signs present. Neurovascular:  Normal heart sounds and regular rhythm, peripheral pulses intact, and no carotid bruits. Assessment:   Migraine headaches. Based on what we can do reasonably with previous drug trials tolerability and efficacy will renew the Topamax as requested as well as Relpax. Hopefully this year 1 of the CG RP antagonists will be approved by the FDA. Plan:   Revisit 6 months.   Signed by :  Yana Rachel MD

## 2018-05-08 NOTE — PATIENT INSTRUCTIONS
[facility]   Statement to Patients  April 1, 2014      In an effort to ensure the large volume of patient prescription refills is processed in the most efficient and expeditious manner, we are asking our patients to assist us by calling your Pharmacy for all prescription refills, this will include also your  Mail Order Pharmacy. The pharmacy will contact our office electronically to continue the refill process. Please do not wait until the last minute to call your pharmacy. We need at least 48 hours (2days) to fill prescriptions. We also encourage you to call your pharmacy before going to  your prescription to make sure it is ready. With regard to controlled substance prescription refill requests (narcotic refills) that need to be picked up at our office, we ask your cooperation by providing us with at least 72 hours (3days) notice that you will need a refill. We will not refill narcotic prescription refill requests after 4:00pm on any weekday, Monday through Thursday, or after 2:00pm on Fridays, or on the weekends. We encourage everyone to explore another way of getting your prescription refill request processed using [employer], our patient web portal through our electronic medical record system. [employer] is an efficient and effective way to communicate your medication request directly to the office and  downloadable as an ashleigh on your smart phone . [employer] also features a review functionality that allows you to view your medication list as well as leave messages for your physician. Are you ready to get connected? If so please review the attatched instructions or speak to any of our staff to get you set up right away! Thank you so much for your cooperation. Should you have any questions please contact our Practice Administrator.     The Physicians and Staff,  [facility] [id number] [address]  What is a living will?    A living will is a legal form you use to write down the kind of care you want at the end of your life. It is used by the health professionals who will treat you if you aren't able to decide for yourself. If you put your wishes in writing, your loved ones and others will know what kind of care you want. They won't need to guess. This can ease your mind and be helpful to others. A living will is not the same as an estate or property will. An estate will explains what you want to happen with your money and property after you die. Is a living will a legal document? A living will is a legal document. Each state has its own laws about living christine. If you move to another state, make sure that your living will is legal in the state where you now live. Or you might use a universal form that has been approved by many states. This kind of form can sometimes be completed and stored online. Your electronic copy will then be available wherever you have a connection to the Internet. In most cases, doctors will respect your wishes even if you have a form from a different state. · You don't need an  to complete a living will. But legal advice can be helpful if your state's laws are unclear, your health history is complicated, or your family can't agree on what should be in your living will. · You can change your living will at any time. Some people find that their wishes about end-of-life care change as their health changes. · In addition to making a living will, think about completing a medical power of  form. This form lets you name the person you want to make end-of-life treatment decisions for you (your \"health care agent\") if you're not able to. Many hospitals and nursing homes will give you the forms you need to complete a living will and a medical power of . · Your living will is used only if you can't make or communicate decisions for yourself anymore.  If you become able to make decisions again, you can accept or refuse any treatment, no matter what you wrote in your living will. · Your state may offer an online registry. This is a place where you can store your living will online so the doctors and nurses who need to treat you can find it right away. What should you think about when creating a living will? Talk about your end-of-life wishes with your family members and your doctor. Let them know what you want. That way the people making decisions for you won't be surprised by your choices. Think about these questions as you make your living will:  · Do you know enough about life support methods that might be used? If not, talk to your doctor so you know what might be done if you can't breathe on your own, your heart stops, or you're unable to swallow. · What things would you still want to be able to do after you receive life-support methods? Would you want to be able to walk? To speak? To eat on your own? To live without the help of machines? · If you have a choice, where do you want to be cared for? In your home? At a hospital or nursing home? · Do you want certain Latter-day practices performed if you become very ill? · If you have a choice at the end of your life, where would you prefer to die? At home? In a hospital or nursing home? Somewhere else? · Would you prefer to be buried or cremated? · Do you want your organs to be donated after you die? What should you do with your living will? · Make sure that your family members and your health care agent have copies of your living will. · Give your doctor a copy of your living will to keep in your medical record. If you have more than one doctor, make sure that each one has a copy. · You may want to put a copy of your living will where it can be easily found. Where can you learn more? Go to http://shashank-elías.info/. Enter L377 in the search box to learn more about \"Learning About Living Eloisa Nicholas. \"  Current as of: September 24, 2016  Content Version: 11.4  © 7367-8766 [employer]. Care instructions adapted under license by [employer] (which disclaims liability or warranty for this information). If you have questions about a medical condition or this instruction, always ask your healthcare professional. [address] any warranty or liability for your use of this information. Advance Directives: Care Instructions  Your Care Instructions  An advance directive is a legal way to state your wishes at the end of your life. It tells your family and your doctor what to do if you can no longer say what you want. There are two main types of advance directives. You can change them any time that your wishes change. · A living will tells your family and your doctor your wishes about life support and other treatment. · A durable power of  for health care lets you name a person to make treatment decisions for you when you can't speak for yourself. This person is called a health care agent. If you do not have an advance directive, decisions about your medical care may be made by a doctor or a  who doesn't know you. It may help to think of an advance directive as a gift to the people who care for you. If you have one, they won't have to make tough decisions by themselves. Follow-up care is a key part of your treatment and safety. Be sure to make and go to all appointments, and call your doctor if you are having problems. It's also a good idea to know your test results and keep a list of the medicines you take. How can you care for yourself at home? · Discuss your wishes with your loved ones and your doctor. This way, there are no surprises. · Many states have a unique form. Or you might use a universal form that has been approved by many states. This kind of form can sometimes be completed and stored online.  Your electronic copy will then be available wherever you have a connection to the Internet. In most cases, doctors will respect your wishes even if you have a form from a different state. · You don't need a  to do an advance directive. But you may want to get legal advice. · Think about these questions when you prepare an advance directive:  ¨ Who do you want to make decisions about your medical care if you are not able to? Many people choose a family member or close friend. ¨ Do you know enough about life support methods that might be used? If not, talk to your doctor so you understand. ¨ What are you most afraid of that might happen? You might be afraid of having pain, losing your independence, or being kept alive by machines. ¨ Where would you prefer to die? Choices include your home, a hospital, or a nursing home. ¨ Would you like to have information about hospice care to support you and your family? ¨ Do you want to donate organs when you die? ¨ Do you want certain Gnosticist practices performed before you die? If so, put your wishes in the advance directive. · Read your advance directive every year, and make changes as needed. When should you call for help? Be sure to contact your doctor if you have any questions. Where can you learn more? Go to http://shashank-elías.info/. Enter R264 in the search box to learn more about \"Advance Directives: Care Instructions. \"  Current as of: September 24, 2016  Content Version: 11.4  © 2697-1904 [employer]. Care instructions adapted under license by [employer] (which disclaims liability or warranty for this information). If you have questions about a medical condition or this instruction, always ask your healthcare professional. [place] [address] any warranty or liability for your use of this information. Patient history reviewed and patient examined.   Will renew meds by request and hopefully sometime this year perhaps 1 of the CGRP antagonists will be approved by the FDA and would be a consideration.

## 2018-05-08 NOTE — MR AVS SNAPSHOT
[address] [facility] [address] [facility] [id number] [phone] Patient: Jannette Hanna MRN: [id number] TWR:[id number] Visit Information Date & Time Provider Department Dept. Phone Encounter #  
 5/8/2018  8:40 AM Kaelyn Hayes MD [facility] Neu[facility] [phone] [id number] Follow-up Instructions Return in about 6 months (around 11/8/2018). Upcoming Health Maintenance Date Due  
 PAP AKA CERVICAL CYTOLOGY 5/1/1992 Influenza Age 5 to Adult 8/1/2018 DTaP/Tdap/Td series (2 - Td) 9/5/2022 Allergies as of 5/8/2018  Review Complete On: 5/8/2018 By: Kaelyn Hayes MD  
  
 Severity Noted Reaction Type Reactions Topamax [Topiramate]  12/06/2010    Other (comments) Low grade fever Only with brand name ok with generic Current Immunizations  Reviewed on 12/7/2017 Name Date Influenza Vaccine (Quad) PF 12/7/2017 TDAP Vaccine 9/5/2012 Not reviewed this visit You Were Diagnosed With   
  
 Codes Comments Migraine without aura and without status migrainosus, not intractable     ICD-10-CM: Y44.343 ICD-9-CM: 346.10 Vitals BP Pulse Resp Height(growth percentile) Weight(growth percentile) LMP  
 108/68 86 18 5' 10\" (1.778 m) 249 lb (112.9 kg) 04/24/2018 (Exact Date) SpO2 BMI OB Status Smoking Status 96% 35.73 kg/m2 Having regular periods Never Smoker Vitals History BMI and BSA Data Body Mass Index Body Surface Area 35.73 kg/m 2 2.36 m 2 Preferred Pharmacy Pharmacy Name Phone [id number] [place], [address] AT [facility] [phone] Your Updated Medication List  
  
   
This list is accurate as of 5/8/18  9:30 AM.  Always use your most recent med list.  
  
  
  
  
 cyclobenzaprine 10 mg tablet Commonly known as:  FLEXERIL  
 Take 1 Tab by mouth three (3) times daily as needed for Muscle Spasm(s). docusate sodium 100 mg capsule Commonly known as:  [place] Take 1 Cap by mouth two (2) times a day. ferrous sulfate 325 mg (65 mg iron) tablet Take 325 mg by mouth Daily (before breakfast). MIRALAX 17 gram/dose powder Generic drug:  polyethylene glycol Take 17 g by mouth daily as needed (constipation). Indications: constipation  
  
 nystatin 100,000 unit/mL suspension Commonly known as:  MYCOSTATIN Take 5 mL by mouth four (4) times daily. oxyCODONE IR 5 mg immediate release tablet Commonly known as:  Patrick Pack Take 1-2 tablets PO every 4 to 6 hours prn post-operative pain  
  
 promethazine 25 mg tablet Commonly known as:  PHENERGAN Take 1 Tab by mouth every six (6) hours as needed for Nausea. RELPAX 40 mg tablet Generic drug:  eletriptan TAKE 1 TABLET BY MOUTH ONCE AS NEEDED. MAY REPEAT IN 2 HOURS IF NEEDED * topiramate 200 mg tablet Commonly known as:  TOPAMAX Take 1 Tab by mouth daily. Indications: MIGRAINE PREVENTION  
  
 * topiramate 200 mg tablet Commonly known as:  TOPAMAX Take 2 Tabs by mouth every evening. * Notice: This list has 2 medication(s) that are the same as other medications prescribed for you. Read the directions carefully, and ask your doctor or other care provider to review them with you. Prescriptions Sent to Pharmacy Refills RELPAX 40 mg tablet 5 Sig: TAKE 1 TABLET BY MOUTH ONCE AS NEEDED. MAY REPEAT IN 2 HOURS IF NEEDED Class: Normal  
 Pharmacy: [address] [place] [address] Ph #: [phone] topiramate (TOPAMAX) 200 mg tablet 1 Sig: Take 1 Tab by mouth daily. Indications: MIGRAINE PREVENTION Class: Normal  
 Pharmacy: [address] [place] [address] Ph #: [phone]  Route: Oral  
 topiramate (TOPAMAX) 200 mg tablet 1 Sig: Take 2 Tabs by mouth every evening. Class: Normal  
 Pharmacy: [address] [place] [address] #: [phone] Route: Oral  
  
Follow-up Instructions Return in about 6 months (around 11/8/2018). To-Do List   
 05/09/2018 6:00 PM  
  Appointment with Arnoldo Mack PT at [address] [facility]  
  
 05/10/2018 8:00 AM  
  [facility] [address] [facility] Patient Instructions PRESCRIPTION REFILL POLICY [facility] Statement to Patients April 1, 2014 In an effort to ensure the large volume of patient prescription refills is processed in the most efficient and expeditious manner, we are asking our patients to assist us by calling your Pharmacy for all prescription refills, this will include also your  Mail Order Pharmacy. The pharmacy will contact our office electronically to continue the refill process. Please do not wait until the last minute to call your pharmacy. We need at least 48 hours (2days) to fill prescriptions. We also encourage you to call your pharmacy before going to  your prescription to make sure it is ready. With regard to controlled substance prescription refill requests (narcotic refills) that need to be picked up at our office, we ask your cooperation by providing us with at least 72 hours (3days) notice that you will need a refill. We will not refill narcotic prescription refill requests after 4:00pm on any weekday, Monday through Thursday, or after 2:00pm on Fridays, or on the weekends. We encourage everyone to explore another way of getting your prescription refill request processed using [employer], our patient web portal through our electronic medical record system.  Mobile-XLt is an efficient and effective way to communicate your medication request directly to the office and  downloadable as an ashleigh on your smart phone . [employer] also features a review functionality that allows you to view your medication list as well as leave messages for your physician. Are you ready to get connected? If so please review the attatched instructions or speak to any of our staff to get you set up right away! Thank you so much for your cooperation. Should you have any questions please contact our Practice Administrator. The Physicians and Staff,  [address] [place] [address] [facility] [address] What is a living will? A living will is a legal form you use to write down the kind of care you want at the end of your life. It is used by the health professionals who will treat you if you aren't able to decide for yourself. If you put your wishes in writing, your loved ones and others will know what kind of care you want. They won't need to guess. This can ease your mind and be helpful to others. A living will is not the same as an estate or property will. An estate will explains what you want to happen with your money and property after you die. Is a living will a legal document? A living will is a legal document. Each state has its own laws about living christine. If you move to another state, make sure that your living will is legal in the state where you now live. Or you might use a universal form that has been approved by many states. This kind of form can sometimes be completed and stored online. Your electronic copy will then be available wherever you have a connection to the Internet. In most cases, doctors will respect your wishes even if you have a form from a different state. · You don't need an  to complete a living will. But legal advice can be helpful if your state's laws are unclear, your health history is complicated, or your family can't agree on what should be in your living will. · You can change your living will at any time. Some people find that their wishes about end-of-life care change as their health changes. · In addition to making a living will, think about completing a medical power of  form. This form lets you name the person you want to make end-of-life treatment decisions for you (your \"health care agent\") if you're not able to. Many hospitals and nursing homes will give you the forms you need to complete a living will and a medical power of . · Your living will is used only if you can't make or communicate decisions for yourself anymore. If you become able to make decisions again, you can accept or refuse any treatment, no matter what you wrote in your living will. · Your state may offer an online registry. This is a place where you can store your living will online so the doctors and nurses who need to treat you can find it right away. What should you think about when creating a living will? Talk about your end-of-life wishes with your family members and your doctor. Let them know what you want. That way the people making decisions for you won't be surprised by your choices. Think about these questions as you make your living will: · Do you know enough about life support methods that might be used? If not, talk to your doctor so you know what might be done if you can't breathe on your own, your heart stops, or you're unable to swallow. · What things would you still want to be able to do after you receive life-support methods? Would you want to be able to walk? To speak? To eat on your own? To live without the help of machines? · If you have a choice, where do you want to be cared for? In your home? At a hospital or nursing home? · Do you want certain Jain practices performed if you become very ill? · If you have a choice at the end of your life, where would you prefer to die? At home? In a hospital or nursing home? Somewhere else? · Would you prefer to be buried or cremated? · Do you want your organs to be donated after you die? What should you do with your living will? · Make sure that your family members and your health care agent have copies of your living will. · Give your doctor a copy of your living will to keep in your medical record. If you have more than one doctor, make sure that each one has a copy. · You may want to put a copy of your living will where it can be easily found. Where can you learn more? Go to http://shashank-elías.info/. Enter T300 in the search box to learn more about \"Learning About Living Chrystal Guerra. \" Current as of: September 24, 2016 Content Version: 11.4 © 8460-4814 [employer] Care instructions adapted under license by [employer]) (which disclaims liability or warranty for this information). If you have questions about a medical condition or this instruction, always ask your healthcare professional. [place] [address] any warranty or liability for your use of this information. Advance Directives: Care Instructions Your Care Instructions An advance directive is a legal way to state your wishes at the end of your life. It tells your family and your doctor what to do if you can no longer say what you want. There are two main types of advance directives. You can change them any time that your wishes change. · A living will tells your family and your doctor your wishes about life support and other treatment. · A durable power of  for health care lets you name a person to make treatment decisions for you when you can't speak for yourself. This person is called a health care agent. If you do not have an advance directive, decisions about your medical care may be made by a doctor or a  who doesn't know you.  
It may help to think of an advance directive as a gift to the people who care for you. If you have one, they won't have to make tough decisions by themselves. Follow-up care is a key part of your treatment and safety. Be sure to make and go to all appointments, and call your doctor if you are having problems. It's also a good idea to know your test results and keep a list of the medicines you take. How can you care for yourself at home? · Discuss your wishes with your loved ones and your doctor. This way, there are no surprises. · Many states have a unique form. Or you might use a universal form that has been approved by many states. This kind of form can sometimes be completed and stored online. Your electronic copy will then be available wherever you have a connection to the Internet. In most cases, doctors will respect your wishes even if you have a form from a different state. · You don't need a  to do an advance directive. But you may want to get legal advice. · Think about these questions when you prepare an advance directive: ¨ Who do you want to make decisions about your medical care if you are not able to? Many people choose a family member or close friend. ¨ Do you know enough about life support methods that might be used? If not, talk to your doctor so you understand. ¨ What are you most afraid of that might happen? You might be afraid of having pain, losing your independence, or being kept alive by machines. ¨ Where would you prefer to die? Choices include your home, a hospital, or a nursing home. ¨ Would you like to have information about hospice care to support you and your family? ¨ Do you want to donate organs when you die? ¨ Do you want certain Shinto practices performed before you die? If so, put your wishes in the advance directive. · Read your advance directive every year, and make changes as needed. When should you call for help? Be sure to contact your doctor if you have any questions. Where can you learn more? Go to http://shashank-elías.info/. Enter R264 in the search box to learn more about \"Advance Directives: Care Instructions. \" Current as of: September 24, 2016 Content Version: 11.4 © 5676-4698 [employer]. Care instructions adapted under license by [employer] (which disclaims liability or warranty for this information). If you have questions about a medical condition or this instruction, always ask your healthcare professional. [address] any warranty or liability for your use of this information. Patient history reviewed and patient examined. Will renew meds by request and hopefully sometime this year perhaps 1 of the CGRP antagonists will be approved by the FDA and would be a consideration. Introducing \[facility]\"" & [facility]! Dear Annalee Lagunas: 
Thank you for requesting a [facility] account. Our records indicate that you already have an active [facility] account. You can access your account anytime at [email or web address]. [employer] Did you know that you can access your hospital and ER discharge instructions at any time in [facility]? You can also review all of your test results from your hospital stay or ER visit. Additional Information If you have questions, please visit the Frequently Asked Questions section of the [facility] website at [email or web address] [employer]/. Remember, [facility] is NOT to be used for urgent needs. For medical emergencies, dial 911. Now available from your iPhone and Android! Please provide this summary of care documentation to your next provider. Your primary care clinician is listed as Dionte Mensah. If you have any questions after today's visit, please call [phone].

## 2018-05-08 NOTE — PROGRESS NOTES
Chief Complaint   Patient presents with    Head Pain     Reviewed record in preparation for visit and have necessary documentation  Pt did not bring medication to office visit for review  Medication list reviewed and reconciled with patient  Information was given to pt on Advanced Directives, Living Will  opportunity was given for questions

## 2018-05-09 ENCOUNTER — HOME CARE VISIT (OUTPATIENT)
Dept: SCHEDULING | Facility: HOME HEALTH | Age: 48
End: 2018-05-09
Payer: COMMERCIAL

## 2018-05-09 VITALS
SYSTOLIC BLOOD PRESSURE: 118 MMHG | OXYGEN SATURATION: 98 % | HEART RATE: 80 BPM | TEMPERATURE: 98.6 F | RESPIRATION RATE: 16 BRPM | DIASTOLIC BLOOD PRESSURE: 80 MMHG

## 2018-05-09 PROCEDURE — G0151 HHCP-SERV OF PT,EA 15 MIN: HCPCS

## 2018-05-09 RX ORDER — TOPIRAMATE 200 MG/1
TABLET ORAL
Qty: 75 TAB | Refills: 3 | Status: SHIPPED | OUTPATIENT
Start: 2018-05-09 | End: 2018-06-01 | Stop reason: SDUPTHER

## 2018-05-09 NOTE — TELEPHONE ENCOUNTER
----- Message from Indigo Nevarez sent at 5/9/2018 12:02 PM EDT -----  Regarding: Dr. Bhargav Mccallum  Pt called concerning the way that the prescription was written and the patient would like a call back. Pt best contact number [phone].

## 2018-05-09 NOTE — TELEPHONE ENCOUNTER
Patient states that she received 2 Rx for topiramate yesterday and the insurance will not pay for both  The way it is written. Patient reports that her ongoing dose has been topiramate 200mg QAM and 300mg QPM.  Patient requesting that it be written like that.   Dr. Laymond Apley made aware of this request.

## 2018-05-11 ENCOUNTER — HOME CARE VISIT (OUTPATIENT)
Dept: SCHEDULING | Facility: HOME HEALTH | Age: 48
End: 2018-05-11
Payer: COMMERCIAL

## 2018-05-11 ENCOUNTER — TELEPHONE (OUTPATIENT)
Dept: NEUROLOGY | Age: 48
End: 2018-05-11

## 2018-05-11 PROCEDURE — G0299 HHS/HOSPICE OF RN EA 15 MIN: HCPCS

## 2018-05-11 NOTE — TELEPHONE ENCOUNTER
[facility] pharmacy called and said the patient said she is taking a different dosage frequency of Topirimate than what's prescribed and she will run out of medication causing the insurance to say it's too soon to fill. They wanted to know if they can receive a new prescription to fix the issue.

## 2018-05-13 VITALS
SYSTOLIC BLOOD PRESSURE: 118 MMHG | TEMPERATURE: 97 F | DIASTOLIC BLOOD PRESSURE: 70 MMHG | HEART RATE: 73 BPM | OXYGEN SATURATION: 99 % | RESPIRATION RATE: 18 BRPM

## 2018-06-01 ENCOUNTER — TELEPHONE (OUTPATIENT)
Dept: NEUROLOGY | Age: 48
End: 2018-06-01

## 2018-06-01 DIAGNOSIS — G43.009 MIGRAINE WITHOUT AURA AND WITHOUT STATUS MIGRAINOSUS, NOT INTRACTABLE: ICD-10-CM

## 2018-06-01 RX ORDER — TOPIRAMATE 200 MG/1
TABLET ORAL
Qty: 225 TAB | Refills: 1 | Status: SHIPPED | OUTPATIENT
Start: 2018-06-01 | End: 2018-12-20 | Stop reason: SDUPTHER

## 2018-06-01 RX ORDER — ELETRIPTAN HYDROBROMIDE 40 MG/1
TABLET, FILM COATED ORAL
Qty: 27 TAB | Refills: 1 | Status: SHIPPED | OUTPATIENT
Start: 2018-06-01 | End: 2019-01-02 | Stop reason: SDUPTHER

## 2018-06-01 NOTE — TELEPHONE ENCOUNTER
----- Message from Octavia Jacobo sent at 6/1/2018 12:08 PM EDT -----  Regarding: Dr. Anoop Amezcua  Pt requested a call from the nurse regarding a recent  Rx(\"Relpax\") . Best contact number [phone].

## 2018-08-20 ENCOUNTER — TELEPHONE (OUTPATIENT)
Dept: INTERNAL MEDICINE CLINIC | Age: 48
End: 2018-08-20

## 2018-08-20 NOTE — TELEPHONE ENCOUNTER
Patient reports bilateral leg swelling. She was given an appointment on 8/24 with Dr. Ambika Cruz due to PCP out of office. Patient stated was offered an appointment with NP this morning and refused. Patient denied appointment with Ana María Curry NP. She stated it's not an appropriate appointment for the Nurse Practitioner and needs a little bit more expertise. urgent care or emergency room was recommended and she stated that she doesn't know if her problem is urgent. She didn't understand why she is isn't able to see Dr. Aden Hillman. Patient told he's on vacation and will be back on the 24th but doesn't have any openings. Again I offered her an appointment with Dr. Apple Began on the 24th that she didn't accept. She stated that her swelling has been on going since her surgery a few months ago due to medication and has since stopped taking it. Now the swelling is really gross with numbness in her toes. She asked if she switched providers if she could get in to see them easier than seeing Dr. Aden Hillman. Patient informed each provider is different and I can't speak for them and their schedule. She can be worked in with her provider but referred to urgent care/ER if her symptoms warrant the type of evaluation that would be better provided there. She asked if I thought she needed to go and was told being that she's worried about blood clots then yes. Her  and sister sees Dr. Aden Hillman and has no problem getting in. She doesn't understand why she can't seem to get in to see him. Again the patient was informed that he is on vacation. She had a side conversation with her  about not getting because Dr. Aden Hillman isn't here. She returned to phone and said Ty Roque thank you for your help\" and ended the call.

## 2018-08-31 ENCOUNTER — OFFICE VISIT (OUTPATIENT)
Dept: INTERNAL MEDICINE CLINIC | Age: 48
End: 2018-08-31

## 2018-08-31 VITALS
BODY MASS INDEX: 35.65 KG/M2 | TEMPERATURE: 99.5 F | OXYGEN SATURATION: 99 % | RESPIRATION RATE: 18 BRPM | SYSTOLIC BLOOD PRESSURE: 114 MMHG | HEIGHT: 70 IN | HEART RATE: 81 BPM | WEIGHT: 249 LBS | DIASTOLIC BLOOD PRESSURE: 79 MMHG

## 2018-08-31 DIAGNOSIS — R79.89 ELEVATED LFTS: ICD-10-CM

## 2018-08-31 DIAGNOSIS — Z00.00 PREVENTATIVE HEALTH CARE: Primary | ICD-10-CM

## 2018-08-31 DIAGNOSIS — I87.2 VENOUS INSUFFICIENCY: ICD-10-CM

## 2018-08-31 DIAGNOSIS — R60.0 BILATERAL LOWER EXTREMITY EDEMA: ICD-10-CM

## 2018-08-31 DIAGNOSIS — E66.9 OBESITY (BMI 30-39.9): ICD-10-CM

## 2018-08-31 PROBLEM — E66.01 SEVERE OBESITY (BMI 35.0-39.9): Status: ACTIVE | Noted: 2018-08-31

## 2018-08-31 RX ORDER — HYDROCODONE BITARTRATE AND ACETAMINOPHEN 5; 325 MG/1; MG/1
1 TABLET ORAL
COMMUNITY
End: 2018-08-31 | Stop reason: ALTCHOICE

## 2018-08-31 NOTE — PATIENT INSTRUCTIONS

## 2018-08-31 NOTE — MR AVS SNAPSHOT
[facility] 
 
 
 [address] [place] [address] [facility] 
[phone] Patient: Devan Maher MRN: [id number] CIT:[birth year] Visit Information Date & Time Provider Department Dept. Phone Encounter #  
 8/31/2018  9:15 AM Aida Dent MD [facility] [address] [facility] [id number] Your Appointments 11/2/2018  1:00 PM  
Follow Up with Mi Yang MD  
[facility]) Appt Note: 6 month follow up [place] [address] [place] [address] [facility] [id number] [phone]  
  
   
 [place] [address] [id number] [address] Upcoming Health Maintenance Date Due  
 PAP AKA CERVICAL CYTOLOGY 5/1/1992 Influenza Age 5 to Adult 8/1/2018 DTaP/Tdap/Td series (2 - Td) 9/5/2022 Allergies as of 8/31/2018  Review Complete On: 8/31/2018 By: Aida Dent MD  
  
 Severity Noted Reaction Type Reactions Topamax [Topiramate]  12/06/2010    Other (comments) Low grade fever Only with brand name ok with generic Current Immunizations  Reviewed on 8/31/2018 Name Date Influenza Vaccine (Quad) PF 12/7/2017 TDAP Vaccine 9/5/2012 Reviewed by Aida Dent MD on 8/31/2018 at  9:51 AM  
You Were Diagnosed With   
  
 Codes Comments Preventative health care    -  Primary ICD-10-CM: Z00.00 ICD-9-CM: V70.0 Elevated LFTs     ICD-10-CM: R79.89 ICD-9-CM: 790.6 Bilateral lower extremity edema     ICD-10-CM: R60.0 ICD-9-CM: 638. 3 Obesity (BMI 30-39. 9)     ICD-10-CM: E66.9 ICD-9-CM: 278.00 Vitals BP Pulse Temp Resp Height(growth percentile) Weight(growth percentile) 114/79 (BP 1 Location: Left arm, BP Patient Position: Sitting) 81 99.5 °F (37.5 °C) (Oral) 18 5' 10\" (1.778 m) 249 lb (112.9 kg) LMP SpO2 BMI OB Status Smoking Status 08/13/2018 99% 35.73 kg/m2 Having regular periods Never Smoker Vitals History BMI and BSA Data Body Mass Index Body Surface Area 35.73 kg/m 2 2.36 m 2 Preferred Pharmacy Pharmacy Name Phone [id number] [place], [address] AT [employer] [phone] Your Updated Medication List  
  
   
This list is accurate as of 8/31/18 10:10 AM.  Always use your most recent med list.  
  
  
  
  
 ferrous sulfate 325 mg (65 mg iron) tablet Take 325 mg by mouth Daily (before breakfast). RELPAX 40 mg tablet Generic drug:  eletriptan TAKE 1 TABLET BY MOUTH ONCE AS NEEDED. MAY REPEAT IN 2 HOURS IF NEEDED  
  
 topiramate 200 mg tablet Commonly known as:  TOPAMAX  
200 mg po QAM, and 300 mg po QPM  Indications: MIGRAINE PREVENTION We Performed the Following HEPATIC FUNCTION PANEL [63009 CPT(R)] LIPID PANEL [76141 CPT(R)] METABOLIC PANEL, BASIC [03628 CPT(R)] REFERRAL TO WEIGHT LOSS [TKP130 Custom] URINALYSIS W/ RFLX MICROSCOPIC [23790 CPT(R)] To-Do List   
 09/14/2018 Imaging:  [facility] MAMMO BI SCREENING INCL CAD Referral Information Referral ID Referred By Referred To  
  
 [id number] RON[facility], [address] [place] [address] Mya Julio MD   
   [facility], [address] [place] [address] Street [facility] Phone: [phone] Fax: [phone] Visits Status Start Date End Date 1 New Request 8/31/18 8/31/19 If your referral has a status of pending review or denied, additional information will be sent to support the outcome of this decision. Patient Instructions Well Visit, Ages 25 to 48: Care Instructions Your Care Instructions Physical exams can help you stay healthy. Your doctor has checked your overall health and may have suggested ways to take good care of yourself. He or she also may have recommended tests. At home, you can help prevent illness with healthy eating, regular exercise, and other steps. Follow-up care is a key part of your treatment and safety. Be sure to make and go to all appointments, and call your doctor if you are having problems. It's also a good idea to know your test results and keep a list of the medicines you take. How can you care for yourself at home? · Reach and stay at a healthy weight. This will lower your risk for many problems, such as obesity, diabetes, heart disease, and high blood pressure. · Get at least 30 minutes of physical activity on most days of the week. Walking is a good choice. You also may want to do other activities, such as running, swimming, cycling, or playing tennis or team sports. Discuss any changes in your exercise program with your doctor. · Do not smoke or allow others to smoke around you. If you need help quitting, talk to your doctor about stop-smoking programs and medicines. These can increase your chances of quitting for good. · Talk to your doctor about whether you have any risk factors for sexually transmitted infections (STIs). Having one sex partner (who does not have STIs and does not have sex with anyone else) is a good way to avoid these infections. · Use birth control if you do not want to have children at this time. Talk with your doctor about the choices available and what might be best for you. · Protect your skin from too much sun. When you're outdoors from 10 a.m. to 4 p.m., stay in the shade or cover up with clothing and a hat with a wide brim. Wear sunglasses that block UV rays. Even when it's cloudy, put broad-spectrum sunscreen (SPF 30 or higher) on any exposed skin. · See a dentist one or two times a year for checkups and to have your teeth cleaned. · Wear a seat belt in the car. · Drink alcohol in moderation, if at all. That means no more than 2 drinks a day for men and 1 drink a day for women. Follow your doctor's advice about when to have certain tests. These tests can spot problems early. For everyone · Cholesterol. Have the fat (cholesterol) in your blood tested after age 21. Your doctor will tell you how often to have this done based on your age, family history, or other things that can increase your risk for heart disease. · Blood pressure. Have your blood pressure checked during a routine doctor visit. Your doctor will tell you how often to check your blood pressure based on your age, your blood pressure results, and other factors. · Vision. Talk with your doctor about how often to have a glaucoma test. 
· Diabetes. Ask your doctor whether you should have tests for diabetes. · Colon cancer. Have a test for colon cancer at age 48. You may have one of several tests. If you are younger than 48, you may need a test earlier if you have any risk factors. Risk factors include whether you already had a precancerous polyp removed from your colon or whether your parent, brother, sister, or child has had colon cancer. For women · Breast exam and mammogram. Talk to your doctor about when you should have a clinical breast exam and a mammogram. Medical experts differ on whether and how often women under 50 should have these tests. Your doctor can help you decide what is right for you. · Pap test and pelvic exam. Begin Pap tests at age 24. A Pap test is the best way to find cervical cancer. The test often is part of a pelvic exam. Ask how often to have this test. 
· Tests for sexually transmitted infections (STIs). Ask whether you should have tests for STIs. You may be at risk if you have sex with more than one person, especially if your partners do not wear condoms. For men · Tests for sexually transmitted infections (STIs). Ask whether you should have tests for STIs. You may be at risk if you have sex with more than one person, especially if you do not wear a condom. · Testicular cancer exam. Ask your doctor whether you should check your testicles regularly. · Prostate exam. Talk to your doctor about whether you should have a blood test (called a PSA test) for prostate cancer. Experts differ on whether and when men should have this test. Some experts suggest it if you are older than 39 and are -American or have a father or brother who got prostate cancer when he was younger than 72. When should you call for help? Watch closely for changes in your health, and be sure to contact your doctor if you have any problems or symptoms that concern you. Where can you learn more? Go to http://shashank-elías.info/. Enter P072 in the search box to learn more about \"Well Visit, Ages 25 to 48: Care Instructions. \" Current as of: May 16, 2017 Content Version: 11.7 © 3661-0165 [employer]. Care instructions adapted under license by [employer] (which disclaims liability or warranty for this information). If you have questions about a medical condition or this instruction, always ask your healthcare professional. [place] [address] any warranty or liability for your use of this information. Introducing [facility]! Dear Merlin Candelario: 
Thank you for requesting a [employer] account. Our records indicate that you already have an active [employer] account. You can access your account anytime at [email or web address]. [employer] Did you know that you can access your hospital and ER discharge instructions at any time in [employer]? You can also review all of your test results from your hospital stay or ER visit. Additional Information If you have questions, please visit the Frequently Asked Questions section of the [employer] website at https://[employer]. [employer]/. Remember, [employer] is NOT to be used for urgent needs. For medical emergencies, dial 911. Now available from your iPhone and Android! Please provide this summary of care documentation to your next provider. Your primary care clinician is listed as Yue Freedman. If you have any questions after today's visit, please call [phone].

## 2018-08-31 NOTE — PROGRESS NOTES
HISTORY OF PRESENT ILLNESS  Maki Vázquez is a 53 y.o. female. HPI  Denver Bliss is here for complete health maintenance physical exam and screening. she does have other concerns. She is trying unsuccessfully to lose wt. She has done [employer] and other wt loss plans without longterm success. She is asking about medication to lose wt. She is on high dose topamax for Migraine prophylaxis but has not seen wt loss. Diet has been very good , low calorie and minimal portions per pt and . Thyroid normal 8 months ago.  does note she snores mildly initially with sleep but no apneic episodes. Bilateral LE edema L>R since spine surgery. Worse over weekend when more active. She was evaluated in ER for this and venous dopplers reportedly negative for DVT's. Labs were done. No results available to me. [facility] survey given to pt to complete. Results as follows:  Snoring: YES  Tired: NO  Observed: NO  Pressure: NO  BMI >35: YES  Age >50: NO  Neck (>17, >16): NO  -measured 15 in  Gender Male: NO    Score:  2 / 8   (High risk if > 3)      Health maintenance hx includes:  Exercise: moderately active. Form of exercise: walking   Diet: generally follows a low fat low cholesterol diet, exercises regularly, nonsmoker    Cancer screening:       Breast cancer screening: last mammogram 2015 and   was normal   Cervical cancer screening: last PAP/Pelvic exam: 2016 ?   and was normal per pt - Dr. Chantell Mosley      Lab Results   Component Value Date/Time    Cholesterol, total 222 (H) 01/09/2015 10:15 AM    HDL Cholesterol 42 01/09/2015 10:15 AM    LDL, calculated 162 (H) 01/09/2015 10:15 AM    VLDL, calculated 18 01/09/2015 10:15 AM    Triglyceride 88 01/09/2015 10:15 AM    CHOL/HDL Ratio 5.0 10/29/2009 11:18 AM         Lab Results   Component Value Date/Time    Glucose 89 05/05/2018 03:03 PM    Glucose (POC) 95 08/30/2015 12:52 AM       Immunizations:     Immunization History   Administered Date(s) Administered   [facility] Influenza Vaccine (Quad) PF 2017    TDAP Vaccine 2012      Immunization status: up to date and documented. Social History     Social History    Marital status:      Spouse name: N/A    Number of children: N/A    Years of education: N/A     Occupational History    Not on file. Social History Main Topics    Smoking status: Never Smoker    Smokeless tobacco: Never Used    Alcohol use 0.6 oz/week     1 Glasses of wine per week      Comment: x1 a month    Drug use: No    Sexual activity: Yes     Partners: Male     Birth control/ protection: Surgical     Other Topics Concern    Not on file     Social History Narrative     Past Surgical History:   Procedure Laterality Date    HX  SECTION      HX LUMBAR DISKECTOMY      L 5 S1    HX LUMBAR FUSION N/A 2018    L4-S1 ALIF    HX TUBAL LIGATION       Family History   Problem Relation Age of Onset    Suicide Father    [facility] Depression Father     Depression Mother     Other Sister      Kidney Issues    No Known Problems Sister     No Known Problems Sister     Hypertension Maternal Grandfather     Diabetes Maternal Grandfather     Cancer Paternal Grandmother      lung     Current Outpatient Prescriptions on File Prior to Visit   Medication Sig Dispense Refill    RELPAX 40 mg tablet TAKE 1 TABLET BY MOUTH ONCE AS NEEDED. MAY REPEAT IN 2 HOURS IF NEEDED 27 Tab 1    topiramate (TOPAMAX) 200 mg tablet 200 mg po QAM, and 300 mg po QPM  Indications: MIGRAINE PREVENTION 225 Tab 1    ferrous sulfate 325 mg (65 mg iron) tablet Take 325 mg by mouth Daily (before breakfast). No current facility-administered medications on file prior to visit. .    Review of Systems   Constitutional: Negative for malaise/fatigue and weight loss. HENT: Negative. Eyes: Negative. Negative for blurred vision. Respiratory: Negative for cough, shortness of breath and wheezing. Cardiovascular: Positive for leg swelling.  Negative for chest pain and palpitations. Gastrointestinal: Negative for abdominal pain, blood in stool, constipation, diarrhea, heartburn, nausea and vomiting. Genitourinary: Negative for dysuria and hematuria. Musculoskeletal: Negative for back pain and joint pain. Skin: Negative for rash. Neurological: Negative for dizziness. Endo/Heme/Allergies: Negative for environmental allergies. Does not bruise/bleed easily. Psychiatric/Behavioral: Negative for depression and substance abuse. The patient is not nervous/anxious and does not have insomnia. Physical Exam   Constitutional: She is oriented to person, place, and time. She appears well-developed and well-nourished. No distress. /79 (BP 1 Location: Left arm, BP Patient Position: Sitting)  Pulse 81  Temp 99.5 °F (37.5 °C) (Oral)   Resp 18  Ht 5' 10\" (1.778 m)  Wt 249 lb (112.9 kg)  LMP 08/13/2018  SpO2 99%  BMI 35.73 kg/m2Body mass index is 35.73 kg/(m^2). HENT:   Head: Normocephalic. Right Ear: Hearing, tympanic membrane and ear canal normal. No decreased hearing is noted. Left Ear: Hearing, tympanic membrane and ear canal normal. No decreased hearing is noted. Nose: Nose normal.   Mouth/Throat: Oropharynx is clear and moist and mucous membranes are normal. Normal dentition. No oropharyngeal exudate. Eyes: Conjunctivae and lids are normal. Pupils are equal, round, and reactive to light. No scleral icterus. Neck: Trachea normal and normal range of motion. Neck supple. No thyromegaly present. Cardiovascular: Normal rate, regular rhythm, normal heart sounds and intact distal pulses. No murmur heard. Pulmonary/Chest: Effort normal and breath sounds normal.   Abdominal: Soft. Normal appearance and bowel sounds are normal. She exhibits no distension and no mass. There is no hepatosplenomegaly. There is no tenderness. Musculoskeletal: Normal range of motion. She exhibits no edema or tenderness.    asymmetric nonpitting edema L>R with increased varicose vns on L c/w R. No erythema, warmth, skin breaks. Lymphadenopathy:     She has no cervical adenopathy. Neurological: She is alert and oriented to person, place, and time. Skin: Skin is warm and dry. No rash noted. She is not diaphoretic. Psychiatric: She has a normal mood and affect. Her speech is normal and behavior is normal. Judgment and thought content normal. Cognition and memory are normal.   Nursing note and vitals reviewed. ASSESSMENT and PLAN  Diagnoses and all orders for this visit:    1. Preventative health care  -     [facility] MAMMO BI SCREENING INCL CAD; Future  -     LIPID PANEL  Follow up with GYN yearly for routine screening. Katia Serrano was counseled on age-appropriate/ guideline-based risk prevention behaviors and screening for a 53y.o. year old   female . We also discussed adjustments in screening based on family history if necessary. Printed instructions for preventative screening guidelines and healthy behaviors given to patient with after visit summary. 2. Elevated LFTs  Following spine surgery. Recheck now to confirm resolution  Lab Results   Component Value Date/Time    ALT (SGPT) 79 (H) 05/05/2018 03:03 PM    AST (SGOT) 20 05/05/2018 03:03 PM    Alk. phosphatase 128 (H) 05/05/2018 03:03 PM    Bilirubin, total 0.1 (L) 05/05/2018 03:03 PM       -     HEPATIC FUNCTION PANEL    3. Bilateral lower extremity edema - vs lymphedema or venous insufficiency. Recheck renal function. Consider compression hose. Long term - wt loss. -     METABOLIC PANEL, BASIC  -     URINALYSIS W/ RFLX MICROSCOPIC    4. Obesity (BMI 30-39.9) - refer for wt loss management. She may benefit from medication management with lifestyle modification.  -     REFERRAL TO WEIGHT LOSS    5. Venous insufficiency -compression hose.       Follow-up Disposition: Not on File

## 2018-09-01 LAB
ALBUMIN SERPL-MCNC: 4.2 G/DL (ref 3.5–5.5)
ALP SERPL-CCNC: 93 IU/L (ref 39–117)
ALT SERPL-CCNC: 12 IU/L (ref 0–32)
APPEARANCE UR: CLEAR
AST SERPL-CCNC: 11 IU/L (ref 0–40)
BACTERIA #/AREA URNS HPF: ABNORMAL /[HPF]
BILIRUB DIRECT SERPL-MCNC: 0.07 MG/DL (ref 0–0.4)
BILIRUB SERPL-MCNC: <0.2 MG/DL (ref 0–1.2)
BILIRUB UR QL STRIP: NEGATIVE
BUN SERPL-MCNC: 12 MG/DL (ref 6–24)
BUN/CREAT SERPL: 15 (ref 9–23)
CALCIUM SERPL-MCNC: 8.6 MG/DL (ref 8.7–10.2)
CASTS URNS QL MICRO: ABNORMAL /LPF
CHLORIDE SERPL-SCNC: 109 MMOL/L (ref 96–106)
CHOLEST SERPL-MCNC: 206 MG/DL (ref 100–199)
CO2 SERPL-SCNC: 18 MMOL/L (ref 20–29)
COLOR UR: YELLOW
CREAT SERPL-MCNC: 0.78 MG/DL (ref 0.57–1)
EPI CELLS #/AREA URNS HPF: >10 /HPF
GLUCOSE SERPL-MCNC: 86 MG/DL (ref 65–99)
GLUCOSE UR QL: NEGATIVE
HDLC SERPL-MCNC: 40 MG/DL
HGB UR QL STRIP: NEGATIVE
INTERPRETATION, 910389: NORMAL
KETONES UR QL STRIP: NEGATIVE
LDLC SERPL CALC-MCNC: 140 MG/DL (ref 0–99)
LEUKOCYTE ESTERASE UR QL STRIP: ABNORMAL
MICRO URNS: ABNORMAL
MUCOUS THREADS URNS QL MICRO: PRESENT
NITRITE UR QL STRIP: NEGATIVE
PH UR STRIP: 5 [PH] (ref 5–7.5)
POTASSIUM SERPL-SCNC: 4.1 MMOL/L (ref 3.5–5.2)
PROT SERPL-MCNC: 6.5 G/DL (ref 6–8.5)
PROT UR QL STRIP: NEGATIVE
RBC #/AREA URNS HPF: ABNORMAL /HPF
SODIUM SERPL-SCNC: 138 MMOL/L (ref 134–144)
SP GR UR: 1.02 (ref 1–1.03)
TRIGL SERPL-MCNC: 131 MG/DL (ref 0–149)
UROBILINOGEN UR STRIP-MCNC: 0.2 MG/DL (ref 0.2–1)
VLDLC SERPL CALC-MCNC: 26 MG/DL (ref 5–40)
WBC #/AREA URNS HPF: ABNORMAL /HPF

## 2018-11-02 ENCOUNTER — OFFICE VISIT (OUTPATIENT)
Dept: NEUROLOGY | Age: 48
End: 2018-11-02

## 2018-11-02 VITALS
SYSTOLIC BLOOD PRESSURE: 122 MMHG | HEART RATE: 73 BPM | BODY MASS INDEX: 36.52 KG/M2 | OXYGEN SATURATION: 98 % | RESPIRATION RATE: 18 BRPM | WEIGHT: 255.1 LBS | DIASTOLIC BLOOD PRESSURE: 76 MMHG | HEIGHT: 70 IN

## 2018-11-02 DIAGNOSIS — G43.009 MIGRAINE WITHOUT AURA AND WITHOUT STATUS MIGRAINOSUS, NOT INTRACTABLE: Primary | ICD-10-CM
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
## 2018-11-02 NOTE — PATIENT INSTRUCTIONS
[facility]   Statement to Patients  April 1, 2014      In an effort to ensure the large volume of patient prescription refills is processed in the most efficient and expeditious manner, we are asking our patients to assist us by calling your Pharmacy for all prescription refills, this will include also your  Mail Order Pharmacy. The pharmacy will contact our office electronically to continue the refill process. Please do not wait until the last minute to call your pharmacy. We need at least 48 hours (2days) to fill prescriptions. We also encourage you to call your pharmacy before going to  your prescription to make sure it is ready. With regard to controlled substance prescription refill requests (narcotic refills) that need to be picked up at our office, we ask your cooperation by providing us with at least 72 hours (3days) notice that you will need a refill. We will not refill narcotic prescription refill requests after 4:00pm on any weekday, Monday through Thursday, or after 2:00pm on Fridays, or on the weekends. We encourage everyone to explore another way of getting your prescription refill request processed using [employer], our patient web portal through our electronic medical record system. [employer] is an efficient and effective way to communicate your medication request directly to the office and  downloadable as an ashleigh on your smart phone . [employer] also features a review functionality that allows you to view your medication list as well as leave messages for your physician. Are you ready to get connected? If so please review the attatched instructions or speak to any of our staff to get you set up right away! Thank you so much for your cooperation. Should you have any questions please contact our Practice Administrator.     The Physicians and Staff,  [facility]                A Healthy Lifestyle: Care Instructions  Your Care Instructions    A healthy lifestyle can help you feel good, stay at a healthy weight, and have plenty of energy for both work and play. A healthy lifestyle is something you can share with your whole family. A healthy lifestyle also can lower your risk for serious health problems, such as high blood pressure, heart disease, and diabetes. You can follow a few steps listed below to improve your health and the health of your family. Follow-up care is a key part of your treatment and safety. Be sure to make and go to all appointments, and call your doctor if you are having problems. It's also a good idea to know your test results and keep a list of the medicines you take. How can you care for yourself at home? · Do not eat too much sugar, fat, or fast foods. You can still have dessert and treats now and then. The goal is moderation. · Start small to improve your eating habits. Pay attention to portion sizes, drink less juice and soda pop, and eat more fruits and vegetables. ? Eat a healthy amount of food. A 3-ounce serving of meat, for example, is about the size of a deck of cards. Fill the rest of your plate with vegetables and whole grains. ? Limit the amount of soda and sports drinks you have every day. Drink more water when you are thirsty. ? Eat at least 5 servings of fruits and vegetables every day. It may seem like a lot, but it is not hard to reach this goal. A serving or helping is 1 piece of fruit, 1 cup of vegetables, or 2 cups of leafy, raw vegetables. Have an apple or some carrot sticks as an afternoon snack instead of a candy bar. Try to have fruits and/or vegetables at every meal.  · Make exercise part of your daily routine. You may want to start with simple activities, such as walking, bicycling, or slow swimming. Try to be active 30 to 60 minutes every day. You do not need to do all 30 to 60 minutes all at once. For example, you can exercise 3 times a day for 10 or 20 minutes.  Moderate exercise is safe for most people, but it is always a good idea to talk to your doctor before starting an exercise program.  · Keep moving. [place] the lawn, work in the garden, or [employer]. Take the stairs instead of the elevator at work. · If you smoke, quit. People who smoke have an increased risk for heart attack, stroke, cancer, and other lung illnesses. Quitting is hard, but there are ways to boost your chance of quitting tobacco for good. ? Use nicotine gum, patches, or lozenges. ? Ask your doctor about stop-smoking programs and medicines. ? Keep trying. In addition to reducing your risk of diseases in the future, you will notice some benefits soon after you stop using tobacco. If you have shortness of breath or asthma symptoms, they will likely get better within a few weeks after you quit. · Limit how much alcohol you drink. Moderate amounts of alcohol (up to 2 drinks a day for men, 1 drink a day for women) are okay. But drinking too much can lead to liver problems, high blood pressure, and other health problems. Family health  If you have a family, there are many things you can do together to improve your health. · Eat meals together as a family as often as possible. · Eat healthy foods. This includes fruits, vegetables, lean meats and dairy, and whole grains. · Include your family in your fitness plan. Most people think of activities such as jogging or tennis as the way to fitness, but there are many ways you and your family can be more active. Anything that makes you breathe hard and gets your heart pumping is exercise. Here are some tips:  ? Walk to do errands or to take your child to school or the bus.  ? Go for a family bike ride after dinner instead of watching TV. Where can you learn more? Go to http://shashank-elías.info/. Enter R652 in the search box to learn more about \"A Healthy Lifestyle: Care Instructions. \"  Current as of: December 7, 2017  Content Version: 11.8  © 4627-2111 Healthwise, [facility]. Care instructions adapted under license by [employer] (which disclaims liability or warranty for this information). If you have questions about a medical condition or this instruction, always ask your healthcare professional. [address]  any warranty or liability for your use of this information. Patient history reviewed and patient examined. Currently working on a 10-day stretch of headaches and wants to see where this goes and I respect that. Informed about newer preventative drug prospects and a Medrol Dosepak for rescue if needed. Will see her back in 6 months and hopefully the weather will settle down as well as her headaches.

## 2018-12-20 RX ORDER — TOPIRAMATE 200 MG/1
TABLET ORAL
Qty: 225 TAB | Refills: 1 | Status: SHIPPED | OUTPATIENT
Start: 2018-12-20 | End: 2019-06-12 | Stop reason: SDUPTHER

## 2019-01-02 ENCOUNTER — TELEPHONE (OUTPATIENT)
Dept: NEUROLOGY | Age: 49
End: 2019-01-02

## 2019-01-02 DIAGNOSIS — G43.009 MIGRAINE WITHOUT AURA AND WITHOUT STATUS MIGRAINOSUS, NOT INTRACTABLE: ICD-10-CM

## 2019-01-02 RX ORDER — ELETRIPTAN HYDROBROMIDE 40 MG/1
TABLET, FILM COATED ORAL
Qty: 27 TAB | Refills: 1 | Status: SHIPPED | OUTPATIENT
Start: 2019-01-02 | End: 2019-01-16 | Stop reason: SDUPTHER

## 2019-01-02 NOTE — TELEPHONE ENCOUNTER
----- Message from King Banerjee sent at 1/2/2019  9:49 AM EST -----  Regarding: /Refill  Pt is requesting a call back in reference to her medication, what pharmacy was the medication called into. Best contact number is [phone].

## 2019-01-16 ENCOUNTER — TELEPHONE (OUTPATIENT)
Dept: NEUROLOGY | Age: 49
End: 2019-01-16

## 2019-01-16 DIAGNOSIS — G43.009 MIGRAINE WITHOUT AURA AND WITHOUT STATUS MIGRAINOSUS, NOT INTRACTABLE: ICD-10-CM

## 2019-01-16 RX ORDER — ELETRIPTAN HYDROBROMIDE 40 MG/1
TABLET, FILM COATED ORAL
Qty: 27 TAB | Refills: 1 | Status: SHIPPED | OUTPATIENT
Start: 2019-01-16 | End: 2019-03-06 | Stop reason: SDUPTHER

## 2019-01-16 NOTE — TELEPHONE ENCOUNTER
----- Message from Heather Morin sent at 1/16/2019  1:52 PM EST -----  Regarding: Dr. Trudy Bowden  Pt is calling to speak with nurse regarding  medication. [phone].

## 2019-03-06 DIAGNOSIS — G43.009 MIGRAINE WITHOUT AURA AND WITHOUT STATUS MIGRAINOSUS, NOT INTRACTABLE: ICD-10-CM

## 2019-03-06 RX ORDER — ELETRIPTAN HYDROBROMIDE 40 MG/1
TABLET, FILM COATED ORAL
Qty: 27 TAB | Refills: 3 | Status: SHIPPED | OUTPATIENT
Start: 2019-03-06 | End: 2020-02-13 | Stop reason: SDUPTHER

## 2019-05-01 ENCOUNTER — OFFICE VISIT (OUTPATIENT)
Dept: NEUROLOGY | Age: 49
End: 2019-05-01

## 2019-05-01 VITALS
SYSTOLIC BLOOD PRESSURE: 122 MMHG | WEIGHT: 255 LBS | OXYGEN SATURATION: 98 % | HEIGHT: 70 IN | RESPIRATION RATE: 20 BRPM | HEART RATE: 70 BPM | DIASTOLIC BLOOD PRESSURE: 82 MMHG | BODY MASS INDEX: 36.51 KG/M2

## 2019-05-01 DIAGNOSIS — G43.009 MIGRAINE WITHOUT AURA AND WITHOUT STATUS MIGRAINOSUS, NOT INTRACTABLE: Primary | ICD-10-CM

## 2019-05-01 NOTE — PROGRESS NOTES
Patient is here for a follow up for migraines. She states that her headaches are about the same. Getting about a migraine about every other week lasting 3 days at a time. She feels that the topamax works well for her. No specific concerns at this time.

## 2019-05-01 NOTE — PROGRESS NOTES
Of neurology Consult      Subjective:      José Miguel Salinas is a 51 y.o. female who returns with chronic migraine headaches. Is doing well on the Relpax 40 mg in the Topamax 500 mg daily. Gets may be a headache a week which is easily manageable and there is no tolerability issues. Once again reminded about the new class of preventative drugs the CGRP antagonists and says she is definitely not an injectable person. Does not mention any new medical or surgical problems exam looks normal and did not need a refill on her medicines today. Revisit 6 months. Current Outpatient Medications   Medication Sig Dispense Refill    RELPAX 40 mg tablet TAKE 1 TABLET BY MOUTH ONCE AS NEEDED. MAY REPEAT IN 2 HOURS IF NEEDED 27 Tab 3    topiramate (TOPAMAX) 200 mg tablet FOR DIRECTIONS ON HOW TO   TAKE THIS MEDICINE, READ   THE ENCLOSED MEDICATION    INFORMATION FORM 225 Tab 1    ferrous sulfate 325 mg (65 mg iron) tablet Take 325 mg by mouth Daily (before breakfast).         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    Migraine     Obesity (BMI 35.0-39.9 without comorbidity) 2018    Stool color black     Because she takes iron      Past Surgical History:   Procedure Laterality Date    HX  SECTION      HX LUMBAR DISKECTOMY      L 5 S1    HX LUMBAR FUSION N/A 2018    L4-S1 ALIF    HX TUBAL LIGATION        Social History     Socioeconomic History    Marital status:      Spouse name: Not on file    Number of children: Not on file    Years of education: Not on file    Highest education level: Not on file   Occupational History    Not on file   Social Needs    Financial resource strain: Not on file    Food insecurity:     Worry: Not on file     Inability: Not on file    Transportation needs:     Medical: Not on file     Non-medical: Not on file   Tobacco Use    Smoking status: Never Smoker    Smokeless tobacco: Never Used   Substance and Sexual Activity    Alcohol use: Yes     Alcohol/week: 0.6 oz     Types: 1 Glasses of wine per week     Comment: x1 a month    Drug use: No    Sexual activity: Yes     Partners: Male     Birth control/protection: Surgical   Lifestyle    Physical activity:     Days per week: Not on file     Minutes per session: Not on file    Stress: Not on file   Relationships    Social connections:     Talks on phone: Not on file     Gets together: Not on file     Attends Christian service: Not on file     Active member of club or organization: Not on file     Attends meetings of clubs or organizations: Not on file     Relationship status: Not on file    Intimate partner violence:     Fear of current or ex partner: Not on file     Emotionally abused: Not on file     Physically abused: Not on file     Forced sexual activity: Not on file   Other Topics Concern    Not on file   Social History Narrative    Not on file      Family History   Problem Relation Age of Onset    Suicide Father     Depression Father     Depression Mother     Other Sister         Kidney Issues    No Known Problems Sister     No Known Problems Sister     Hypertension Maternal Grandfather     Diabetes Maternal Grandfather     Cancer Paternal Grandmother         lung      Visit Vitals  /82 (BP 1 Location: Left arm, BP Patient Position: Sitting)   Pulse 70   Resp 20   Ht 5' 10\" (1.778 m)   Wt 115.7 kg (255 lb)   SpO2 98%   BMI 36.59 kg/m²        Review of Systems:   A comprehensive review of systems was negative except for that written in the HPI. Neuro Exam:     Appearance: The patient is well developed, well nourished, provides a coherent history and is in no acute distress. Mental Status: Oriented to time, place and person.  Mood and affect appropriate. Cranial Nerves:   Intact visual fields. Fundi are benign. MONSTER, EOM's full, no nystagmus, no ptosis. Facial sensation is normal. Corneal reflexes are intact. Facial movement is symmetric. Hearing is normal bilaterally. Palate is midline with normal sternocleidomastoid and trapezius muscles are normal. Tongue is midline. Motor:  5/5 strength in upper and lower proximal and distal muscles. Normal bulk and tone. No fasciculations. Reflexes:   Deep tendon reflexes 2+/4 and symmetrical.   Sensory:   Normal to touch, pinprick and vibration. Gait:  Normal gait. Tremor:   No tremor noted. Cerebellar:  No cerebellar signs present. Neurovascular:  Normal heart sounds and regular rhythm, peripheral pulses intact, and no carotid bruits. Assessment:   Migraine headaches. Continue Topamax and Relpax as is. Get good sleep minimize stress and even walking could help maintain headache control. Plan:   Revisit 6 months.   Signed by :  Zeyad Tirado MD

## 2019-06-12 RX ORDER — TOPIRAMATE 200 MG/1
TABLET ORAL
Qty: 225 TAB | Refills: 1 | Status: SHIPPED | OUTPATIENT
Start: 2019-06-12 | End: 2019-12-19

## 2019-06-24 ENCOUNTER — TELEPHONE (OUTPATIENT)
Dept: NEUROLOGY | Age: 49
End: 2019-06-24

## 2019-06-24 NOTE — TELEPHONE ENCOUNTER
Pt calling to request a call back in ref to the medication that she recently filled   Topamax  She is having some difficulty with medication  [facility] # [phone]

## 2019-06-24 NOTE — TELEPHONE ENCOUNTER
Patient was sent the wrong generic medication as an employee of [employer]. Patient will reach out to [address].

## 2019-07-09 ENCOUNTER — TELEPHONE (OUTPATIENT)
Dept: INTERNAL MEDICINE CLINIC | Age: 49
End: 2019-07-09

## 2019-07-09 NOTE — TELEPHONE ENCOUNTER
Pt states she is extremely sick and has been to urgent care facilities but cannot get better. She explained she has a horsed throat, redness in throat, fever and cannot stop coughing. She was placed on Promethazine and [place] but feels the same. She is requesting to see Roxy Tomlinson or Dr. Alma Cruz. I have explained protocol, however, she stated she has tried calling other practices and they are not execpting any patient and that she has not seen Dr. Holly Carl in years, she has always seen other providers. She would like to know if someone can see her today or sometime this week. Thanks.

## 2019-07-10 ENCOUNTER — OFFICE VISIT (OUTPATIENT)
Dept: INTERNAL MEDICINE CLINIC | Age: 49
End: 2019-07-10

## 2019-07-10 ENCOUNTER — DOCUMENTATION ONLY (OUTPATIENT)
Dept: INTERNAL MEDICINE CLINIC | Age: 49
End: 2019-07-10

## 2019-07-10 VITALS
RESPIRATION RATE: 18 BRPM | DIASTOLIC BLOOD PRESSURE: 83 MMHG | BODY MASS INDEX: 37.81 KG/M2 | HEIGHT: 70 IN | SYSTOLIC BLOOD PRESSURE: 117 MMHG | WEIGHT: 264.13 LBS | TEMPERATURE: 98.8 F | OXYGEN SATURATION: 96 % | HEART RATE: 76 BPM

## 2019-07-10 DIAGNOSIS — J45.31 MILD PERSISTENT REACTIVE AIRWAY DISEASE WITH ACUTE EXACERBATION: ICD-10-CM

## 2019-07-10 DIAGNOSIS — J04.0 LARYNGITIS: ICD-10-CM

## 2019-07-10 DIAGNOSIS — R05.9 COUGH: Primary | ICD-10-CM

## 2019-07-10 RX ORDER — CODEINE PHOSPHATE AND GUAIFENESIN 10; 100 MG/5ML; MG/5ML
5 SOLUTION ORAL
Qty: 105 ML | Refills: 0 | Status: SHIPPED | OUTPATIENT
Start: 2019-07-10 | End: 2019-07-17

## 2019-07-10 RX ORDER — AZITHROMYCIN 250 MG/1
250 TABLET, FILM COATED ORAL DAILY
COMMUNITY
Start: 2019-07-07 | End: 2019-09-19

## 2019-07-10 RX ORDER — PROMETHAZINE HYDROCHLORIDE AND DEXTROMETHORPHAN HYDROBROMIDE 6.25; 15 MG/5ML; MG/5ML
5 SYRUP ORAL
COMMUNITY
End: 2019-09-19

## 2019-07-10 RX ORDER — BUDESONIDE AND FORMOTEROL FUMARATE DIHYDRATE 160; 4.5 UG/1; UG/1
2 AEROSOL RESPIRATORY (INHALATION) 2 TIMES DAILY
Qty: 1 INHALER | Refills: 0 | Status: SHIPPED | OUTPATIENT
Start: 2019-07-10 | End: 2019-08-01 | Stop reason: SDUPTHER

## 2019-07-10 RX ORDER — LEVALBUTEROL INHALATION SOLUTION 1.25 MG/3ML
1.25 SOLUTION RESPIRATORY (INHALATION)
Qty: 1 NEBULE | Refills: 0
Start: 2019-07-10 | End: 2019-07-10

## 2019-07-10 RX ORDER — BENZONATATE 100 MG/1
CAPSULE ORAL
COMMUNITY
Start: 2019-07-07 | End: 2019-09-19

## 2019-07-10 NOTE — PATIENT INSTRUCTIONS
Reactive Airway Disease: Care Instructions  Your Care Instructions    Reactive airway disease is a breathing problem that appears as wheezing, a whistling noise in your airways. It may be caused by a viral or bacterial infection, allergies, tobacco smoke, or something else in the environment. When you are around these triggers, your body releases chemicals that make the airways get tight. Reactive airway disease is a lot like asthma. Both can cause wheezing. But asthma is ongoing, while reactive airway disease may occur only now and then. Tests can be done to tell whether you have asthma. You may take the same medicines used to treat asthma. Good home care and follow-up care with your doctor can help you recover. Follow-up care is a key part of your treatment and safety. Be sure to make and go to all appointments, and call your doctor if you are having problems. It's also a good idea to know your test results and keep a list of the medicines you take. How can you care for yourself at home? · Take your medicines exactly as prescribed. Call your doctor if you think you are having a problem with your medicine. · Do not smoke or allow others to smoke around you. If you need help quitting, talk to your doctor about stop-smoking programs and medicines. These can increase your chances of quitting for good. · If you know what caused your wheezing (such as perfume or the odor of household chemicals), try to avoid it in the future. · Wash your hands several times a day, and consider using hand gels or wipes that contain alcohol. This can prevent colds and other infections. When should you call for help? Call 911 anytime you think you may need emergency care. For example, call if:    · You have severe trouble breathing.    Watch closely for changes in your health, and be sure to contact your doctor if:    · You cough up yellow, dark brown, or bloody mucus.     · You have a fever.     · Your wheezing gets worse. Where can you learn more? Go to http://shashank-elías.info/. Enter L495 in the search box to learn more about \"Reactive Airway Disease: Care Instructions. \"  Current as of: September 5, 2018  Content Version: 11.9  © 5949-0673 [employer]. Care instructions adapted under license by [employer] (which disclaims liability or warranty for this information). If you have questions about a medical condition or this instruction, always ask your healthcare professional. [place] [address] any warranty or liability for your use of this information. Laryngitis: Care Instructions  Your Care Instructions    Laryngitis is an inflammation of the voice box (larynx) that causes your voice to become raspy or hoarse. It can be short-lived or long-lasting. Most of the time, laryngitis comes on quickly and lasts as long as 2 weeks. It is caused by overuse, irritation, or infection of the vocal cords inside the larynx. Some of the most common causes are a cold, the flu, or allergies. Loud talking, shouting, cheering, or singing also can cause laryngitis. Stomach acid that backs up into the throat also can make you lose your voice. Resting your voice and taking other steps at home can help you get your voice back. Follow-up care is a key part of your treatment and safety. Be sure to make and go to all appointments, and call your doctor if you are having problems. It's also a good idea to know your test results and keep a list of the medicines you take. How can you care for yourself at home? · Follow your doctor's directions for treating the condition that caused you to lose your voice. If your doctor prescribed antibiotics, take them as directed. Do not stop taking them just because you feel better. You need to take the full course of antibiotics. · Before you use cough and cold medicines, check the label.  They may not be safe for young children or for people with certain health problems. · Try to keep stomach acid from backing up into your throat. Do not eat just before bedtime. Reduce the amount of coffee and alcohol you drink, and eat healthy foods. Taking over-the-counter acid reducers can help when these steps are not enough. In some cases, you may need prescription medicine. · Rest your voice. You do not have to stop speaking, but use your voice as little as possible. Speak softly but do not whisper; whispering can bother your larynx more than speaking softly. Avoid talking on the telephone or trying to speak loudly. · Try not to clear your throat. This can cause more irritation of your larynx. Take an over-the-counter cough suppressant (if your doctor recommends it) if you have a dry cough that does not produce mucus. · Do not smoke or allow others to smoke around you. If you need help quitting, talk to your doctor about stop-smoking programs and medicines. These can increase your chances of quitting for good. · Use a humidifier or vaporizer to add moisture to your bedroom. Humidity helps to thin the mucus in the nasal membranes that causes stuffiness or postnasal drip. Follow the directions for cleaning the machine. · Drink plenty of fluids, enough so that your urine is light yellow or clear like water. If you have kidney, heart, or liver disease and have to limit fluids, talk with your doctor before you increase the amount of fluids you drink. · Use saline (saltwater) nasal washes to help keep your nasal passages open and wash out mucus and bacteria. You can buy saline nose drops at a grocery store or drugstore. Or, you can make your own at home by mixing ½ teaspoon salt, 1 cup water (at room temperature), and ½ teaspoon baking soda. If you make your own, fill a bulb syringe with the solution, insert the tip into your nostril, and squeeze gently. Theadore Gong your nose. When should you call for help? Call 911 anytime you think you may need emergency care.  For example, call if:    · You have trouble breathing.    Call your doctor now or seek immediate medical care if:    · You have new or worse pain.     · You have trouble swallowing.    Watch closely for changes in your health, and be sure to contact your doctor if:    · You do not get better as expected. Where can you learn more? Go to http://shashank-elías.info/. Enter I514 in the search box to learn more about \"Laryngitis: Care Instructions. \"  Current as of: March 27, 2018  Content Version: 11.9  © 9632-9478 [employer], [employer]. Care instructions adapted under license by [employer] (which disclaims liability or warranty for this information). If you have questions about a medical condition or this instruction, always ask your healthcare professional. [address] any warranty or liability for your use of this information.

## 2019-07-10 NOTE — PROGRESS NOTES
HISTORY OF PRESENT ILLNESS  Clarice Walker is a 51 y.o. female. HPI  Patient of Dr Inocente Grijalva who presents with complaints of sore throat, voice hoarseness, harsh cough that has been mainly nonproductive for the past 10 days. She was seen at [facility] on  with same symptoms and tested negative for strep pharyngitis. Was started on Zithromax Zpack, Tessalon perles and Promethazine DM but cough has persisted and she has had some upper chest tightness. Denies history of asthma. Cough has been disturbing her sleep. Reports low grade fever at onset of illness but has been afebrile for 5 days. Has been intolerant of oral steroids in past.      Patient Active Problem List   Diagnosis Code    Migraines G43.909    Lumbar disc disorder M51.9    Spondylolisthesis, lumbar region M43.16    Severe obesity (BMI 35.0-39. 9) E66.01     Past Surgical History:   Procedure Laterality Date    HX  SECTION      HX LUMBAR DISKECTOMY      L 5 S1    HX LUMBAR FUSION N/A 2018    L4-S1 ALIF    HX TUBAL LIGATION       Social History     Socioeconomic History    Marital status:      Spouse name: Not on file    Number of children: Not on file    Years of education: Not on file    Highest education level: Not on file   Occupational History    Not on file   Social Needs    Financial resource strain: Not on file    Food insecurity:     Worry: Not on file     Inability: Not on file    Transportation needs:     Medical: Not on file     Non-medical: Not on file   Tobacco Use    Smoking status: Never Smoker    Smokeless tobacco: Never Used   Substance and Sexual Activity    Alcohol use:  Yes     Alcohol/week: 0.6 oz     Types: 1 Glasses of wine per week     Comment: x1 a month    Drug use: No    Sexual activity: Yes     Partners: Male     Birth control/protection: Surgical   Lifestyle    Physical activity:     Days per week: Not on file     Minutes per session: Not on file    Stress: Not on file   Relationships    Social connections:     Talks on phone: Not on file     Gets together: Not on file     Attends Latter-day service: Not on file     Active member of club or organization: Not on file     Attends meetings of clubs or organizations: Not on file     Relationship status: Not on file    Intimate partner violence:     Fear of current or ex partner: Not on file     Emotionally abused: Not on file     Physically abused: Not on file     Forced sexual activity: Not on file   Other Topics Concern    Not on file   Social History Narrative    Not on file     Family History   Problem Relation Age of Onset    Suicide Father     Depression Father     Depression Mother     Other Sister         Kidney Issues    No Known Problems Sister     No Known Problems Sister     Hypertension Maternal Grandfather     Diabetes Maternal Grandfather     Cancer Paternal Grandmother         lung     Current Outpatient Medications   Medication Sig    promethazine-dextromethorphan (PROMETHAZINE-DM) 6.25-15 mg/5 mL syrup 5 mL every four (4) hours as needed.  benzonatate (TESSALON) 100 mg capsule two (2) times daily as needed.  azithromycin (ZITHROMAX) 250 mg tablet Take 250 mg by mouth daily.  levalbuterol (XOPENEX) 1.25 mg/3 mL nebu 3 mL by Nebulization route now for 1 dose.  budesonide-formoterol (SYMBICORT) 160-4.5 mcg/actuation HFAA Take 2 Puffs by inhalation two (2) times a day. Rinse mouth with water after each use    guaiFENesin-codeine (ROBITUSSIN AC) 100-10 mg/5 mL solution Take 5 mL by mouth three (3) times daily as needed for Cough for up to 7 days. Max Daily Amount: 15 mL.  topiramate (TOPAMAX) 200 mg tablet FOR DIRECTIONS ON HOW TO   TAKE THIS MEDICINE, READ   THE ENCLOSED MEDICATION    INFORMATION FORM    RELPAX 40 mg tablet TAKE 1 TABLET BY MOUTH ONCE AS NEEDED.  MAY REPEAT IN 2 HOURS IF NEEDED    ferrous sulfate 325 mg (65 mg iron) tablet Take 325 mg by mouth Daily (before breakfast). No current facility-administered medications for this visit. Allergies   Allergen Reactions    Topamax [Topiramate] Other (comments)     Low grade fever   Only with brand name ok with generic     Immunization History   Administered Date(s) Administered    Influenza Vaccine (Quad) PF 12/07/2017    TDAP Vaccine 09/05/2012       Review of Systems   Constitutional: Positive for malaise/fatigue. Negative for chills and fever. HENT: Positive for sore throat. Negative for congestion. Respiratory: Positive for cough, shortness of breath and wheezing. Cardiovascular: Negative for chest pain and palpitations. Gastrointestinal: Negative for nausea and vomiting. Musculoskeletal: Negative for myalgias. Neurological: Negative for headaches. /83 (BP 1 Location: Left arm, BP Patient Position: Sitting)   Pulse 76   Temp 98.8 °F (37.1 °C) (Oral)   Resp 18   Ht 5' 10\" (1.778 m)   Wt 264 lb 2 oz (119.8 kg)   SpO2 96%   BMI 37.90 kg/m²   Physical Exam   Constitutional: She is oriented to person, place, and time. She appears well-developed and well-nourished. HENT:   Head: Normocephalic and atraumatic. Right Ear: External ear normal.   Left Ear: External ear normal.   Nose: Nose normal.   Mouth/Throat: Posterior oropharyngeal erythema present. Neck: Normal range of motion. Neck supple. No thyromegaly present. Cardiovascular: Normal rate and regular rhythm. Pulmonary/Chest: Effort normal. She has wheezes. She has no rales. Scattered wheezing throughout. Lymphadenopathy:     She has no cervical adenopathy. Neurological: She is alert and oriented to person, place, and time. Psychiatric: She has a normal mood and affect. Her behavior is normal.   Nursing note and vitals reviewed. ASSESSMENT and PLAN  Diagnoses and all orders for this visit:    1. Cough -- can increase Tessalon to 200 mg as needed. Continue with Zithromax Zpack to cover for atypical bacteria.   - guaiFENesin-codeine (ROBITUSSIN AC) 100-10 mg/5 mL solution; Take 5 mL by mouth three (3) times daily as needed for Cough for up to 7 days. Max Daily Amount: 15 mL. 2. Mild persistent reactive airway disease with acute exacerbation -- Xopenex nebulizer treatment given in office with improvement of air exchange. -     LEVALBUTEROL, INHAL. SOL., FDA-APPROVED FINAL, NON-COMPOUND UNIT DOSE, 0.5 MG  -     levalbuterol (XOPENEX) 1.25 mg/3 mL nebu; 3 mL by Nebulization route now for 1 dose. -     DE PRESSURIZED/NONPRESSURIZED INHALATION TREATMENT  -     budesonide-formoterol (SYMBICORT) 160-4.5 mcg/actuation HFAA; Take 2 Puffs by inhalation two (2) times a day. Rinse mouth with water after each use    3. Laryngitis -- voice rest; increase fluids.        lab results and schedule of future lab studies reviewed with patient  reviewed diet, exercise and weight control  reviewed medications and side effects in detail

## 2019-08-01 DIAGNOSIS — J45.31 MILD PERSISTENT REACTIVE AIRWAY DISEASE WITH ACUTE EXACERBATION: ICD-10-CM

## 2019-08-01 RX ORDER — BUDESONIDE AND FORMOTEROL FUMARATE DIHYDRATE 160; 4.5 UG/1; UG/1
AEROSOL RESPIRATORY (INHALATION)
Qty: 10.2 INHALER | Refills: 0 | Status: SHIPPED | OUTPATIENT
Start: 2019-08-01 | End: 2019-09-19

## 2019-09-11 ENCOUNTER — TELEPHONE (OUTPATIENT)
Dept: NEUROLOGY | Age: 49
End: 2019-09-11

## 2019-09-11 NOTE — TELEPHONE ENCOUNTER
Call placed to patient to make sure she had been advised of recent Relpax recall. Patient aware and had actually rec'd recalled medication. Medication replaced by [facility].

## 2019-09-19 ENCOUNTER — OFFICE VISIT (OUTPATIENT)
Dept: CARDIOLOGY CLINIC | Age: 49
End: 2019-09-19

## 2019-09-19 VITALS
HEART RATE: 75 BPM | OXYGEN SATURATION: 97 % | SYSTOLIC BLOOD PRESSURE: 125 MMHG | HEIGHT: 70 IN | WEIGHT: 265.6 LBS | BODY MASS INDEX: 38.02 KG/M2 | DIASTOLIC BLOOD PRESSURE: 88 MMHG

## 2019-09-19 DIAGNOSIS — R07.9 CHEST PAIN, UNSPECIFIED TYPE: Primary | ICD-10-CM

## 2019-09-19 DIAGNOSIS — I10 HYPERTENSION, UNSPECIFIED TYPE: ICD-10-CM

## 2019-09-19 NOTE — PROGRESS NOTES
Danis Amanda MD. [facility]              Patient: Francoise Colon  : 1970      Today's Date: 2019            HISTORY OF PRESENT ILLNESS:     History of Present Illness:  She is self referred for chest pain. Off and on for several years -- has been in ER before. This past weekend got dizzy - she had a migraine then - would get dizzy walking - felt pulse in head / throat. CP random - not exertional - lasts several minutes. No sig SOB. PAST MEDICAL HISTORY:     Past Medical History:   Diagnosis Date    Anemia     chronic    Chronic pain     Ill-defined condition     disk issues in lumbar spine    Migraine     Obesity (BMI 35.0-39.9 without comorbidity) 2018    Stool color black     Because she takes iron         Past Surgical History:   Procedure Laterality Date    HX  SECTION      HX LUMBAR DISKECTOMY      L 5 S1    HX LUMBAR FUSION N/A 2018    L4-S1 ALIF    HX TUBAL LIGATION             MEDICATIONS:     Current Outpatient Medications   Medication Sig Dispense Refill    topiramate (TOPAMAX) 200 mg tablet FOR DIRECTIONS ON HOW TO   TAKE THIS MEDICINE, READ   THE ENCLOSED MEDICATION    INFORMATION FORM 225 Tab 1    RELPAX 40 mg tablet TAKE 1 TABLET BY MOUTH ONCE AS NEEDED. MAY REPEAT IN 2 HOURS IF NEEDED 27 Tab 3    ferrous sulfate 325 mg (65 mg iron) tablet Take 325 mg by mouth Daily (before breakfast). Allergies   Allergen Reactions    Topamax [Topiramate] Other (comments)     Low grade fever   Only with brand name ok with generic             SOCIAL HISTORY:     Social History     Tobacco Use    Smoking status: Never Smoker    Smokeless tobacco: Never Used   Substance Use Topics    Alcohol use:  Yes     Alcohol/week: 1.0 standard drinks     Types: 1 Glasses of wine per week     Comment: x1 a month    Drug use: No         FAMILY HISTORY:     Family History   Problem Relation Age of Onset    Suicide Father     Depression Father    [facility] Depression Mother     Other Sister         Kidney Issues    No Known Problems Sister     No Known Problems Sister     Hypertension Maternal Grandfather     Diabetes Maternal Grandfather     Cancer Paternal Grandmother         lung             REVIEW OF SYMPTOMS:     Review of Symptoms:  Constitutional: Negative for fever, chills  HEENT: Negative for nosebleeds, tinnitus, and vision changes. Respiratory: Negative for cough, wheezing  Cardiovascular: Negative for orthopnea, claudication, syncope, and PND. Gastrointestinal: Negative for abdominal pain, diarrhea, melena. Genitourinary: Negative for dysuria  Musculoskeletal: Negative for myalgias. Skin: Negative for rash  Heme: No problems bleeding. Neurological: Negative for speech change and focal weakness. PHYSICAL EXAM:     Physical Exam:  Visit Vitals  /88   Pulse 75   Ht 5' 10\" (1.778 m)   Wt 265 lb 9.6 oz (120.5 kg)   SpO2 97%   BMI 38.11 kg/m²     Patient appears generally well, mood and affect are appropriate and pleasant. HEENT:  Hearing intact, non-icteric, normocephalic, atraumatic. Neck Exam: Supple, No JVD or carotid bruits. Lung Exam: Clear to auscultation, even breath sounds. Cardiac Exam: Regular rate and rhythm with no murmur  Abdomen: Soft, non-tender, normal bowel sounds. + obese   Extremities: Moves all ext well. No lower extremity edema.   Psych: Appropriate affect  Neuro - Grossly intact        LABS / OTHER STUDIES:     Lab Results   Component Value Date/Time    Sodium 138 08/31/2018 11:35 AM    Potassium 4.1 08/31/2018 11:35 AM    Chloride 109 (H) 08/31/2018 11:35 AM    CO2 18 (L) 08/31/2018 11:35 AM    Anion gap 8 05/05/2018 03:03 PM    Glucose 86 08/31/2018 11:35 AM    BUN 12 08/31/2018 11:35 AM    Creatinine 0.78 08/31/2018 11:35 AM    BUN/Creatinine ratio 15 08/31/2018 11:35 AM    GFR est  08/31/2018 11:35 AM    GFR est non-AA 91 08/31/2018 11:35 AM    Calcium 8.6 (L) 08/31/2018 11:35 AM    Bilirubin, total <0.2 08/31/2018 11:35 AM    AST (SGOT) 11 08/31/2018 11:35 AM    Alk. phosphatase 93 08/31/2018 11:35 AM    Protein, total 6.5 08/31/2018 11:35 AM    Albumin 4.2 08/31/2018 11:35 AM    Globulin 4.0 05/05/2018 03:03 PM    A-G Ratio 0.8 (L) 05/05/2018 03:03 PM    ALT (SGPT) 12 08/31/2018 11:35 AM     Lab Results   Component Value Date/Time    WBC 4.1 05/05/2018 03:03 PM    Hemoglobin (POC) 9.5 (L) 08/30/2015 12:52 AM    HGB 12.0 05/05/2018 03:03 PM    Hematocrit (POC) 28 (L) 08/30/2015 12:52 AM    HCT 37.6 05/05/2018 03:03 PM    PLATELET 543 96/23/0535 03:03 PM    MCV 94.0 05/05/2018 03:03 PM       Lab Results   Component Value Date/Time    Cholesterol, total 206 (H) 08/31/2018 11:35 AM    HDL Cholesterol 40 08/31/2018 11:35 AM    LDL, calculated 140 (H) 08/31/2018 11:35 AM    VLDL, calculated 26 08/31/2018 11:35 AM    Triglyceride 131 08/31/2018 11:35 AM    CHOL/HDL Ratio 5.0 10/29/2009 11:18 AM     Lab Results   Component Value Date/Time    TSH 2.390 12/07/2017 04:37 AM           CARDIAC DIAGNOSTICS:     Cardiac Evaluation Includes:    EKG 9/19/19 - NSR, normal     ASSESSMENT AND PLAN:     Assessment and Plan:  1) Atypical chest pain   - Symptoms likely non-cardiac in nature   - Check a treadmill stress test (she says she can walk on a treadmill) and echo     2) Dyslipidemia  - Lipids are high, but she does not meet indications for a statin at this time   - diet / exercise discussed   - info given on CT heart scan for further risk assessment     3) BP a little high today (still mildly high on my recheck) but usually OK - she was asked to keep an eye on this     4) See me PRN. Patient expressed understanding of the plan - questions were answered. Combined has 4 girls. Alwyn Goodell / Augustina Tobin. Sheba Contreras MD, [address]  [address] [address]  [facility] [address], [address] [place] [address]  Ph: [phone]   Ph [phone]

## 2019-09-19 NOTE — PROGRESS NOTES
Soha Barrera is a 51 y.o. female    Chief Complaint   Patient presents with    New Patient    Chest Pain       Chest pain more on the left side. SOB No    Dizziness No    Swelling some in her hands. Feet tend to swell when traveling and and when walking. Refills No    Visit Vitals  /90   Pulse 75   Ht 5' 10\" (1.778 m)   Wt 265 lb 9.6 oz (120.5 kg)   SpO2 97%   BMI 38.11 kg/m²       1. Have you been to the ER, urgent care clinic since your last visit? Hospitalized since your last visit? No    2. Have you seen or consulted any other health care providers outside of the [address] [place] [address] since your last visit? Include any pap smears or colon screening.  No

## 2019-11-15 ENCOUNTER — OFFICE VISIT (OUTPATIENT)
Dept: NEUROLOGY | Age: 49
End: 2019-11-15

## 2019-11-15 VITALS
WEIGHT: 261 LBS | OXYGEN SATURATION: 98 % | RESPIRATION RATE: 18 BRPM | DIASTOLIC BLOOD PRESSURE: 72 MMHG | SYSTOLIC BLOOD PRESSURE: 128 MMHG | HEIGHT: 70 IN | HEART RATE: 77 BPM | BODY MASS INDEX: 37.37 KG/M2

## 2019-11-15 DIAGNOSIS — G43.009 MIGRAINE WITHOUT AURA AND WITHOUT STATUS MIGRAINOSUS, NOT INTRACTABLE: Primary | ICD-10-CM

## 2019-11-15 NOTE — PROGRESS NOTES
Neurology Consult      Subjective:      Nadya Encinas is a 51 y.o. female who comes in today with recurrent migraine headaches. By way of recall she is on Topamax 500 mg/day and Relpax 40 mg.  Mentioned at the very end she is trying to look into the Topamax Ernie mean combination is a weight reduction direction. Went over the newer product that hopefully will be out sooner rather than later called lasmiditan or Reyvow. It will be a new drug in class for migraine rescue since the triptan's came out in the . Will be offered by [employer] and I think it may be  before this drug is actually in place? Patient does not reference any new medical or surgical history. Has found out by trial and air and the discovery process that combining the Relpax with over-the-counter Advil or similar agents can give her a more consistent and successful headache resolution. Revisit 6 months. Current Outpatient Medications   Medication Sig Dispense Refill    topiramate (TOPAMAX) 200 mg tablet FOR DIRECTIONS ON HOW TO   TAKE THIS MEDICINE, READ   THE ENCLOSED MEDICATION    INFORMATION FORM 225 Tab 1    RELPAX 40 mg tablet TAKE 1 TABLET BY MOUTH ONCE AS NEEDED. MAY REPEAT IN 2 HOURS IF NEEDED 27 Tab 3    ferrous sulfate 325 mg (65 mg iron) tablet Take 325 mg by mouth Daily (before breakfast).         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    Migraine     Obesity (BMI 35.0-39.9 without comorbidity) 2018    Stool color black     Because she takes iron      Past Surgical History:   Procedure Laterality Date    HX  SECTION      HX LUMBAR DISKECTOMY  2004    L 5 S1    HX LUMBAR FUSION N/A 2018    L4-S1 ALIF    HX TUBAL LIGATION        Social History Socioeconomic History    Marital status:      Spouse name: Not on file    Number of children: Not on file    Years of education: Not on file    Highest education level: Not on file   Occupational History    Not on file   Social Needs    Financial resource strain: Not on file    Food insecurity:     Worry: Not on file     Inability: Not on file    Transportation needs:     Medical: Not on file     Non-medical: Not on file   Tobacco Use    Smoking status: Never Smoker    Smokeless tobacco: Never Used   Substance and Sexual Activity    Alcohol use:  Yes     Alcohol/week: 1.0 standard drinks     Types: 1 Glasses of wine per week     Comment: x1 a month    Drug use: No    Sexual activity: Yes     Partners: Male     Birth control/protection: Surgical   Lifestyle    Physical activity:     Days per week: Not on file     Minutes per session: Not on file    Stress: Not on file   Relationships    Social connections:     Talks on phone: Not on file     Gets together: Not on file     Attends Caodaism service: Not on file     Active member of club or organization: Not on file     Attends meetings of clubs or organizations: Not on file     Relationship status: Not on file    Intimate partner violence:     Fear of current or ex partner: Not on file     Emotionally abused: Not on file     Physically abused: Not on file     Forced sexual activity: Not on file   Other Topics Concern    Not on file   Social History Narrative    Not on file      Family History   Problem Relation Age of Onset    Suicide Father     Depression Father     Depression Mother     Other Sister         Kidney Issues    No Known Problems Sister     No Known Problems Sister     Hypertension Maternal Grandfather     Diabetes Maternal Grandfather     Cancer Paternal Grandmother         lung      Visit Vitals  /72 (BP 1 Location: Left arm, BP Patient Position: Sitting)   Pulse 77   Resp 18   Ht 5' 10\" (1.778 m)   Wt 118.4 kg (261 lb)   SpO2 98%   BMI 37.45 kg/m²        Review of Systems:   A comprehensive review of systems was negative except for that written in the HPI. Neuro Exam:     Appearance: The patient is well developed, well nourished, provides a coherent history and is in no acute distress. Mental Status: Oriented to time, place and person. Mood and affect appropriate. Cranial Nerves:   Intact visual fields. Fundi are benign. MONSTER, EOM's full, no nystagmus, no ptosis. Facial sensation is normal. Corneal reflexes are intact. Facial movement is symmetric. Hearing is normal bilaterally. Palate is midline with normal sternocleidomastoid and trapezius muscles are normal. Tongue is midline. Motor:  5/5 strength in upper and lower proximal and distal muscles. Normal bulk and tone. No fasciculations. Reflexes:   Deep tendon reflexes 2+/4 and symmetrical.   Sensory:   Normal to touch, pinprick and vibration. Gait:  Normal gait. Tremor:   No tremor noted. Cerebellar:  No cerebellar signs present. Neurovascular:  Normal heart sounds and regular rhythm, peripheral pulses intact, and no carotid bruits. Assessment:   Migraine headaches. Will suggest continuation of Topamax and Relpax as referenced. Please see above dictation. Get good sleep minimize stress and exercise could help the cause. Revisit 6 months. Plan:   Revisit 6 months.   Signed by :  Johnna Lockhart MD

## 2019-11-15 NOTE — PROGRESS NOTES
Chief Complaint   Patient presents with    Migraine     2 migraine in a month lasting greater than 24 hours     Visit Vitals  /72 (BP 1 Location: Left arm, BP Patient Position: Sitting)   Pulse 77   Resp 18   Ht 5' 10\" (1.778 m)   Wt 118.4 kg (261 lb)   SpO2 98%   BMI 37.45 kg/m²

## 2019-11-15 NOTE — PATIENT INSTRUCTIONS
Patient history reviewed patient examined. Will recommend continuation of Topamax and Relpax as it is. Briefly went over and in apprising new drug similar to Relpax with a slightly different chemistry. May be out in the new year?

## 2019-11-15 NOTE — LETTER
11/15/19 Patient: Dami Interiano YOB: 1970 Date of Visit: 11/15/2019 Jaya Subramanian MD 
[address] [facility] [address] [address] VIA Facsimile: [phone] Dear Jaya Subramanian MD, Thank you for referring Ms. Jake Cantrell to [facility] for evaluation. My notes for this consultation are attached. If you have questions, please do not hesitate to call me. I look forward to following your patient along with you.  
 
 
Sincerely, 
 
Blayne Velasquez MD

## 2019-12-19 RX ORDER — TOPIRAMATE 200 MG/1
TABLET ORAL
Qty: 225 TAB | Refills: 1 | Status: SHIPPED | OUTPATIENT
Start: 2019-12-19 | End: 2020-06-06

## 2020-02-13 DIAGNOSIS — G43.009 MIGRAINE WITHOUT AURA AND WITHOUT STATUS MIGRAINOSUS, NOT INTRACTABLE: ICD-10-CM

## 2020-02-13 RX ORDER — ELETRIPTAN HYDROBROMIDE 40 MG/1
TABLET, FILM COATED ORAL
Qty: 36 TAB | Refills: 1 | Status: SHIPPED | OUTPATIENT
Start: 2020-02-13 | End: 2020-05-14 | Stop reason: SDUPTHER

## 2020-02-13 NOTE — TELEPHONE ENCOUNTER
Patient calls to report that she is able to get Relpax 40mg, #36 for a 75 day supply through her insurance, and is requesting an Rx be sent to her pharmacy plan.   Dr. Sam Valenzuela made aware of request.

## 2020-02-13 NOTE — TELEPHONE ENCOUNTER
----- Message from Royer Simmons sent at 2/13/2020  8:51 AM EST -----  Regarding: Dr Ramcahandran/telephone  Pt would like to speak to the doctor regarding her medication, please call the pt at [phone]

## 2020-05-14 ENCOUNTER — VIRTUAL VISIT (OUTPATIENT)
Dept: NEUROLOGY | Age: 50
End: 2020-05-14

## 2020-05-14 DIAGNOSIS — G43.009 MIGRAINE WITHOUT AURA AND WITHOUT STATUS MIGRAINOSUS, NOT INTRACTABLE: Primary | ICD-10-CM

## 2020-05-14 RX ORDER — ELETRIPTAN HYDROBROMIDE 40 MG/1
TABLET, FILM COATED ORAL
Qty: 36 TAB | Refills: 5 | Status: SHIPPED | OUTPATIENT
Start: 2020-05-14 | End: 2020-05-18 | Stop reason: SDUPTHER

## 2020-05-14 NOTE — PROGRESS NOTES
Tai Pineda is a 53 y.o. female who was seen by synchronous (real-time) audio-video technology on 5/14/2020. Consent: Tai Pineda, who was seen by synchronous (real-time) audio-video technology, and/or her healthcare decision maker, is aware that this patient-initiated, Telehealth encounter on 5/14/2020 is a billable service, with coverage as determined by her insurance carrier. She is aware that she may receive a bill and has provided verbal consent to proceed: Yes. Assessment & Plan:   Recurrent migraine headaches. Suggest continuing with the Topamax as already dispensed and will renew the Relpax by request.  Talked about some recent additions to the rescue remedies available and on revisit seem to express some interest possibly in 1 of the new CGRP preventative drug options. Good good sleep minimize stress and exercise could help. I spent at least 23 minutes on this visit with this established patient. ([id number])    Subjective:   Tai Pineda is a 53 y.o. female who was seen for Headache (3-4 headaches per week) and Stress    This was a virtual video visit with patient this afternoon. Continues with her Topamax 500 mg daily and have some concerns about further increases in that product. In fact on revisit she seems to be very interested in discussing what other options are available including the newer CGRP antagonist as preventatives. Also referenced some of the newer rescue remedies that she seen on TV for her information. Recently headaches have increased in frequency and it probably goes to the stress on the job and thus will suggest a revisit in 6 months. Coronavirus isolation precaution issues. Will renew the Relpax by request.  No new med surgery issues. Prior to Admission medications    Medication Sig Start Date End Date Taking? Authorizing Provider   Relpax 40 mg tablet TAKE 1 TABLET BY MOUTH ONCE AS NEEDED.  MAY REPEAT IN 2 HOURS IF NEEDED  Indications: a migraine headache 20  Yes Kenney Morgan MD   topiramate (TOPAMAX) 200 mg tablet TAKE 1 TABLET EVERY MORNING, AND 1 AND 1/2 TABLETS (=300MG) EVERY EVENING FOR MIGRAINE PREVENTION 19  Yes Kenney Morgan MD   ferrous sulfate 325 mg (65 mg iron) tablet Take 325 mg by mouth Daily (before breakfast). Yes Provider, Historical     Allergies   Allergen Reactions    Topamax [Topiramate] Other (comments)     Low grade fever   Only with brand name ok with generic       Past Medical History:   Diagnosis Date    Anemia     chronic    Chronic pain     Ill-defined condition     disk issues in lumbar spine    Migraine     Obesity (BMI 35.0-39.9 without comorbidity) 2018    Stool color black     Because she takes iron     Past Surgical History:   Procedure Laterality Date    HX  SECTION      HX LUMBAR DISKECTOMY      L 5 S1    HX LUMBAR FUSION N/A 2018    L4-S1 ALIF    HX TUBAL LIGATION       Family History   Problem Relation Age of Onset    Suicide Father    [facility] Depression Father     Depression Mother     Other Sister         Kidney Issues    No Known Problems Sister     No Known Problems Sister     Hypertension Maternal Grandfather     Diabetes Maternal Grandfather     Cancer Paternal Grandmother         lung     Social History     Tobacco Use    Smoking status: Never Smoker    Smokeless tobacco: Never Used   Substance Use Topics    Alcohol use:  Yes     Alcohol/week: 1.0 standard drinks     Types: 1 Glasses of wine per week     Comment: x1 a month       ROS    Objective:   Vital Signs: (As obtained by patient/caregiver at home)  Visit Vitals  LMP 2020 (Exact Date)        [INSTRUCTIONS:  \"[x]\" Indicates a positive item  \"[]\" Indicates a negative item  -- DELETE ALL ITEMS NOT EXAMINED]    Constitutional: [x] Appears well-developed and well-nourished [x] No apparent distress      [] Abnormal -     Mental status: [x] Alert and awake  [x] Oriented to person/place/time [x] Able to follow commands    [] Abnormal -     Eyes:   EOM    [x]  Normal    [] Abnormal -   Sclera  [x]  Normal    [] Abnormal -          Discharge [x]  None visible   [] Abnormal -     HENT: [x] Normocephalic, atraumatic  [] Abnormal -   [x] Mouth/Throat: Mucous membranes are moist    External Ears [x] Normal  [] Abnormal -    Neck: [x] No visualized mass [] Abnormal -     Pulmonary/Chest: [x] Respiratory effort normal   [x] No visualized signs of difficulty breathing or respiratory distress        [] Abnormal -      Musculoskeletal:   [x] Normal gait with no signs of ataxia         [x] Normal range of motion of neck        [] Abnormal -     Neurological:        [x] No Facial Asymmetry (Cranial nerve 7 motor function) (limited exam due to video visit)          [x] No gaze palsy        [] Abnormal -          Skin:        [x] No significant exanthematous lesions or discoloration noted on facial skin         [] Abnormal -            Psychiatric:       [x] Normal Affect [] Abnormal -        [x] No Hallucinations    Other pertinent observable physical exam findings:-        We discussed the expected course, resolution and complications of the diagnosis(es) in detail. Medication risks, benefits, costs, interactions, and alternatives were discussed as indicated. I advised her to contact the office if her condition worsens, changes or fails to improve as anticipated. She expressed understanding with the diagnosis(es) and plan. Lauren Ramos is a 53 y.o. female who was evaluated by a video visit encounter for concerns as above. Patient identification was verified prior to start of the visit. A caregiver was present when appropriate. Due to this being a TeleHealth encounter (During ZPDOK-89 public health emergency), evaluation of the following organ systems was limited: Vitals/Constitutional/EENT/Resp/CV/GI//MS/Neuro/Skin/Heme-Lymph-Imm.   Pursuant to the emergency declaration under the [address] Emergencies Act, 1135 waiver authority and the Coronavirus Preparedness and Response Supplemental Appropriations Act, this Virtual  Visit was conducted, with patient's (and/or legal guardian's) consent, to reduce the patient's risk of exposure to COVID-19 and provide necessary medical care. Services were provided through a video synchronous discussion virtually to substitute for in-person clinic visit. Patient was at home and provider in the office.        Alma Nair MD

## 2020-05-14 NOTE — PATIENT INSTRUCTIONS
This was a virtual video visit format with the patient today on her recurrent migraines. We will keep things as they are as a goes to the Topamax and the Relpax. Did seem to be some interest perhaps on revisit of talking about the newer CGRP preventative drugs. Went over some of the newer rescue drugs on the market since last seen and the usual suggestions as it goes to getting good sleep minimizing stress and trying to find perhaps some type of niche with regular physical activity even walking could help.   Relpax will be renewed by request.

## 2020-05-18 ENCOUNTER — TELEPHONE (OUTPATIENT)
Dept: NEUROLOGY | Age: 50
End: 2020-05-18

## 2020-05-18 DIAGNOSIS — G43.009 MIGRAINE WITHOUT AURA AND WITHOUT STATUS MIGRAINOSUS, NOT INTRACTABLE: ICD-10-CM

## 2020-05-18 RX ORDER — ELETRIPTAN HYDROBROMIDE 40 MG/1
TABLET, FILM COATED ORAL
Qty: 36 TAB | Refills: 5 | Status: SHIPPED | OUTPATIENT
Start: 2020-05-18 | End: 2021-01-08 | Stop reason: SDUPTHER

## 2020-05-18 NOTE — TELEPHONE ENCOUNTER
----- Message from Caryn Sandhu sent at 5/18/2020 10:43 AM EDT -----  Regarding: /telephone  General Message/Vendor Calls    Caller's first and last name:      Reason for call: The pt would like to get the green stone generic of Rel paks which is made by [employer].       Callback required yes/no and why: yes      Best contact number(s):[phone]

## 2020-05-19 ENCOUNTER — TELEPHONE (OUTPATIENT)
Dept: NEUROLOGY | Age: 50
End: 2020-05-19

## 2020-05-19 NOTE — TELEPHONE ENCOUNTER
----- Message from Mary Maldonado sent at 5/19/2020  9:16 AM EDT -----  Regarding: Dr. Richy Greene  General Message/Vendor Calls    Caller's first and last name: Jace Olszewski, pharmacy tech w/[facility]      Reason for call: Verification is needed on pt's medication of Relpax 40 mg tablets      Callback required yes/no and why: Yes      Best contact number(s): [phone] ref #[id number]      Details to clarify the request:      Mary Maldonado

## 2020-06-06 RX ORDER — TOPIRAMATE 200 MG/1
TABLET ORAL
Qty: 225 TAB | Refills: 1 | Status: SHIPPED | OUTPATIENT
Start: 2020-06-06 | End: 2020-11-11

## 2020-08-10 ENCOUNTER — OFFICE VISIT (OUTPATIENT)
Dept: INTERNAL MEDICINE CLINIC | Age: 50
End: 2020-08-10
Payer: COMMERCIAL

## 2020-08-10 ENCOUNTER — HOSPITAL ENCOUNTER (OUTPATIENT)
Dept: LAB | Age: 50
Discharge: HOME | End: 2020-08-10

## 2020-08-10 VITALS
RESPIRATION RATE: 18 BRPM | BODY MASS INDEX: 38.51 KG/M2 | HEART RATE: 76 BPM | DIASTOLIC BLOOD PRESSURE: 77 MMHG | TEMPERATURE: 98.7 F | OXYGEN SATURATION: 98 % | HEIGHT: 70 IN | SYSTOLIC BLOOD PRESSURE: 114 MMHG | WEIGHT: 269 LBS

## 2020-08-10 DIAGNOSIS — R68.89 COLD INTOLERANCE: ICD-10-CM

## 2020-08-10 DIAGNOSIS — E78.5 DYSLIPIDEMIA: Primary | ICD-10-CM

## 2020-08-10 DIAGNOSIS — M43.16 SPONDYLOLISTHESIS, LUMBAR REGION: ICD-10-CM

## 2020-08-10 DIAGNOSIS — D50.9 IRON DEFICIENCY ANEMIA, UNSPECIFIED IRON DEFICIENCY ANEMIA TYPE: ICD-10-CM

## 2020-08-10 DIAGNOSIS — Z12.83 SCREENING EXAM FOR SKIN CANCER: ICD-10-CM

## 2020-08-10 DIAGNOSIS — E78.5 DYSLIPIDEMIA: ICD-10-CM

## 2020-08-10 DIAGNOSIS — J45.909 REACTIVE AIRWAY DISEASE WITHOUT COMPLICATION, UNSPECIFIED ASTHMA SEVERITY, UNSPECIFIED WHETHER PERSISTENT: ICD-10-CM

## 2020-08-10 DIAGNOSIS — M25.532 LEFT WRIST PAIN: ICD-10-CM

## 2020-08-10 PROBLEM — E66.9 OBESITY (BMI 35.0-39.9 WITHOUT COMORBIDITY): Status: ACTIVE | Noted: 2018-04-17

## 2020-08-10 LAB
ALBUMIN SERPL-MCNC: 3.8 G/DL (ref 3.5–5)
ALBUMIN/GLOB SERPL: 1.1 {RATIO} (ref 1.1–2.2)
ALP SERPL-CCNC: 96 U/L (ref 45–117)
ALT SERPL-CCNC: 21 U/L (ref 12–78)
ANION GAP SERPL CALC-SCNC: 10 MMOL/L (ref 5–15)
AST SERPL-CCNC: 11 U/L (ref 15–37)
BILIRUB SERPL-MCNC: 0.2 MG/DL (ref 0.2–1)
BUN SERPL-MCNC: 13 MG/DL (ref 6–20)
BUN/CREAT SERPL: 16 (ref 12–20)
CALCIUM SERPL-MCNC: 8.7 MG/DL (ref 8.5–10.1)
CHLORIDE SERPL-SCNC: 113 MMOL/L (ref 97–108)
CHOLEST SERPL-MCNC: 234 MG/DL
CO2 SERPL-SCNC: 18 MMOL/L (ref 21–32)
COMMENT, HOLDF: NORMAL
CREAT SERPL-MCNC: 0.83 MG/DL (ref 0.55–1.02)
ERYTHROCYTE [DISTWIDTH] IN BLOOD BY AUTOMATED COUNT: 12.4 % (ref 11.5–14.5)
FERRITIN SERPL-MCNC: 12 NG/ML (ref 26–388)
GLOBULIN SER CALC-MCNC: 3.5 G/DL (ref 2–4)
GLUCOSE SERPL-MCNC: 84 MG/DL (ref 65–100)
HCT VFR BLD AUTO: 41.3 % (ref 35–47)
HDLC SERPL-MCNC: 38 MG/DL
HDLC SERPL: 6.2 {RATIO} (ref 0–5)
HGB BLD-MCNC: 13.4 G/DL (ref 11.5–16)
IRON SATN MFR SERPL: 25 % (ref 20–50)
IRON SERPL-MCNC: 96 UG/DL (ref 35–150)
LDLC SERPL CALC-MCNC: 165.6 MG/DL (ref 0–100)
LIPID PROFILE,FLP: ABNORMAL
MCH RBC QN AUTO: 29.2 PG (ref 26–34)
MCHC RBC AUTO-ENTMCNC: 32.4 G/DL (ref 30–36.5)
MCV RBC AUTO: 90 FL (ref 80–99)
NRBC # BLD: 0 K/UL (ref 0–0.01)
NRBC BLD-RTO: 0 PER 100 WBC
PLATELET # BLD AUTO: 279 K/UL (ref 150–400)
PMV BLD AUTO: 11.3 FL (ref 8.9–12.9)
POTASSIUM SERPL-SCNC: 4.3 MMOL/L (ref 3.5–5.1)
PROT SERPL-MCNC: 7.3 G/DL (ref 6.4–8.2)
RBC # BLD AUTO: 4.59 M/UL (ref 3.8–5.2)
SAMPLES BEING HELD,HOLD: NORMAL
SODIUM SERPL-SCNC: 141 MMOL/L (ref 136–145)
T4 FREE SERPL-MCNC: 0.9 NG/DL (ref 0.8–1.5)
TIBC SERPL-MCNC: 384 UG/DL (ref 250–450)
TRIGL SERPL-MCNC: 152 MG/DL (ref ?–150)
TSH SERPL DL<=0.05 MIU/L-ACNC: 1.89 UIU/ML (ref 0.36–3.74)
VLDLC SERPL CALC-MCNC: 30.4 MG/DL
WBC # BLD AUTO: 3.6 K/UL (ref 3.6–11)

## 2020-08-10 PROCEDURE — 99214 OFFICE O/P EST MOD 30 MIN: CPT | Performed by: INTERNAL MEDICINE

## 2020-08-10 NOTE — PROGRESS NOTES
HISTORY OF PRESENT ILLNESS    New patient to my practice, referred to me by her . Prior medical care has been from Dr. Polo Martinez. 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  her  past medical history was reviewed, discussed, and summarized in the list below. She is accompanied by her , Pratibha Park. She has 2 biologic children and 2 stepchildren. Reports pain of her left forearm for about 2 months. She fell down and landed with her left wrist extended and had some immediate pain of the forearm. No significant bruising or bleeding. It still hurts a lot for her to flex and extend her wrist.  She is wearing a wrist brace at night. History of migraine headaches. Has been followed by Dr. Michael Babcock. Has been taking generic topiramate with reasonable control of headaches. She does state that she still does have some occasional headaches which are relieved with Relpax. History of iron deficiency anemia. She has been taking iron long-term for this. Presume secondary to heavy menses. Was given Mirena but did not like it. No other medications. Asks if she still needs to take iron. No prior history of colonoscopy or history of GI bleeding. Hyperlipidemia   Taking no meds  No new myalgias, no joint pains, no weakness  No TIA's, no chest pain on exertion, no dyspnea on exertion, no swelling of ankles.    Lab Results   Component Value Date/Time    Cholesterol, total 206 (H) 08/31/2018 11:35 AM    HDL Cholesterol 40 08/31/2018 11:35 AM    LDL, calculated 140 (H) 08/31/2018 11:35 AM    VLDL, calculated 26 08/31/2018 11:35 AM    Triglyceride 131 08/31/2018 11:35 AM    CHOL/HDL Ratio 5.0 10/29/2009 11:18 AM         Review of Systems   All other systems reviewed and are negative, except as noted in HPI      Past Medical and Surgical History  Past Medical History:   Diagnosis Date    Chest pain     atypical saw cardiology Dr. Elva Rob Dyslipidemia     mild    Iron deficiency anemia     chronic. takes iron     Migraine     Dr. Tuan Tran Obesity (BMI 35.0-39.9 without comorbidity) 2018    Pap smear for cervical cancer screening     Dr. Amira Angulo.  RAD (reactive airway disease) 2020    Dr. Mary Anne Pepe, lumbar region 2018    saw Dr. Nupur Roe, Dr. Janis Greco, Dr. Rose Isbell. hx spinal surgery ,         has a past surgical history that includes hx  section; hx tubal ligation; hx lumbar diskectomy (); and hx lumbar fusion (N/A, 2018). Current Outpatient Medications   Medication Sig    topiramate (TOPAMAX) 200 mg tablet TAKE 1 TABLET EVERY MORNING, AND 1 AND 1/2 TABLETS (=300MG) EVERY EVENING FOR MIGRAINE PREVENTION    Relpax 40 mg tablet TAKE 1 TABLET BY MOUTH ONCE AS NEEDED. MAY REPEAT IN 2 HOURS IF NEEDED  Indications: a migraine headache    ferrous sulfate 325 mg (65 mg iron) tablet Take 325 mg by mouth Daily (before breakfast). No current facility-administered medications for this visit. reports that she has never smoked. She has never used smokeless tobacco. She reports current alcohol use of about 1.0 standard drinks of alcohol per week. She reports that she does not use drugs. family history includes Cancer in her paternal grandmother; Depression in her father and mother; Diabetes in her maternal grandfather; Hypertension in her maternal grandfather; No Known Problems in her sister and sister; Other in her sister; Suicide in her father. Physical Exam   Nursing note and vitals reviewed. Blood pressure 114/77, pulse 76, temperature 98.7 °F (37.1 °C), temperature source Oral, resp. rate 18, height 5' 10\" (1.778 m), weight 269 lb (122 kg), last menstrual period 2020, SpO2 98 %. Constitutional: oriented to person, place, and time. No distress. Eyes: Conjunctivae are normal.   HEENT:  No cervical lymphadenopathy. No thyroid nodules or goiter  Cardiovascular: Normal rate. Regular rhythm, no murmurs, rubs.    No edema  Pulmonary/Chest: Effort normal. clear to auscultation  Abdominal: soft, non-tender, non-distended  Musculoskeletal:   Left distal forearm with tenderness dorsally and mild tenderness on the ventral side as well. No palpable bony abnormality. No ecchymosis. 5 out of 5  strength. Pain is improved with massage. Neurological: Alert and oriented to person, place. Cranial nerves grossly intact. Normal gait   Skin: No rash noted. Psychiatric: Appears anxious, somewhat mildly delayed in thought process. ASSESSMENT and PLAN  Diagnoses and all orders for this visit:    1. Dyslipidemia   The patient is asked to make an attempt to improve diet and exercise patterns to aid in medical management of this problem. No med indications  -     METABOLIC PANEL, COMPREHENSIVE; Future  -     LIPID PANEL; Future    2. Left wrist pain   Evaluate possibly a subtle fracture. Likely a sprain. Continue wrist brace. May consider orthopedic consultation. -     XR FOREARM LT AP/LAT; Future    3. Iron deficiency anemia, unspecified iron deficiency anemia type   presumably second to menorrhagia. May be able to stop iron and repeat labs in 3 to 6 months.  -     IRON PROFILE; Future  -     FERRITIN; Future  -     CBC W/O DIFF; Future    4. Cold intolerance  Chronic cold intolerance. Check iron levels and thyroid function. Side effect of Topamax?  -     TSH 3RD GENERATION; Future  -     T4, FREE; Future    5. Reactive airway disease without complication, unspecified asthma severity, unspecified whether persistent  Saw pulmonary. Was given Symbicort. I do not have exact records. She is him symptomatic now. Will monitor for now. 6. Spondylolisthesis, lumbar region  Fortunately, this is much improved after her most recent surgery.     7. Screening exam for skin cancer  -     REFERRAL TO DERMATOLOGY        lab results and schedule of future lab studies reviewed with patient  reviewed medications and side effects in detail

## 2020-08-10 NOTE — PROGRESS NOTES
Shannan Espinoza is a 53 y.o. female    Chief Complaint   Patient presents with    New Patient   [address] Maintenance Due   Topic Date Due    PAP AKA CERVICAL CYTOLOGY  05/01/1992    Influenza Age 5 to Adult  08/01/2020       Visit Vitals  /77 (BP 1 Location: Left arm, BP Patient Position: Sitting)   Pulse 76   Temp 98.7 °F (37.1 °C) (Oral)   Resp 18   Ht 5' 10\" (1.778 m)   Wt 269 lb (122 kg)   SpO2 98%   BMI 38.60 kg/m²       1. Have you been to the ER, urgent care clinic since your last visit? Hospitalized since your last visit? No    2. Have you seen or consulted any other health care providers outside of the [address] [place] [address] since your last visit? Include any pap smears or colon screening.  No

## 2020-09-24 NOTE — TELEPHONE ENCOUNTER
[facility] called and Rx clarified. Pharm tech had typed in wrong information. X Size Of Lesion In Cm (Optional): 0

## 2021-01-07 DIAGNOSIS — G43.009 MIGRAINE WITHOUT AURA AND WITHOUT STATUS MIGRAINOSUS, NOT INTRACTABLE: ICD-10-CM

## 2021-01-07 NOTE — TELEPHONE ENCOUNTER
----- Message from Abram French sent at 1/7/2021  9:35 AM EST -----  Regarding: : Dr Peyotn España  Caller's first and last name: N/A  Reason for call: Pt wants to discuss changing her Well Pack Rx  Callback required yes/no and why: yes, discuss changing her Well Pack Rx  Best contact number(s): [phone]  Details to clarify the request: Pt wants to discuss changing her Well Pack Rx to the generic

## 2021-01-09 RX ORDER — ELETRIPTAN HYDROBROMIDE 40 MG/1
TABLET, FILM COATED ORAL
Qty: 36 TAB | Refills: 0 | Status: SHIPPED | OUTPATIENT
Start: 2021-01-09 | End: 2021-02-12 | Stop reason: SDUPTHER

## 2021-02-12 ENCOUNTER — OFFICE VISIT (OUTPATIENT)
Dept: NEUROLOGY | Age: 51
End: 2021-02-12
Payer: COMMERCIAL

## 2021-02-12 VITALS
SYSTOLIC BLOOD PRESSURE: 130 MMHG | OXYGEN SATURATION: 100 % | TEMPERATURE: 97.5 F | DIASTOLIC BLOOD PRESSURE: 70 MMHG | HEART RATE: 84 BPM

## 2021-02-12 DIAGNOSIS — G43.009 MIGRAINE WITHOUT AURA AND WITHOUT STATUS MIGRAINOSUS, NOT INTRACTABLE: Primary | ICD-10-CM

## 2021-02-12 PROCEDURE — 99214 OFFICE O/P EST MOD 30 MIN: CPT | Performed by: SPECIALIST

## 2021-02-12 RX ORDER — ELETRIPTAN HYDROBROMIDE 40 MG/1
TABLET, FILM COATED ORAL
Qty: 36 TAB | Refills: 0 | Status: SHIPPED | OUTPATIENT
Start: 2021-02-12 | End: 2021-04-08 | Stop reason: SDUPTHER

## 2021-02-12 RX ORDER — ERENUMAB-AOOE 70 MG/ML
70 INJECTION SUBCUTANEOUS ONCE
Qty: 1 EACH | Refills: 5 | Status: SHIPPED | OUTPATIENT
Start: 2021-02-12 | End: 2021-02-12

## 2021-02-12 RX ORDER — ERENUMAB-AOOE 70 MG/ML
70 INJECTION SUBCUTANEOUS ONCE
Qty: 1 EACH | Refills: 0 | Status: SHIPPED | COMMUNITY
Start: 2021-02-12 | End: 2021-02-12

## 2021-02-12 NOTE — PATIENT INSTRUCTIONS
Patient history reviewed patient examined. Will prescription the drug Aimovig as a new preventative option for her. May take up to 3 injection periods to know whether it works or not. We will keep the Topamax as is and also the Relpax. We could try for a higher dose of Aimovig if the initial low-dose version does not work. Revisit in 4 months to give her an adequate trial of this new product.

## 2021-02-12 NOTE — LETTER
2/12/2021 Patient: Leandra Prater YOB: 1970 Date of Visit: 2/12/2021 Venkata Layton MD 
[place] [address] [address] [facility] [address] [address] [address] [employer] Dear Venkata Layton MD, Thank you for referring Ms. Susana Lovell to [facility] for evaluation. My notes for this consultation are attached. If you have questions, please do not hesitate to call me. I look forward to following your patient along with you.  
 
 
Sincerely, 
 
Alma Nair MD

## 2021-02-12 NOTE — PROGRESS NOTES
Neurology Consult      Subjective:      Mj Riggs is a 53 y.o. female who comes in today with migraine headache follow-up. Is on Topamax 500 mg as a regular preventative drug option along with Relpax 40 mg as a rescue. In recent times has not found her headache control all that great. In fact, if I understood her correctly, it is a daily affair. It may be coming down to the generic that she is taking as opposed to a different one. We briefly highlighted the newer CGRP antagonist once a month preventive goals and I think at this point she is ready to make a stand and give it a try. She also seems to be very sure she can give herself a subcu injectable. Also went over the fact that some people find that these products may not deliver their best performance for 1 to but certainly by 3 injection cycles will know whether this is a success or not. Otherwise I have briefly talked about the competitors Ajovy and Emgality and also the quarterly product Vyepti. Also brought up to speed that the CGRP antagonists are used as rescue's such as Nurtec and Ubrelvy. Was anxious not to depart from the Topamax and I understand. In fact what mine normal procedure is is to just simply add the new product on to the existing treatments again and see what goes on instead of a 2 process scheme whereby we may not know what we are accomplishing at least initially in that respect. Would like to see her back in about 4 months as I think we will know whether the Aimovig at 70 or 140 mg is going to perform. She seemed to be more upbeat at the prospects of better headache control and no small wonder. Exam looks normal as always and hopefully we can find a defined solution for better headache control. Current Outpatient Medications   Medication Sig Dispense Refill    eletriptan (Relpax) 40 mg tablet TAKE 1 TABLET BY MOUTH ONCE AS NEEDED.  MAY REPEAT IN 2 HOURS IF NEEDED  Indications: a migraine headache 36 Tab 0    erenumab-aooe (Aimovig Autoinjector) 70 mg/mL injection 1 mL by SubCUTAneous route once for 1 dose. 1 Each 5    topiramate (TOPAMAX) 200 mg tablet TAKE 1 TABLET EVERY MORNING, AND 1 AND 1/2 TABLETS (=300MG) EVERY EVENING FOR MIGRAINE PREVENTION 225 Tab 0    ferrous sulfate 325 mg (65 mg iron) tablet Take 325 mg by mouth Daily (before breakfast). Allergies   Allergen Reactions    Topamax [Topiramate] Other (comments)     Low grade fever   Only with brand name ok with generic     Past Medical History:   Diagnosis Date    Chest pain     atypical saw cardiology Dr. Rc Dover Dyslipidemia     mild    Iron deficiency anemia     chronic. takes iron     Migraine     Dr. Osmin Vuong Obesity (BMI 35.0-39.9 without comorbidity) 2018    Pap smear for cervical cancer screening     Dr. Cassi Saenz.  RAD (reactive airway disease) 2020    Dr. Umm Dial, lumbar region 2018    saw Dr. Geoffrey Diaz, Dr. Caleb Mccullough, Dr. White Lav. hx spinal surgery ,       Past Surgical History:   Procedure Laterality Date    HX  SECTION      HX LUMBAR DISKECTOMY      L 5 S1    HX LUMBAR FUSION N/A 2018    L4-S1 ALIF. Dr. Martha Juarez History     Socioeconomic History    Marital status:      Spouse name: Brian Houser Number of children: 2    Years of education: Not on file    Highest education level: Not on file   Occupational History     Employer: [address] Financial resource strain: Not on file    Food insecurity     Worry: Not on file     Inability: Not on file   [employer] needs     Medical: Not on file     Non-medical: Not on file   Tobacco Use    Smoking status: Never Smoker    Smokeless tobacco: Never Used   Substance and Sexual Activity    Alcohol use:  Yes     Alcohol/week: 1.0 standard drinks     Types: 1 Glasses of wine per week     Comment: x1 a month    Drug use: No    Sexual activity: Yes     Partners: Male Birth control/protection: Surgical   Lifestyle    Physical activity     Days per week: Not on file     Minutes per session: Not on file    Stress: Not on file   Relationships    Social connections     Talks on phone: Not on file     Gets together: Not on file     Attends Hoahaoism service: Not on file     Active member of club or organization: Not on file     Attends meetings of clubs or organizations: Not on file     Relationship status: Not on file    Intimate partner violence     Fear of current or ex partner: Not on file     Emotionally abused: Not on file     Physically abused: Not on file     Forced sexual activity: Not on file   Other Topics Concern    Not on file   Social History Narrative    Not on file      Family History   Problem Relation Age of Onset    Suicide Father     Depression Father     Depression Mother     Other Sister         Kidney Issues    No Known Problems Sister     No Known Problems Sister     Hypertension Maternal Grandfather     Diabetes Maternal Grandfather     Cancer Paternal Grandmother         lung      Visit Vitals  /70   Pulse 84   Temp 97.5 °F (36.4 °C)   SpO2 100%        Review of Systems:   A comprehensive review of systems was negative except for that written in the HPI. Neuro Exam:     Appearance: The patient is well developed, well nourished, provides a coherent history and is in no acute distress. Mental Status: Oriented to time, place and person. Mood and affect appropriate. Cranial Nerves:   Intact visual fields. Fundi are benign. MONSTER, EOM's full, no nystagmus, no ptosis. Facial sensation is normal. Corneal reflexes are intact. Facial movement is symmetric. Hearing is normal bilaterally. Palate is midline with normal sternocleidomastoid and trapezius muscles are normal. Tongue is midline. Motor:  5/5 strength in upper and lower proximal and distal muscles. Normal bulk and tone. No fasciculations.    Reflexes:   Deep tendon reflexes 2+/4 and symmetrical.   Sensory:   Normal to touch, pinprick and vibration. Gait:  Normal gait. Tremor:   No tremor noted. Cerebellar:  No cerebellar signs present. Neurovascular:  Normal heart sounds and regular rhythm, peripheral pulses intact, and no carotid bruits. Assessment:   Migraine headaches. We will try to improve patient's headache treatment outcome with a new preventative product Aimovig 70 mg. Was told it may take 2 or 3 injection timeframes to figure out whether this can be helpful or not. At that point would try something different if it was not a performer. We will keep the Topamax as is and renew the Relpax by request.  I will see her back in 4 months and not all to give her plenty of time and us to make a decision on which drug preventative therapy we will use. Plan:   Revisit 4 months.   Signed by :  Ivonne Lr MD

## 2021-02-24 ENCOUNTER — OFFICE VISIT (OUTPATIENT)
Dept: INTERNAL MEDICINE CLINIC | Age: 51
End: 2021-02-24
Payer: COMMERCIAL

## 2021-02-24 VITALS
WEIGHT: 273.2 LBS | OXYGEN SATURATION: 99 % | SYSTOLIC BLOOD PRESSURE: 117 MMHG | DIASTOLIC BLOOD PRESSURE: 69 MMHG | TEMPERATURE: 98.4 F | BODY MASS INDEX: 39.11 KG/M2 | RESPIRATION RATE: 14 BRPM | HEART RATE: 66 BPM | HEIGHT: 70 IN

## 2021-02-24 DIAGNOSIS — L21.9 SEBORRHEIC DERMATITIS: ICD-10-CM

## 2021-02-24 DIAGNOSIS — M77.8 LEFT SHOULDER TENDONITIS: Primary | ICD-10-CM

## 2021-02-24 PROCEDURE — 99213 OFFICE O/P EST LOW 20 MIN: CPT | Performed by: INTERNAL MEDICINE

## 2021-02-24 RX ORDER — DICLOFENAC SODIUM 75 MG/1
75 TABLET, DELAYED RELEASE ORAL
Qty: 60 TAB | Refills: 1 | Status: SHIPPED | OUTPATIENT
Start: 2021-02-24 | End: 2021-05-10 | Stop reason: ALTCHOICE

## 2021-02-24 RX ORDER — ERENUMAB-AOOE 70 MG/ML
70 INJECTION SUBCUTANEOUS
Qty: 1 SYRINGE | Refills: 5 | Status: SHIPPED | OUTPATIENT
Start: 2021-02-24 | End: 2021-05-10 | Stop reason: ALTCHOICE

## 2021-02-24 RX ORDER — ELETRIPTAN HYDROBROMIDE 40 MG/1
40 TABLET, FILM COATED ORAL
Qty: 9 TAB | Refills: 5 | Status: SHIPPED | OUTPATIENT
Start: 2021-02-24 | End: 2021-02-24

## 2021-02-24 RX ORDER — HYDROCORTISONE VALERATE 2 MG/G
CREAM TOPICAL 2 TIMES DAILY
Qty: 15 G | Refills: 3 | Status: SHIPPED | OUTPATIENT
Start: 2021-02-24

## 2021-02-24 RX ORDER — ERENUMAB-AOOE 70 MG/ML
INJECTION SUBCUTANEOUS
COMMUNITY
Start: 2021-02-12 | End: 2021-02-24 | Stop reason: SDUPTHER

## 2021-02-24 NOTE — PATIENT INSTRUCTIONS
Rotator Cuff: Exercises Introduction Here are some examples of exercises for you to try. The exercises may be suggested for a condition or for rehabilitation. Start each exercise slowly. Ease off the exercises if you start to have pain. You will be told when to start these exercises and which ones will work best for you. How to do the exercises Pendulum swing If you have pain in your back, do not do this exercise. 1. Hold on to a table or the back of a chair with your good arm. Then bend forward a little and let your sore arm hang straight down. This exercise does not use the arm muscles. Rather, use your legs and your hips to create movement that makes your arm swing freely. 2. Use the movement from your hips and legs to guide the slightly swinging arm back and forth like a pendulum (or elephant trunk). Then guide it in circles that start small (about the size of a dinner plate). Make the circles a bit larger each day, as your pain allows. 3. Do this exercise for 5 minutes, 5 to 7 times each day. 4. As you have less pain, try bending over a little farther to do this exercise. This will increase the amount of movement at your shoulder. Posterior stretching exercise 1. Hold the elbow of your injured arm with your other hand. 2. Use your hand to pull your injured arm gently up and across your body. You will feel a gentle stretch across the back of your injured shoulder. 3. Hold for at least 15 to 30 seconds. Then slowly lower your arm. 4. Repeat 2 to 4 times. Up-the-back stretch Your doctor or physical therapist may want you to wait to do this stretch until you have regained most of your range of motion and strength. You can do this stretch in different ways. Hold any of these stretches for at least 15 to 30 seconds. Repeat them 2 to 4 times. 1. Light stretch: Put your hand in your back pocket. Let it rest there to stretch your shoulder. 2. Moderate stretch: With your other hand, hold your injured arm (palm outward) behind your back by the wrist. Pull your arm up gently to stretch your shoulder. 3. Advanced stretch: Put a towel over your other shoulder. Put the hand of your injured arm behind your back. Now hold the back end of the towel. With the other hand, hold the front end of the towel in front of your body. Pull gently on the front end of the towel. This will bring your hand farther up your back to stretch your shoulder. Overhead stretch 1. Standing about an arm's length away, grasp onto a solid surface. You could use a countertop, a doorknob, or the back of a sturdy chair. 2. With your knees slightly bent, bend forward with your arms straight. Lower your upper body, and let your shoulders stretch. 3. As your shoulders are able to stretch farther, you may need to take a step or two backward. 4. Hold for at least 15 to 30 seconds. Then stand up and relax. If you had stepped back during your stretch, step forward so you can keep your hands on the solid surface. 5. Repeat 2 to 4 times. Shoulder flexion (lying down) To make a wand for this exercise, use a piece of PVC pipe or a broom handle with the broom removed. Make the wand about a foot wider than your shoulders. 1. Lie on your back, holding a wand with both hands. Your palms should face down as you hold the wand. 2. Keeping your elbows straight, slowly raise your arms over your head. Raise them until you feel a stretch in your shoulders, upper back, and chest. 
3. Hold for 15 to 30 seconds. 4. Repeat 2 to 4 times. Shoulder rotation (lying down) To make a wand for this exercise, use a piece of PVC pipe or a broom handle with the broom removed. Make the wand about a foot wider than your shoulders. 1. Lie on your back. Hold a wand with both hands with your elbows bent and palms up. 2. Keep your elbows close to your body, and move the wand across your body toward the sore arm. 3. Hold for 8 to 12 seconds. 4. Repeat 2 to 4 times. Wall climbing (to the side) Avoid any movement that is straight to your side, and be careful not to arch your back. Your arm should stay about 30 degrees to the front of your side. 1. Stand with your side to a wall so that your fingers can just touch it at an angle about 30 degrees toward the front of your body. 2. Walk the fingers of your injured arm up the wall as high as pain permits. Try not to shrug your shoulder up toward your ear as you move your arm up. 3. Hold that position for a count of at least 15 to 20. 
4. Walk your fingers back down to the starting position. 5. Repeat at least 2 to 4 times. Try to reach higher each time. Wall climbing (to the front) During this stretching exercise, be careful not to arch your back. 1. Face a wall, and stand so your fingers can just touch it. 2. Keeping your shoulder down, walk the fingers of your injured arm up the wall as high as pain permits. (Don't shrug your shoulder up toward your ear.) 3. Hold your arm in that position for at least 15 to 30 seconds. 4. Slowly walk your fingers back down to where you started. 5. Repeat at least 2 to 4 times. Try to reach higher each time. Shoulder blade squeeze 1. Stand with your arms at your sides, and squeeze your shoulder blades together. Do not raise your shoulders up as you squeeze. 2. Hold 6 seconds. 3. Repeat 8 to 12 times. Scapular exercise: Arm reach 1. Lie flat on your back. This exercise is a very slight motion that starts with your arms raised (elbows straight, arms straight). 2. From this position, reach higher toward the vilma or ceiling. Keep your elbows straight. All motion should be from your shoulder blade only. 3. Relax your arms back to where you started. 4. Repeat 8 to 12 times. Arm raise to the side During this strengthening exercise, your arm should stay about 30 degrees to the front of your side. 1. Slowly raise your injured arm to the side, with your thumb facing up. Raise your arm 60 degrees at the most (shoulder level is 90 degrees). 2. Hold the position for 3 to 5 seconds. Then lower your arm back to your side. If you need to, bring your \"good\" arm across your body and place it under the elbow as you lower your injured arm. Use your good arm to keep your injured arm from dropping down too fast. 
3. Repeat 8 to 12 times. 4. When you first start out, don't hold any extra weight in your hand. As you get stronger, you may use a 1-pound to 2-pound dumbbell or a small can of food. Shoulder flexor and extensor exercise These are isometric exercises. That means you contract your muscles without actually moving. 1. Push forward (flex): Stand facing a wall or doorjamb, about 6 inches or less back. Hold your injured arm against your body. Make a closed fist with your thumb on top. Then gently push your hand forward into the wall with about 25% to 50% of your strength. Don't let your body move backward as you push. Hold for about 6 seconds. Relax for a few seconds. Repeat 8 to 12 times. 2. Push backward (extend): Stand with your back flat against a wall. Your upper arm should be against the wall, with your elbow bent 90 degrees (your hand straight ahead). Push your elbow gently back against the wall with about 25% to 50% of your strength. Don't let your body move forward as you push. Hold for about 6 seconds. Relax for a few seconds. Repeat 8 to 12 times. Scapular exercise: Wall push-ups This exercise is best done with your fingers somewhat turned out, rather than straight up and down. 1. Stand facing a wall, about 12 inches to 18 inches away[address] 2. [address] your hands on the wall at shoulder height. 3. Slowly bend your elbows and bring your face to the wall. Keep your back and hips straight. 4. Push back to where you started. 5. Repeat 8 to 12 times. 6. When you can do this exercise against a wall comfortably, you can try it against a counter. You can then slowly progress to the end of a couch, then to a sturdy chair, and finally to the floor. Scapular exercise: Retraction For this exercise, you will need elastic exercise material, such as surgical tubing or Thera-Band. 1. Put the band around a solid object at about waist level. (A bedpost will work well.) Each hand should hold an end of the band. 2. With your elbows at your sides and bent to 90 degrees, pull the band back. Your shoulder blades should move toward each other. Then move your arms back where you started. 3. Repeat 8 to 12 times. 4. If you have good range of motion in your shoulders, try this exercise with your arms lifted out to the sides. Keep your elbows at a 90-degree angle. Raise the elastic band up to about shoulder level. Pull the band back to move your shoulder blades toward each other. Then move your arms back where you started. Internal rotator strengthening exercise 1. Start by tying a piece of elastic exercise material to a doorknob. You can use surgical tubing or Thera-Band. 2. Stand or sit with your shoulder relaxed and your elbow bent 90 degrees. Your upper arm should rest comfortably against your side. Squeeze a rolled towel between your elbow and your body for comfort. This will help keep your arm at your side. 3. Hold one end of the elastic band in the hand of the painful arm. 4. Slowly rotate your forearm toward your body until it touches your belly. Slowly move it back to where you started. 5. Keep your elbow and upper arm firmly tucked against the towel roll or at your side. 6. Repeat 8 to 12 times. External rotator strengthening exercise 1. Start by tying a piece of elastic exercise material to a doorknob. You can use surgical tubing or Thera-Band. (You may also hold one end of the band in each hand.) 2. Stand or sit with your shoulder relaxed and your elbow bent 90 degrees. Your upper arm should rest comfortably against your side. Squeeze a rolled towel between your elbow and your body for comfort. This will help keep your arm at your side. 3. Hold one end of the elastic band with the hand of the painful arm. 4. Start with your forearm across your belly. Slowly rotate the forearm out away from your body. Keep your elbow and upper arm tucked against the towel roll or the side of your body until you begin to feel tightness in your shoulder. Slowly move your arm back to where you started. 5. Repeat 8 to 12 times. Follow-up care is a key part of your treatment and safety. Be sure to make and go to all appointments, and call your doctor if you are having problems. It's also a good idea to know your test results and keep a list of the medicines you take. Where can you learn more? Go to http://www.[employer].com/ Enter Kevin Phamana in the search box to learn more about \"Rotator Cuff: Exercises. \" Current as of: March 2, 2020               Content Version: 12.6 © 5434-9250 [employer]. Care instructions adapted under license by [employer] (which disclaims liability or warranty for this information). If you have questions about a medical condition or this instruction, always ask your healthcare professional. [place] [address] any warranty or liability for your use of this information.

## 2021-02-25 NOTE — PROGRESS NOTES
HISTORY OF PRESENT ILLNESS    Chief Complaint   Patient presents with    Shoulder Pain     Left - shoots down arm - 1 month - does not have normal range of motion.  Medication Refill     Need refill on med for around eyes - hydrocortisone with valerate. Presents with left shoulder pain. Onset was 1 month ago. Denies any injuries. Pain radiates to her left forearm and elbow at times. Denies any weakness or pain of the hand. She has no prior shoulder injuries. No history of neck pain. She does have a history of facet hypertrophy of her cervical spine based on x-ray in . History of lumbar discectomy and fusion but no other significant cervical spine disease. Has tried ibuprofen with partial improvement. Hurts to lie down on it. Denies any chest pain. No shortness of breath. She was seen by me for left forearm pain in August after she had an injury. Did not have x-ray done. That has seemed to improve. Reports a rash around her lateral eyelids. This is chronic and intermittent. Onset was years ago. She has been using hydrocortisone valerate with some improvement and would like a refill. Denies any current symptoms. Review of Systems   All other systems reviewed and are negative, except as noted in HPI    Past Medical and Surgical History   has a past medical history of Chest pain, Dyslipidemia, Facet hypertrophy of cervical region (), Iron deficiency anemia, Migraine, Obesity (BMI 35.0-39.9 without comorbidity) (2018), Pap smear for cervical cancer screening, RAD (reactive airway disease) (2020), and Spondylolisthesis, lumbar region (2018). has a past surgical history that includes hx  section; hx tubal ligation; hx lumbar diskectomy (); and hx lumbar fusion (N/A, 2018). reports that she has never smoked. She has never used smokeless tobacco. She reports current alcohol use of about 1.0 standard drinks of alcohol per week.  She reports that she does not use drugs. family history includes Cancer in her paternal grandmother; Depression in her father and mother; Diabetes in her maternal grandfather; Hypertension in her maternal grandfather; No Known Problems in her sister and sister; Other in her sister; Suicide in her father. Physical Exam   Nursing note and vitals reviewed. Blood pressure 117/69, pulse 66, temperature 98.4 °F (36.9 °C), temperature source Oral, resp. rate 14, height 5' 10\" (1.778 m), weight 273 lb 3.2 oz (123.9 kg), last menstrual period 02/03/2021, SpO2 99 %. Constitutional: In no distress. Eyes: Conjunctivae are normal.  HEENT:  No LAD or thyromegaly   Cardiovascular: Normal rate. regular rhythm. No murmurs  No edema  Pulmonary/Chest: Effort normal. clear to ausculation blaterally  Musculoskeletal:  no edema. Left shoulder full range of motion. She does have increased discomfort with internal rotation and trying to reach behind her back. Not a lot of discomfort with external rotation. Negative empty can sign. Some mild tenderness of bicipital tendon and proximal anterior deltoid. Abd:  Neurological: Alert and oriented. Grossly intact cranial nerves and motor function. Skin: No visible rash noted. Psychiatric: Normal mood and affect. Behavior is normal.     ASSESSMENT and PLAN  Diagnoses and all orders for this visit:    1. Left shoulder tendonitis  I suspect this is some degree of bicipital tendinitis, possibly deltoid strain, possibly bursitis. Recommend stretching exercises. Trial of diclofenac for 2 weeks. Can refer to PT anytime. She had a bad experience with physical therapy on her back and prefers not to go at this point. Could consider imaging or referral for orthopedics if symptoms are not improving.  -     diclofenac EC (VOLTAREN) 75 mg EC tablet; Take 1 Tab by mouth two (2) times daily as needed for Pain.     2. Seborrheic dermatitis  -     Refill  Currently asymptomatic  hydrocortisone valerate (WESTCORT) 0.2 % topical cream; Apply  to affected area two (2) times a day. use thin layer. For seborrhea eyelids        lab results and schedule of future lab studies reviewed with patient  reviewed medications and side effects in detail    Return to clinic for further evaluation if new symptoms develop or if current symptoms worsen or fail to resolve. Patient Instructions          Rotator Cuff: Exercises  Introduction  Here are some examples of exercises for you to try. The exercises may be suggested for a condition or for rehabilitation. Start each exercise slowly. Ease off the exercises if you start to have pain. You will be told when to start these exercises and which ones will work best for you. How to do the exercises  Pendulum swing   If you have pain in your back, do not do this exercise. 1. Hold on to a table or the back of a chair with your good arm. Then bend forward a little and let your sore arm hang straight down. This exercise does not use the arm muscles. Rather, use your legs and your hips to create movement that makes your arm swing freely. 2. Use the movement from your hips and legs to guide the slightly swinging arm back and forth like a pendulum (or elephant trunk). Then guide it in circles that start small (about the size of a dinner plate). Make the circles a bit larger each day, as your pain allows. 3. Do this exercise for 5 minutes, 5 to 7 times each day. 4. As you have less pain, try bending over a little farther to do this exercise. This will increase the amount of movement at your shoulder. Posterior stretching exercise   1. Hold the elbow of your injured arm with your other hand. 2. Use your hand to pull your injured arm gently up and across your body. You will feel a gentle stretch across the back of your injured shoulder. 3. Hold for at least 15 to 30 seconds. Then slowly lower your arm. 4. Repeat 2 to 4 times.     Up-the-back stretch   Your doctor or physical therapist may want you to wait to do this stretch until you have regained most of your range of motion and strength. You can do this stretch in different ways. Hold any of these stretches for at least 15 to 30 seconds. Repeat them 2 to 4 times. 1. Light stretch: Put your hand in your back pocket. Let it rest there to stretch your shoulder. 2. Moderate stretch: With your other hand, hold your injured arm (palm outward) behind your back by the wrist. Pull your arm up gently to stretch your shoulder. 3. Advanced stretch: Put a towel over your other shoulder. Put the hand of your injured arm behind your back. Now hold the back end of the towel. With the other hand, hold the front end of the towel in front of your body. Pull gently on the front end of the towel. This will bring your hand farther up your back to stretch your shoulder. Overhead stretch   1. Standing about an arm's length away, grasp onto a solid surface. You could use a countertop, a doorknob, or the back of a sturdy chair. 2. With your knees slightly bent, bend forward with your arms straight. Lower your upper body, and let your shoulders stretch. 3. As your shoulders are able to stretch farther, you may need to take a step or two backward. 4. Hold for at least 15 to 30 seconds. Then stand up and relax. If you had stepped back during your stretch, step forward so you can keep your hands on the solid surface. 5. Repeat 2 to 4 times. Shoulder flexion (lying down)   To make a wand for this exercise, use a piece of PVC pipe or a broom handle with the broom removed. Make the wand about a foot wider than your shoulders. 1. Lie on your back, holding a wand with both hands. Your palms should face down as you hold the wand. 2. Keeping your elbows straight, slowly raise your arms over your head. Raise them until you feel a stretch in your shoulders, upper back, and chest.  3. Hold for 15 to 30 seconds. 4. Repeat 2 to 4 times.     Shoulder rotation (lying down)   To make a wand for this exercise, use a piece of PVC pipe or a broom handle with the broom removed. Make the wand about a foot wider than your shoulders. 1. Lie on your back. Hold a wand with both hands with your elbows bent and palms up. 2. Keep your elbows close to your body, and move the wand across your body toward the sore arm. 3. Hold for 8 to 12 seconds. 4. Repeat 2 to 4 times. Wall climbing (to the side)   Avoid any movement that is straight to your side, and be careful not to arch your back. Your arm should stay about 30 degrees to the front of your side. 1. Stand with your side to a wall so that your fingers can just touch it at an angle about 30 degrees toward the front of your body. 2. Walk the fingers of your injured arm up the wall as high as pain permits. Try not to shrug your shoulder up toward your ear as you move your arm up. 3. Hold that position for a count of at least 15 to 20.  4. Walk your fingers back down to the starting position. 5. Repeat at least 2 to 4 times. Try to reach higher each time. Wall climbing (to the front)   During this stretching exercise, be careful not to arch your back. 1. Face a wall, and stand so your fingers can just touch it. 2. Keeping your shoulder down, walk the fingers of your injured arm up the wall as high as pain permits. (Don't shrug your shoulder up toward your ear.)  3. Hold your arm in that position for at least 15 to 30 seconds. 4. Slowly walk your fingers back down to where you started. 5. Repeat at least 2 to 4 times. Try to reach higher each time. Shoulder blade squeeze   1. Stand with your arms at your sides, and squeeze your shoulder blades together. Do not raise your shoulders up as you squeeze. 2. Hold 6 seconds. 3. Repeat 8 to 12 times. Scapular exercise: Arm reach   1. Lie flat on your back.  This exercise is a very slight motion that starts with your arms raised (elbows straight, arms straight). 2. From this position, reach higher toward the vilma or ceiling. Keep your elbows straight. All motion should be from your shoulder blade only. 3. Relax your arms back to where you started. 4. Repeat 8 to 12 times. Arm raise to the side   During this strengthening exercise, your arm should stay about 30 degrees to the front of your side. 1. Slowly raise your injured arm to the side, with your thumb facing up. Raise your arm 60 degrees at the most (shoulder level is 90 degrees). 2. Hold the position for 3 to 5 seconds. Then lower your arm back to your side. If you need to, bring your \"good\" arm across your body and place it under the elbow as you lower your injured arm. Use your good arm to keep your injured arm from dropping down too fast.  3. Repeat 8 to 12 times. 4. When you first start out, don't hold any extra weight in your hand. As you get stronger, you may use a 1-pound to 2-pound dumbbell or a small can of food. Shoulder flexor and extensor exercise   These are isometric exercises. That means you contract your muscles without actually moving. 1. Push forward (flex): Stand facing a wall or doorjamb, about 6 inches or less back. Hold your injured arm against your body. Make a closed fist with your thumb on top. Then gently push your hand forward into the wall with about 25% to 50% of your strength. Don't let your body move backward as you push. Hold for about 6 seconds. Relax for a few seconds. Repeat 8 to 12 times. 2. Push backward (extend): Stand with your back flat against a wall. Your upper arm should be against the wall, with your elbow bent 90 degrees (your hand straight ahead). Push your elbow gently back against the wall with about 25% to 50% of your strength. Don't let your body move forward as you push. Hold for about 6 seconds. Relax for a few seconds. Repeat 8 to 12 times.     Scapular exercise: Wall push-ups   This exercise is best done with your fingers somewhat turned out, rather than straight up and down. 1. Stand facing a wall, about 12 inches to 18 inches away. 2. Place your hands on the wall at shoulder height. 3. Slowly bend your elbows and bring your face to the wall. Keep your back and hips straight. 4. Push back to where you started. 5. Repeat 8 to 12 times. 6. When you can do this exercise against a wall comfortably, you can try it against a counter. You can then slowly progress to the end of a couch, then to a sturdy chair, and finally to the floor. Scapular exercise: Retraction   For this exercise, you will need elastic exercise material, such as surgical tubing or Thera-Band. 1. Put the band around a solid object at about waist level. (A bedpost will work well.) Each hand should hold an end of the band. 2. With your elbows at your sides and bent to 90 degrees, pull the band back. Your shoulder blades should move toward each other. Then move your arms back where you started. 3. Repeat 8 to 12 times. 4. If you have good range of motion in your shoulders, try this exercise with your arms lifted out to the sides. Keep your elbows at a 90-degree angle. Raise the elastic band up to about shoulder level. Pull the band back to move your shoulder blades toward each other. Then move your arms back where you started. Internal rotator strengthening exercise   1. Start by tying a piece of elastic exercise material to a doorknob. You can use surgical tubing or Thera-Band. 2. Stand or sit with your shoulder relaxed and your elbow bent 90 degrees. Your upper arm should rest comfortably against your side. Squeeze a rolled towel between your elbow and your body for comfort. This will help keep your arm at your side. 3. Hold one end of the elastic band in the hand of the painful arm. 4. Slowly rotate your forearm toward your body until it touches your belly. Slowly move it back to where you started.   5. Keep your elbow and upper arm firmly tucked against the towel roll or at your side. 6. Repeat 8 to 12 times. External rotator strengthening exercise   1. Start by tying a piece of elastic exercise material to a doorknob. You can use surgical tubing or Thera-Band. (You may also hold one end of the band in each hand.)  2. Stand or sit with your shoulder relaxed and your elbow bent 90 degrees. Your upper arm should rest comfortably against your side. Squeeze a rolled towel between your elbow and your body for comfort. This will help keep your arm at your side. 3. Hold one end of the elastic band with the hand of the painful arm. 4. Start with your forearm across your belly. Slowly rotate the forearm out away from your body. Keep your elbow and upper arm tucked against the towel roll or the side of your body until you begin to feel tightness in your shoulder. Slowly move your arm back to where you started. 5. Repeat 8 to 12 times. Follow-up care is a key part of your treatment and safety. Be sure to make and go to all appointments, and call your doctor if you are having problems. It's also a good idea to know your test results and keep a list of the medicines you take. Where can you learn more? Go to http://www.gray.com/  Enter J005 in the search box to learn more about \"Rotator Cuff: Exercises. \"  Current as of: March 2, 2020               Content Version: 12.6  © 9783[address]32 [employer]. Care instructions adapted under license by [employer] (which disclaims liability or warranty for this information). If you have questions about a medical condition or this instruction, always ask your healthcare professional. [place] [address] any warranty or liability for your use of this information.

## 2021-03-02 RX ORDER — TOPIRAMATE 200 MG/1
TABLET ORAL
Qty: 225 TAB | Refills: 0 | Status: SHIPPED | OUTPATIENT
Start: 2021-03-02 | End: 2021-05-19

## 2021-04-08 ENCOUNTER — TELEPHONE (OUTPATIENT)
Dept: NEUROLOGY | Age: 51
End: 2021-04-08

## 2021-04-08 DIAGNOSIS — G43.009 MIGRAINE WITHOUT AURA AND WITHOUT STATUS MIGRAINOSUS, NOT INTRACTABLE: ICD-10-CM

## 2021-04-08 RX ORDER — ELETRIPTAN HYDROBROMIDE 40 MG/1
TABLET, FILM COATED ORAL
Qty: 36 TAB | Refills: 3 | Status: SHIPPED | OUTPATIENT
Start: 2021-04-08 | End: 2021-06-09 | Stop reason: SDUPTHER

## 2021-04-08 NOTE — TELEPHONE ENCOUNTER
----- Message from Boca cristin sent at 4/8/2021  3:28 PM EDT -----  Regarding: /Telephone     Caller's first and last name:Pt  Reason for call:medication questions  Callback required yes/no and why:Yes  Best contact number(s):[phone]  Details to clarify the request:Pt wants to know why \"eletriptan\" quantity has changed significantly. Pt states she used to get a quantity of 36 and now is for 9.

## 2021-04-30 ENCOUNTER — TELEPHONE (OUTPATIENT)
Dept: INTERNAL MEDICINE CLINIC | Age: 51
End: 2021-04-30

## 2021-04-30 NOTE — TELEPHONE ENCOUNTER
Pt called stating she has swelling in her hands and feet x 1 week. She denies chest pain, sob, numbness/tingling, dizziness and/or headache. She hasn't checked her BP. She also still has left shoulder pain, over the last 2 months she has been unable to move her shoulder due to the pain and isn't sure if they are all related. She has see Dr. Kaylan valdes at [address] and has an appt next week. I advise that she call [facility] for evaluation of BP and they may check labs. I apologized that we didn't have any openings today, we have a few providers that are off today. F/up appt schedule with PCP for may 10th. [facility] contact # was provided. Pt was thankful.

## 2021-05-10 ENCOUNTER — OFFICE VISIT (OUTPATIENT)
Dept: INTERNAL MEDICINE CLINIC | Age: 51
End: 2021-05-10
Payer: COMMERCIAL

## 2021-05-10 VITALS
OXYGEN SATURATION: 99 % | DIASTOLIC BLOOD PRESSURE: 82 MMHG | HEIGHT: 70 IN | SYSTOLIC BLOOD PRESSURE: 125 MMHG | TEMPERATURE: 98.2 F | HEART RATE: 74 BPM | RESPIRATION RATE: 14 BRPM | BODY MASS INDEX: 38.48 KG/M2 | WEIGHT: 268.8 LBS

## 2021-05-10 DIAGNOSIS — M75.02 ADHESIVE CAPSULITIS OF LEFT SHOULDER: Primary | ICD-10-CM

## 2021-05-10 DIAGNOSIS — R60.0 EDEMA OF ALL FOUR EXTREMITIES: ICD-10-CM

## 2021-05-10 PROCEDURE — 99213 OFFICE O/P EST LOW 20 MIN: CPT | Performed by: INTERNAL MEDICINE

## 2021-05-10 RX ORDER — FUROSEMIDE 20 MG/1
20 TABLET ORAL
Qty: 30 TAB | Refills: 0 | Status: SHIPPED | OUTPATIENT
Start: 2021-05-10 | End: 2021-06-18

## 2021-05-10 RX ORDER — IBUPROFEN 200 MG
600 TABLET ORAL
Qty: 60 TAB | Refills: 0
Start: 2021-05-10 | End: 2022-07-11 | Stop reason: ALTCHOICE

## 2021-05-11 NOTE — PROGRESS NOTES
HISTORY OF PRESENT ILLNESS    Chief Complaint   Patient presents with    Hand Swelling     Both    Foot Swelling     Both - really bad for the past week.  Arm Pain     Left - cant lift too high without pain - see Ortho now. Presents for follow-up    Reports recent bilateral ankle and foot swelling and bilateral hand swelling. Onset was about 3 weeks ago. It is mild to moderate and actually has improved over the past couple of days. She is eating salty foods but denies any significant excessive salt intake that she is aware of. She has been taking ibuprofen 600 mg at night for adhesive capsulitis. She went to urgent care on May 10 due to concerns about having heart failure. Chest x-ray, EKG normal.  Labs show hemoglobin 12.3, troponin undetectable, D-dimer less than 100, sodium 138, potassium 4.1, glucose 96, BUN 11, creatinine 0.8, liver enzymes normal, albumin normal.    Seeing orthopedics for adhesive capsulitis of the left shoulder. She had an injection on May 6 with Dr. Robert Henderson and is seeing Ritika Chavez. She was referred to physical therapy and will start next week. Review of Systems   All other systems reviewed and are negative, except as noted in HPI    Past Medical and Surgical History   has a past medical history of Chest pain, Dyslipidemia, Facet hypertrophy of cervical region (), Iron deficiency anemia, Migraine, Obesity (BMI 35.0-39.9 without comorbidity) (2018), Pap smear for cervical cancer screening, RAD (reactive airway disease) (2020), and Spondylolisthesis, lumbar region (2018). has a past surgical history that includes hx  section; hx tubal ligation; hx lumbar diskectomy (); and hx lumbar fusion (N/A, 2018). reports that she has never smoked. She has never used smokeless tobacco. She reports current alcohol use of about 1.0 standard drinks of alcohol per week. She reports that she does not use drugs.   family history includes Cancer in her paternal grandmother; Depression in her father and mother; Diabetes in her maternal grandfather; Hypertension in her maternal grandfather; No Known Problems in her sister and sister; Other in her sister; Suicide in her father. Physical Exam   Nursing note and vitals reviewed. Blood pressure 125/82, pulse 74, temperature 98.2 °F (36.8 °C), temperature source Oral, resp. rate 14, height 5' 10\" (1.778 m), weight 268 lb 12.8 oz (121.9 kg), last menstrual period 04/26/2021, SpO2 99 %. Constitutional:  No distress. Eyes: Conjunctivae are normal.   Ears:  Hearing grossly intact  Cardiovascular: Normal rate. regular rhythm, no murmurs or gallops  No edema  Pulmonary/Chest: Effort normal.   CTAB  Musculoskeletal: moves all 4 extremities   Neurological: Alert and oriented to person, place, and time. Skin: No visible rash noted. Psychiatric: Normal mood and affect. Behavior is normal.     ASSESSMENT and PLAN  Diagnoses and all orders for this visit:    1. Adhesive capsulitis of left shoulder  Stretching exercises demonstrated for for this condition  She will see physical therapy. Continue ibuprofen  -     ibuprofen (MOTRIN) 200 mg tablet; Take 3 Tabs by mouth every eight (8) hours as needed for Pain. Take with food    2. Edema of all four extremities  There is no significant edema right now. Suspect this is some mild volume retention from NSAID use, sodium. Can use furosemide as needed. Thyroid function was previously normal, but could consider checking this. Reassured. Unlikely cardiac.  -     furosemide (LASIX) 20 mg tablet; Take 1 Tab by mouth daily as needed (swelling).     lab results and schedule of future lab studies reviewed with patient  reviewed medications and side effects in detail    Return to clinic for further evaluation if new symptoms develop        Current Outpatient Medications   Medication Sig    ibuprofen (MOTRIN) 200 mg tablet Take 3 Tabs by mouth every eight (8) hours as needed for Pain. Take with food    furosemide (LASIX) 20 mg tablet Take 1 Tab by mouth daily as needed (swelling).  eletriptan (Relpax) 40 mg tablet TAKE 1 TABLET BY MOUTH ONCE AS NEEDED. MAY REPEAT IN 2 HOURS IF NEEDED  Indications: a migraine headache    topiramate (TOPAMAX) 200 mg tablet Take one tablet by mouth every morning,and one and one half tablets by mouth every evening  Indications: migraine prevention    hydrocortisone valerate (WESTCORT) 0.2 % topical cream Apply  to affected area two (2) times a day. use thin layer. For seborrhea eyelids     No current facility-administered medications for this visit.

## 2021-05-19 RX ORDER — TOPIRAMATE 200 MG/1
TABLET ORAL
Qty: 225 TABLET | Refills: 0 | Status: SHIPPED | OUTPATIENT
Start: 2021-05-19 | End: 2021-05-27 | Stop reason: SDUPTHER

## 2021-05-27 RX ORDER — TOPIRAMATE 200 MG/1
TABLET ORAL
Qty: 225 TABLET | Refills: 0 | Status: SHIPPED | OUTPATIENT
Start: 2021-05-27 | End: 2021-06-02 | Stop reason: SDUPTHER

## 2021-06-02 RX ORDER — TOPIRAMATE 200 MG/1
TABLET ORAL
Qty: 225 TABLET | Refills: 1 | Status: SHIPPED | OUTPATIENT
Start: 2021-06-02 | End: 2022-03-02 | Stop reason: SDUPTHER

## 2021-06-07 ENCOUNTER — TELEPHONE (OUTPATIENT)
Dept: NEUROLOGY | Age: 51
End: 2021-06-07

## 2021-06-07 NOTE — TELEPHONE ENCOUNTER
Patient rescheduled her appt to 6/23 and needs a refill on Topirimate and Relpax. She is completely out of both.

## 2021-06-09 DIAGNOSIS — G43.009 MIGRAINE WITHOUT AURA AND WITHOUT STATUS MIGRAINOSUS, NOT INTRACTABLE: ICD-10-CM

## 2021-06-09 RX ORDER — ELETRIPTAN HYDROBROMIDE 40 MG/1
TABLET, FILM COATED ORAL
Qty: 36 TABLET | Refills: 1 | Status: SHIPPED | OUTPATIENT
Start: 2021-06-09 | End: 2021-06-23 | Stop reason: SDUPTHER

## 2021-06-18 DIAGNOSIS — R60.0 EDEMA OF ALL FOUR EXTREMITIES: ICD-10-CM

## 2021-06-18 RX ORDER — FUROSEMIDE 20 MG/1
TABLET ORAL
Qty: 30 TABLET | Refills: 0 | Status: SHIPPED | OUTPATIENT
Start: 2021-06-18 | End: 2021-07-22

## 2021-06-23 ENCOUNTER — OFFICE VISIT (OUTPATIENT)
Dept: NEUROLOGY | Age: 51
End: 2021-06-23
Payer: COMMERCIAL

## 2021-06-23 VITALS
DIASTOLIC BLOOD PRESSURE: 80 MMHG | SYSTOLIC BLOOD PRESSURE: 122 MMHG | HEIGHT: 70 IN | RESPIRATION RATE: 16 BRPM | BODY MASS INDEX: 38.8 KG/M2 | HEART RATE: 85 BPM | WEIGHT: 271 LBS | OXYGEN SATURATION: 98 %

## 2021-06-23 DIAGNOSIS — G43.109 MIGRAINE WITH AURA AND WITHOUT STATUS MIGRAINOSUS, NOT INTRACTABLE: Primary | ICD-10-CM

## 2021-06-23 DIAGNOSIS — G43.009 MIGRAINE WITHOUT AURA AND WITHOUT STATUS MIGRAINOSUS, NOT INTRACTABLE: ICD-10-CM

## 2021-06-23 PROCEDURE — 99214 OFFICE O/P EST MOD 30 MIN: CPT | Performed by: SPECIALIST

## 2021-06-23 RX ORDER — ELETRIPTAN HYDROBROMIDE 40 MG/1
TABLET, FILM COATED ORAL
Qty: 36 TABLET | Refills: 1 | Status: SHIPPED | OUTPATIENT
Start: 2021-06-23 | End: 2022-04-13 | Stop reason: SDUPTHER

## 2021-06-23 NOTE — PROGRESS NOTES
Neurology Consult      Subjective:      Rowdy Morse is a 49 y.o. female who comes in today as a long-term migraine headache follow-up patient. Had issues with the designation of topiramate as an allergic issue. Today we probably took it off the allergic agenda and its the branded Topamax she has difficulties with. She is on 200 mg of the topiramate and that did not need a refill today. Also referenced some difficulties getting the Relpax filled. Apparently the best designated form for that drug is #36 for 90 days. That was printed out by request so she can negotiate with perhaps a local [facility] instead of [employer]. She says it is too much red tape and negotiating her medicine with the [employer] individuals. Also briefly talked about her left shoulder concerns. She has mobility and range of motion issues which orthopedics is called for frozen shoulder. 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 I think Ortho is probably on spot with the frozen shoulder designation and she needs to follow-up there. Will suggest a revisit in 6 months. Current Outpatient Medications   Medication Sig Dispense Refill    eletriptan (Relpax) 40 mg tablet TAKE 1 TABLET BY MOUTH ONCE AS NEEDED. MAY REPEAT IN 2 HOURS IF NEEDED  Indications: a migraine headache 36 Tablet 1    furosemide (LASIX) 20 mg tablet TAKE 1 TABLET BY MOUTH EVERY DAY AS NEEDED FOR SWELLING 30 Tablet 0    topiramate (TOPAMAX) 200 mg tablet TAKE 1 TABLET EVERY MORNINGAND 1 AND 1/2 TABLETS EVERYEVENING FOR MIGRAINE       PREVENTION. 225 Tablet 1    ibuprofen (MOTRIN) 200 mg tablet Take 3 Tabs by mouth every eight (8) hours as needed for Pain.  Take with food 60 Tab 0    hydrocortisone valerate (WESTCORT) 0.2 % topical cream Apply  to affected area two (2) times a day. use thin layer. For seborrhea eyelids 15 g 3      No Known Allergies  Past Medical History:   Diagnosis Date    Chest pain     atypical saw cardiology Dr. Irene Guevara Dyslipidemia     mild    Facet hypertrophy of cervical region     L C4-C5    Iron deficiency anemia     chronic. takes iron     Migraine     Dr. Pamela Brunson Obesity (BMI 35.0-39.9 without comorbidity) 2018    Pap smear for cervical cancer screening     Dr. Laci East.  RAD (reactive airway disease) 2020    Dr. Quincy Rachel, lumbar region 2018    saw Dr. Bin Deras, Dr. Guillermo Caldera, Dr. Paula Stone. hx spinal surgery ,       Past Surgical History:   Procedure Laterality Date    HX  SECTION      HX LUMBAR DISKECTOMY      L 5 S1    HX LUMBAR FUSION N/A 2018    L4-S1 ALIF. Dr. Narayan Iqbal History     Socioeconomic History    Marital status:      Spouse name: Ray Mart Number of children: 2    Years of education: Not on file    Highest education level: Not on file   Occupational History     Employer: [employer]   Tobacco Use    Smoking status: Never Smoker    Smokeless tobacco: Never Used   Substance and Sexual Activity    Alcohol use: Yes     Alcohol/week: 1.0 standard drinks     Types: 1 Glasses of wine per week     Comment: x1 a month    Drug use: No    Sexual activity: Yes     Partners: Male     Birth control/protection: Surgical   Other Topics Concern    Not on file   Social History Narrative    Not on file     Social Determinants of Health     Financial Resource Strain:     Difficulty of Paying Living Expenses:    Food Insecurity:     Worried About Running Out of Food in the Last Year:     [address] in the Last Year:    Transportation Needs:     Lack of Transportation (Medical):      Lack of Transportation (Non-Medical):    Physical Activity:     Days of Exercise per Week:     Minutes of Exercise per Session:    Stress:     Feeling of Stress :    Social Connections:     Frequency of Communication with Friends and Family:     Frequency of Social Gatherings with Friends and Family:     Attends Mu-ism Services:     Active Member of Clubs or Organizations:     Attends Club or Organization Meetings:     Marital Status:    Intimate Partner Violence:     Fear of Current or Ex-Partner:     Emotionally Abused:     Physically Abused:     Sexually Abused:       Family History   Problem Relation Age of Onset    Suicide Father     Depression Father     Depression Mother     Other Sister         Kidney Issues    No Known Problems Sister     No Known Problems Sister     Hypertension Maternal Grandfather     Diabetes Maternal Grandfather     Cancer Paternal Grandmother         lung      Visit Vitals  /80   Pulse 85   Resp 16   Ht 5' 10\" (1.778 m)   Wt 122.9 kg (271 lb)   SpO2 98%   BMI 38.88 kg/m²        Review of Systems:   A comprehensive review of systems was negative except for that written in the HPI. Neuro Exam:     Appearance: The patient is well developed, well nourished, provides a coherent history and is in no acute distress. Mental Status: Oriented to time, place and person. Mood and affect appropriate. Cranial Nerves:   Intact visual fields. Fundi are benign. MONSTER, EOM's full, no nystagmus, no ptosis. Facial sensation is normal. Corneal reflexes are intact. Facial movement is symmetric. Hearing is normal bilaterally. Palate is midline with normal sternocleidomastoid and trapezius muscles are normal. Tongue is midline. Motor:  5/5 strength in upper and lower proximal and distal muscles. Normal bulk and tone. No fasciculations. Reflexes:   Deep tendon reflexes 2+/4 and symmetrical.   Sensory:   Normal to touch, pinprick and vibration. Gait:  Normal gait. Tremor:   No tremor noted. Cerebellar:  No cerebellar signs present.    Neurovascular:  Normal heart sounds and regular rhythm, peripheral pulses intact, and no carotid bruits. Assessment:   Migraine headaches. Took time to go over the medication insurance issues as they are with the topiramate as well as the Relpax. Please see above dictation. Renewed Relpax by agreement and issued as a printed prescription. Good good sleep minimize stress and exercise could help. Revisit 6 months. The left shoulder mobility issue I think defaults to frozen shoulder is orthopedics has designated. I do not see a link between migraine and that problem other than apparently she has some occasional somatosensory aura that go down the left side of her head neck and arm regions on occasion. But I do not affiliate the two as in a version of hemiplegic migraine or related concerns. Plan:   Revisit 6 months.   Signed by :  Iván Vitale MD

## 2021-06-23 NOTE — PATIENT INSTRUCTIONS
Patient history reviewed patient examined. Will try to sort out the Relpax issue with a written prescription for #36 for 90 days. We will keep on the topiramate and that has been removed as an allergic drug in the electronic medical records. Revisit 6 months.

## 2021-06-23 NOTE — LETTER
6/23/2021    Patient: June Laurent   YOB: 1970   Date of Visit: 6/23/2021     Nuzhat Butcher, [address]  [place] [employer]    Dear Nuzhat Butcher MD,      Thank you for referring Ms. Missy Rosenthal to [facility] for evaluation. My notes for this consultation are attached. If you have questions, please do not hesitate to call me. I look forward to following your patient along with you.       Sincerely,    Mona Irby MD

## 2021-06-23 NOTE — PROGRESS NOTES
Identified pt with two pt identifiers(name and ). Reviewed record in preparation for visit and have obtained necessary documentation. Chief Complaint   Patient presents with    Migraine     f/u; issue with rx for topiramate, insurance wont't fill because this is on her allergy list    Arm Problem     pt cannot lift left arm above shoulder, she feels its due to migraines, ortho thinks it's frozen shoulder      Vitals:    21 1036   BP: 122/80   Pulse: 85   Resp: 16   SpO2: 98%   Weight: 122.9 kg (271 lb)   Height: 5' 10\" (1.778 m)   PainSc:   0 - No pain       Health Maintenance Review: Patient reminded of \"due or due soon\" health maintenance. I have asked the patient to contact his/her primary care provider (PCP) for follow-up on his/her health maintenance. Coordination of Care Questionnaire:  :   1) Have you been to an emergency room, urgent care, or hospitalized since your last visit? If yes, where when, and reason for visit? No      2. Have seen or consulted any other health care provider since your last visit? If yes, where when, and reason for visit? YES- Physical Therapy, ortho visits      Patient is accompanied by self I have received verbal consent from Pepe Hurley to discuss any/all medical information while they are present in the room.

## 2021-07-22 DIAGNOSIS — R60.0 EDEMA OF ALL FOUR EXTREMITIES: ICD-10-CM

## 2021-07-22 RX ORDER — FUROSEMIDE 20 MG/1
TABLET ORAL
Qty: 30 TABLET | Refills: 0 | Status: SHIPPED | OUTPATIENT
Start: 2021-07-22 | End: 2021-08-20

## 2021-12-30 ENCOUNTER — VIRTUAL VISIT (OUTPATIENT)
Dept: INTERNAL MEDICINE CLINIC | Age: 51
End: 2021-12-30
Payer: COMMERCIAL

## 2021-12-30 DIAGNOSIS — J40 BRONCHITIS: Primary | ICD-10-CM

## 2021-12-30 PROCEDURE — 99213 OFFICE O/P EST LOW 20 MIN: CPT | Performed by: INTERNAL MEDICINE

## 2021-12-30 RX ORDER — BENZONATATE 100 MG/1
100 CAPSULE ORAL
Qty: 30 CAPSULE | Refills: 0 | Status: SHIPPED | OUTPATIENT
Start: 2021-12-30 | End: 2022-01-13

## 2021-12-30 RX ORDER — CODEINE PHOSPHATE AND GUAIFENESIN 10; 100 MG/5ML; MG/5ML
5 SOLUTION ORAL
Qty: 60 ML | Refills: 0 | Status: SHIPPED | OUTPATIENT
Start: 2021-12-30 | End: 2022-01-06 | Stop reason: SDUPTHER

## 2021-12-30 NOTE — PROGRESS NOTES
Consent: Tai Pineda, who was seen by synchronous (real-time) audio-video technology, and/or her healthcare decision maker, is aware that this patient-initiated, Telehealth encounter on 12/30/2021 is a billable service, with coverage as determined by her insurance carrier. She is aware that she may receive a bill and has provided verbal consent to proceed: Yes. I was in the office while conducting this encounter. Patient identification was verified at the start of the visit: YES  This visit was done with doxy. me  The patient is located in [place] during this visit    Note   Chief Complaint   cough    Tai Pineda is a 49 y.o. female     1. Bronchitis  -     benzonatate (Tessalon Perles) 100 mg capsule; Take 1 Capsule by mouth three (3) times daily as needed for Cough for up to 14 days. , Normal, Disp-30 Capsule, R-0  -     guaiFENesin-codeine (ROBITUSSIN AC) 100-10 mg/5 mL solution; Take 5 mL by mouth three (3) times daily as needed for Cough for up to 7 days. Max Daily Amount: 15 mL., Normal, Disp-60 mL, R-0   2 days   Nonproductive cough, sinus congestion, fatigue, low grade fever, sore throat, mild HA  No SOB, abd pain, n/v/d/c  No sick contacts   No muscle aches, loss taste/smell  Can't find a covid test   Cough medication sent to pharmacy. Most likely viral infection at this time but advised to call if no improvement early next week. Advised to get a Covid test if able but assume she potentially has Covid in terms of quarantine. We discussed the expected course, resolution and complications of the diagnosis(es) in detail. Medication risks, benefits, costs, interactions, and alternatives were discussed as indicated. I advised her to contact the office if her condition worsens, changes or fails to improve as anticipated. She expressed understanding with the diagnosis(es) and plan.      Return to clinic: As needed    Laurent Brito MD  [facility]  12/30/2021    No future appointments. Objective   Vitals:       No flowsheet data found. Physical Exam  Constitutional:       Comments: Appears tired   Pulmonary:      Effort: No respiratory distress. Neurological:      Mental Status: She is alert. Current Outpatient Medications   Medication Sig    benzonatate (Tessalon Perles) 100 mg capsule Take 1 Capsule by mouth three (3) times daily as needed for Cough for up to 14 days.  guaiFENesin-codeine (ROBITUSSIN AC) 100-10 mg/5 mL solution Take 5 mL by mouth three (3) times daily as needed for Cough for up to 7 days. Max Daily Amount: 15 mL.  eletriptan (Relpax) 40 mg tablet TAKE 1 TABLET BY MOUTH ONCE AS NEEDED. MAY REPEAT IN 2 HOURS IF NEEDED  Indications: a migraine headache    topiramate (TOPAMAX) 200 mg tablet TAKE 1 TABLET EVERY MORNINGAND 1 AND 1/2 TABLETS EVERYEVENING FOR MIGRAINE       PREVENTION.  ibuprofen (MOTRIN) 200 mg tablet Take 3 Tabs by mouth every eight (8) hours as needed for Pain. Take with food    hydrocortisone valerate (WESTCORT) 0.2 % topical cream Apply  to affected area two (2) times a day. use thin layer. For seborrhea eyelids    furosemide (LASIX) 20 mg tablet TAKE 1 TABLET BY MOUTH EVERY DAY AS NEEDED FOR SWELLING (Patient not taking: Reported on 12/30/2021)     No current facility-administered medications for this visit. Ese Recinos is a 49 y.o. female being evaluated by a video visit encounter for concerns as above. A caregiver was present when appropriate. Due to this being a TeleHealth encounter (During YARPK-01 public health emergency), evaluation of the following organ systems was limited: Vitals/Constitutional/EENT/Resp/CV/GI//MS/Neuro/Skin/Heme-Lymph-Imm.   Pursuant to the emergency declaration under the [facility], [address] [place] [address] and the [employer] and [employer] Act, this Virtual  Visit was conducted, with patient's (and/or legal guardian's) consent, to reduce the patient's risk of exposure to COVID-19 and provide necessary medical care. Services were provided through a video synchronous discussion virtually to substitute for in-person clinic visit.

## 2022-01-06 ENCOUNTER — VIRTUAL VISIT (OUTPATIENT)
Dept: INTERNAL MEDICINE CLINIC | Age: 52
End: 2022-01-06
Payer: COMMERCIAL

## 2022-01-06 DIAGNOSIS — J40 BRONCHITIS: ICD-10-CM

## 2022-01-06 DIAGNOSIS — J01.00 SUBACUTE MAXILLARY SINUSITIS: Primary | ICD-10-CM

## 2022-01-06 PROCEDURE — 99214 OFFICE O/P EST MOD 30 MIN: CPT | Performed by: INTERNAL MEDICINE

## 2022-01-06 RX ORDER — CODEINE PHOSPHATE AND GUAIFENESIN 10; 100 MG/5ML; MG/5ML
5 SOLUTION ORAL
Qty: 120 ML | Refills: 0 | Status: SHIPPED | OUTPATIENT
Start: 2022-01-06 | End: 2022-01-13

## 2022-01-06 RX ORDER — DOXYCYCLINE 100 MG/1
100 TABLET ORAL 2 TIMES DAILY
Qty: 20 TABLET | Refills: 0 | Status: SHIPPED | OUTPATIENT
Start: 2022-01-06 | End: 2022-03-24 | Stop reason: ALTCHOICE

## 2022-01-06 RX ORDER — GUAIFENESIN 600 MG/1
600 TABLET, EXTENDED RELEASE ORAL 2 TIMES DAILY
Qty: 30 TABLET | Refills: 0 | Status: SHIPPED | OUTPATIENT
Start: 2022-01-06 | End: 2022-04-13

## 2022-01-06 RX ORDER — BUDESONIDE AND FORMOTEROL FUMARATE DIHYDRATE 160; 4.5 UG/1; UG/1
2 AEROSOL RESPIRATORY (INHALATION) 2 TIMES DAILY
Qty: 10.2 G | Refills: 1 | Status: SHIPPED | OUTPATIENT
Start: 2022-01-06 | End: 2022-03-24 | Stop reason: ALTCHOICE

## 2022-01-06 NOTE — PROGRESS NOTES
Diagnoses and all orders for this visit:    1. Bronchitis   not resolving over 2 weeks, not improving from 12/30/2021 visit  Vaccinated and tested negative for Covid   possibly developing reactive airway      -     guaiFENesin-codeine (ROBITUSSIN AC) 100-10 mg/5 mL solution; Take 5 mL by mouth three (3) times daily as needed for Cough for up to 7 days. Max Daily Amount: 15 mL. -     budesonide-formoteroL (SYMBICORT) 160-4.5 mcg/actuation HFAA; Take 2 Puffs by inhalation two (2) times a day. -     guaiFENesin ER (MUCINEX) 600 mg ER tablet; Take 1 Tablet by mouth two (2) times a day. Sinusitis  Will try Robitussin and Symbicort but if no improvement and continued green nasal drainage will start Doxy as not improved from December 30  -     doxycycline (ADOXA) 100 mg tablet; Take 1 Tablet by mouth two (2) times a day. Chief Complaint   Patient presents with    Cough     patient says the symptoms have been going on for over a week     Bronchitis   patient reports she came sick over [place]. She reports her symptoms are progressively not improving. Her symptoms are interfering with her sleep. She was able to use some guaifenesin with codeine with good benefit but ran out. She was able to sleep with the medication and was improving but then ran out of medication and got worse again. She has coughing fits at night. She does not have an inhaler. She has started to sleep upright. She is continued congestion. Rapid x2 - for Covid. She is vaccinated but did not have her flu shot. She denies wheezing. She has some mild shortness of breath after taking a few deep breaths. She tried the [employer] were not helpful she is developed a headache because of not sleeping sinus pain    Sinusitis  She notes she is at risk for sinus infections and prone to these. She is now developing a headache. She has some mild tenderness over her eyes. She has more of a yellowish sputum.     Past Medical History:   Diagnosis Date    Chest pain     atypical saw cardiology Dr. George Swartz Dyslipidemia     mild    Facet hypertrophy of cervical region 2015    L C4-C5    Iron deficiency anemia     chronic. takes iron     Migraine     Dr. Nusrat Vleez Obesity (BMI 35.0-39.9 without comorbidity) 2018    Pap smear for cervical cancer screening     Dr. Lin Orantes  RAD (reactive airway disease) 2020    Dr. Tish Hernandez, lumbar region 2018    saw Dr. Juliana Wilson, Dr. Hardy April, Dr. Feliberto Lam. hx spinal surgery ,      Past Surgical History:   Procedure Laterality Date    HX  SECTION      HX LUMBAR DISKECTOMY      L 5 S1    HX LUMBAR FUSION N/A 2018    L4-S1 ALIF. Dr. Michelle Tripp History     Socioeconomic History    Marital status:      Spouse name: Leonid Lynn Number of children: 2   Occupational History     Employer: [employer]   Tobacco Use    Smoking status: Never Smoker    Smokeless tobacco: Never Used   Substance and Sexual Activity    Alcohol use: Yes     Alcohol/week: 1.0 standard drink     Types: 1 Glasses of wine per week     Comment: x1 a month    Drug use: No    Sexual activity: Yes     Partners: Male     Birth control/protection: Surgical     Family History   Problem Relation Age of Onset    Suicide Father    [facility] Depression Father     Depression Mother     Other Sister         Kidney Issues    No Known Problems Sister     No Known Problems Sister     Hypertension Maternal Grandfather     Diabetes Maternal Grandfather     Cancer Paternal Grandmother         lung     Current Outpatient Medications   Medication Sig Dispense Refill    eletriptan (Relpax) 40 mg tablet TAKE 1 TABLET BY MOUTH ONCE AS NEEDED. MAY REPEAT IN 2 HOURS IF NEEDED  Indications: a migraine headache 36 Tablet 1    topiramate (TOPAMAX) 200 mg tablet TAKE 1 TABLET EVERY MORNINGAND 1 AND 1/2 TABLETS EVERYEVENING FOR MIGRAINE       PREVENTION.  225 Tablet 1  hydrocortisone valerate (WESTCORT) 0.2 % topical cream Apply  to affected area two (2) times a day. use thin layer. For seborrhea eyelids 15 g 3    benzonatate (Tessalon Perles) 100 mg capsule Take 1 Capsule by mouth three (3) times daily as needed for Cough for up to 14 days. (Patient not taking: Reported on 1/6/2022) 30 Capsule 0    guaiFENesin-codeine (ROBITUSSIN AC) 100-10 mg/5 mL solution Take 5 mL by mouth three (3) times daily as needed for Cough for up to 7 days. Max Daily Amount: 15 mL. (Patient not taking: Reported on 1/6/2022) 60 mL 0    furosemide (LASIX) 20 mg tablet TAKE 1 TABLET BY MOUTH EVERY DAY AS NEEDED FOR SWELLING (Patient not taking: Reported on 12/30/2021) 30 Tablet 5    ibuprofen (MOTRIN) 200 mg tablet Take 3 Tabs by mouth every eight (8) hours as needed for Pain. Take with food 60 Tab 0     No Known Allergies    Review of Systems - General ROS: negative for - chills or fever  Cardiovascular ROS: no chest pain or dyspnea on exertion  Respiratory ROS: no cough, shortness of breath, or wheezing    There were no vitals taken for this visit.   Constitutional: [x] Appears well-developed and well-nourished [x] No apparent distress      [] Abnormal -     Mental status: [x] Alert and awake  [x] Oriented to person/place/time [x] Able to follow commands    [] Abnormal -     Eyes:   EOM    [x]  Normal    [] Abnormal -   Sclera  [x]  Normal    [] Abnormal -          Discharge [x]  None visible   [] Abnormal -     HENT: [x] Normocephalic, atraumatic  [] Abnormal -   [x] Mouth/Throat: Mucous membranes are moist    External Ears [x] Normal  [] Abnormal -    Neck: [x] No visualized mass [] Abnormal -     Pulmonary/Chest: [x] Respiratory effort normal   [x] No visualized signs of difficulty breathing or respiratory distress        [] Abnormal -      Musculoskeletal:   [x] Normal gait with no signs of ataxia         [x] Normal range of motion of neck        [] Abnormal -     Neurological:        [x] No Facial Asymmetry (Cranial nerve 7 motor function) (limited exam due to video visit)          [x] No gaze palsy        [] Abnormal -          Skin:        [x] No significant exanthematous lesions or discoloration noted on facial skin         [] Abnormal -            Psychiatric:       [x] Normal Affect [] Abnormal -        [x] No Hallucinations      ATTENTION:   This medical record was transcribed using an electronic medical records/speech recognition system. Although proofread, it may and can contain electronic, spelling and other errors. Corrections may be executed at a later time. Please contact us for any clarifications as needed. Video this is a virtual visit. I was located in my office and the patient was located in his/her home. Pt has given consent and aware this visit will be billed to his/her health insurance.

## 2022-03-18 PROBLEM — M43.16 SPONDYLOLISTHESIS, LUMBAR REGION: Status: ACTIVE | Noted: 2018-04-23

## 2022-03-19 PROBLEM — J45.909 RAD (REACTIVE AIRWAY DISEASE): Status: ACTIVE | Noted: 2020-02-01

## 2022-03-19 PROBLEM — E66.9 OBESITY (BMI 35.0-39.9 WITHOUT COMORBIDITY): Status: ACTIVE | Noted: 2018-04-17

## 2022-03-24 ENCOUNTER — VIRTUAL VISIT (OUTPATIENT)
Dept: INTERNAL MEDICINE CLINIC | Age: 52
End: 2022-03-24
Payer: COMMERCIAL

## 2022-03-24 DIAGNOSIS — Z86.16 HISTORY OF COVID-19: ICD-10-CM

## 2022-03-24 DIAGNOSIS — J18.9 PNEUMONIA DUE TO INFECTIOUS ORGANISM, UNSPECIFIED LATERALITY, UNSPECIFIED PART OF LUNG: Primary | ICD-10-CM

## 2022-03-24 DIAGNOSIS — J45.909 REACTIVE AIRWAY DISEASE WITHOUT COMPLICATION, UNSPECIFIED ASTHMA SEVERITY, UNSPECIFIED WHETHER PERSISTENT: ICD-10-CM

## 2022-03-24 PROCEDURE — 99213 OFFICE O/P EST LOW 20 MIN: CPT | Performed by: INTERNAL MEDICINE

## 2022-03-24 RX ORDER — AZITHROMYCIN 250 MG/1
TABLET, FILM COATED ORAL
Qty: 6 TABLET | Refills: 0 | Status: SHIPPED | OUTPATIENT
Start: 2022-03-24 | End: 2022-03-29

## 2022-03-24 RX ORDER — BUDESONIDE AND FORMOTEROL FUMARATE DIHYDRATE 160; 4.5 UG/1; UG/1
2 AEROSOL RESPIRATORY (INHALATION) 2 TIMES DAILY
Qty: 10.2 G | Refills: 1 | Status: SHIPPED | OUTPATIENT
Start: 2022-03-24 | End: 2022-10-20 | Stop reason: ALTCHOICE

## 2022-03-24 NOTE — PROGRESS NOTES
Consent: Ronna Mccary, who was seen by synchronous (real-time) audio-video technology, and/or her healthcare decision maker, is aware that this patient-initiated, Telehealth encounter on 3/24/2022 is a billable service, with coverage as determined by her insurance carrier. She is aware that she may receive a bill and has provided verbal consent to proceed: Yes. 712  Subjective:   Ronna Mccray is a 49 y.o. female who was seen for Follow-up    Dx with COVID 2/4/22. Daughter and  w same. She has been vaccinated, but no booster. Hx sinusitis. Given tessalon 12/30/21. Took doxycycline and symbicort 1/6/22. Her  is also sick with same s/s since COVID. Reports COVID s/s were moderate and improved, then they were exposed to another illness in early March. Went to [facility] 3/14/22 and CXR reportedly showed PNA. Was given steroid only, no antibiotics.  was given doxycycline. She overall feels tired still. Denies SOB. Cough is moderate and sometimes keeps her awake. Working for [employer]. Current Outpatient Medications   Medication Sig    topiramate (TOPAMAX) 200 mg tablet TAKE 1 TABLET EVERY MORNINGAND 1 AND 1/2 TABLETS EVERYEVENING FOR MIGRAINE PREVENTION.  guaiFENesin ER (MUCINEX) 600 mg ER tablet Take 1 Tablet by mouth two (2) times a day.  eletriptan (Relpax) 40 mg tablet TAKE 1 TABLET BY MOUTH ONCE AS NEEDED. MAY REPEAT IN 2 HOURS IF NEEDED  Indications: a migraine headache    ibuprofen (MOTRIN) 200 mg tablet Take 3 Tabs by mouth every eight (8) hours as needed for Pain. Take with food    hydrocortisone valerate (WESTCORT) 0.2 % topical cream Apply  to affected area two (2) times a day. use thin layer. For seborrhea eyelids     No current facility-administered medications for this visit.        No Known Allergies    Past Medical History:   Diagnosis Date    Chest pain     atypical saw cardiology Dr. Alex Mancera Dyslipidemia     mild    Facet hypertrophy of cervical region 2015    L C4-C5    Iron deficiency anemia     chronic. takes iron     Migraine     Dr. Kristine Rich Obesity (BMI 35.0-39.9 without comorbidity) 04/17/2018    Pap smear for cervical cancer screening     Dr. Jefry Flannery.  RAD (reactive airway disease) 02/2020    Dr. Jose Aleman, lumbar region 04/23/2018    saw Dr. Rachel Germain, Dr. Linda Barrera, Dr. Justin Oneal. hx spinal surgery 2004, 2018       ROS  All other systems reviewed and negative, unless mentioned in HPI    Objective:   Vital Signs: (As obtained by patient/caregiver at home)  There were no vitals taken for this visit.      [INSTRUCTIONS:  \"[x]\" Indicates a positive item  \"[]\" Indicates a negative item  -- DELETE ALL ITEMS NOT EXAMINED]    Constitutional: [x] Appears well-developed and well-nourished [x] No apparent distress      [] Abnormal -     Mental status: [x] Alert and awake  [x] Oriented to person/place/time [x] Able to follow commands    [] Abnormal -     Eyes:   EOM    [x]  Normal    [] Abnormal -   Sclera  [x]  Normal    [] Abnormal -          Discharge [x]  None visible   [] Abnormal -     HENT: [x] Normocephalic, atraumatic  [] Abnormal -   [x] Mouth/Throat: Mucous membranes are moist    External Ears [x] Normal  [] Abnormal -    Neck: [x] No visualized mass [] Abnormal -     Pulmonary/Chest: [x] Respiratory effort normal   [x] No visualized signs of difficulty breathing or respiratory distress        [] Abnormal -      Musculoskeletal:   [x] Normal gait with no signs of ataxia         [x] Normal range of motion of neck        [] Abnormal -     Neurological:        [x] No Facial Asymmetry (Cranial nerve 7 motor function) (limited exam due to video visit)          [x] No gaze palsy        [] Abnormal -          Skin:        [x] No significant exanthematous lesions or discoloration noted on facial skin         [] Abnormal -            Psychiatric:       [x] Normal Affect [] Abnormal -        [x] No Hallucinations    Other pertinent observable physical exam findings:-        Assessment & Plan:   Diagnoses and all orders for this visit:    1. Pneumonia due to infectious organism, unspecified laterality, unspecified part of lung  2. History of COVID-19  3. Reactive airway disease without complication, unspecified asthma severity, unspecified whether persistent  COVID-19 diagnosed 7 weeks ago. Symptoms seem to improve but then she was diagnosed with what she states was a unilateral pneumonia at urgent care. I think this may be an atypical pneumonia, not COVID-19 pneumonia. Recommend azithromycin. She overall appears fairly well and I suspect she also may have some degree of reactive airway component. Resume her Symbicort. Consider reimaging only if symptoms persist after 2 more weeks or so.  -     azithromycin (ZITHROMAX) 250 mg tablet; Take 2 tab today, then 1 tab daily for 4 days  -     budesonide-formoteroL (SYMBICORT) 160-4.5 mcg/actuation HFAA; Take 2 Puffs by inhalation two (2) times a day. We discussed the expected course, resolution and complications of the diagnosis(es) in detail. Medication risks, benefits, costs, interactions, and alternatives were discussed as indicated. I advised her to contact the office if her condition worsens, changes or fails to improve as anticipated. She expressed understanding with the diagnosis(es) and plan. Nvaa Decker, was evaluated through a synchronous (real-time) audio-video encounter. The patient (or guardian if applicable) is aware that this is a billable service, which includes applicable co-pays. This Virtual Visit was conducted with patient's (and/or legal guardian's) consent. The visit was conducted pursuant to the emergency declaration under the [facility], [address] [place] [address] authority and the [employer] General Act. Patient identification was verified, and a caregiver was present when appropriate. The patient was located in a state where the provider was licensed to provide care. Services were provided through a synchronous discussion virtually to substitute for in-person clinic visit. I was in the office. The patient was at home.      Chase Cope MD

## 2022-04-13 ENCOUNTER — OFFICE VISIT (OUTPATIENT)
Dept: NEUROLOGY | Age: 52
End: 2022-04-13
Payer: COMMERCIAL

## 2022-04-13 VITALS
HEIGHT: 70 IN | OXYGEN SATURATION: 98 % | RESPIRATION RATE: 16 BRPM | HEART RATE: 74 BPM | DIASTOLIC BLOOD PRESSURE: 74 MMHG | BODY MASS INDEX: 38.65 KG/M2 | WEIGHT: 270 LBS | SYSTOLIC BLOOD PRESSURE: 124 MMHG

## 2022-04-13 DIAGNOSIS — E66.01 SEVERE OBESITY (BMI 35.0-39.9) WITH COMORBIDITY (HCC): ICD-10-CM

## 2022-04-13 DIAGNOSIS — G43.009 MIGRAINE WITHOUT AURA AND WITHOUT STATUS MIGRAINOSUS, NOT INTRACTABLE: Primary | ICD-10-CM

## 2022-04-13 PROCEDURE — 99213 OFFICE O/P EST LOW 20 MIN: CPT | Performed by: SPECIALIST

## 2022-04-13 RX ORDER — TOPIRAMATE 200 MG/1
TABLET ORAL
Qty: 225 TABLET | Refills: 1 | Status: SHIPPED | OUTPATIENT
Start: 2022-04-13 | End: 2022-08-05

## 2022-04-13 RX ORDER — ELETRIPTAN HYDROBROMIDE 40 MG/1
TABLET, FILM COATED ORAL
Qty: 36 TABLET | Refills: 1 | Status: SHIPPED | OUTPATIENT
Start: 2022-04-13 | End: 2022-07-08 | Stop reason: SDUPTHER

## 2022-04-13 NOTE — PROGRESS NOTES
Chief Complaint   Patient presents with    Migraine     follow up, doing well on medication, 1-2 migraines in the last month, patient states she had covid-19 and PNA in Feb 2022, resolved, acccompanied by her      Visit Vitals  /74   Pulse 74   Resp 16   Ht 5' 10\" (1.778 m)   Wt 122.5 kg (270 lb)   SpO2 98%   BMI 38.74 kg/m²

## 2022-04-13 NOTE — PROGRESS NOTES
Neurology Consult      Subjective:      Loco Timmons is a 49 y.o. female who comes in today with chronic history of migraines. Is on her favorite rescue and preventative drugs Relpax 40 mg and Topamax 200 mg taken 1 in the AM and 1-1/2 in the p.m. Unfortunately had the luck of getting the Covid virus several months ago and was under the weather for a while. Is up-to-date on her vaccinations and boosters and happens to work for [employer] and they are in the process of integrating an updated vaccine that will take care of potentially the omicron variant and the spike variant so to speak. She references no other new medical or surgical history and exam looks normal etc.  Medicine renewed by request.  Yogesh Harris 6 months. Current Outpatient Medications   Medication Sig Dispense Refill    eletriptan (Relpax) 40 mg tablet TAKE 1 TABLET BY MOUTH ONCE AS NEEDED. MAY REPEAT IN 2 HOURS IF NEEDED  Indications: a migraine headache 36 Tablet 1    topiramate (TOPAMAX) 200 mg tablet 1 in the AM and 1-1/2 in the p.m.225 225 Tablet 1    budesonide-formoteroL (SYMBICORT) 160-4.5 mcg/actuation HFAA Take 2 Puffs by inhalation two (2) times a day. 10.2 g 1    topiramate (TOPAMAX) 200 mg tablet TAKE 1 TABLET EVERY MORNINGAND 1 AND 1/2 TABLETS EVERYEVENING FOR MIGRAINE PREVENTION. 75 Tablet 0    ibuprofen (MOTRIN) 200 mg tablet Take 3 Tabs by mouth every eight (8) hours as needed for Pain. Take with food 60 Tab 0    hydrocortisone valerate (WESTCORT) 0.2 % topical cream Apply  to affected area two (2) times a day. use thin layer.   For seborrhea eyelids 15 g 3      No Known Allergies  Past Medical History:   Diagnosis Date    Chest pain     atypical saw cardiology Dr. Maximiliano Bhatti Dyslipidemia     mild    Facet hypertrophy of cervical region 2015    L C4-C5    Iron deficiency anemia     chronic. takes iron     Migraine     Dr. Yana Carolina Obesity (BMI 35.0-39.9 without comorbidity) 04/17/2018    Pap smear for cervical cancer screening     Dr. Khushi Cai.  RAD (reactive airway disease) 2020    Dr. Pradeep Manjarrez, lumbar region 2018    saw Dr. Rachel Reyes, Dr. Daya Ovalles, Dr. Elio Martinez. hx spinal surgery ,       Past Surgical History:   Procedure Laterality Date    HX  SECTION      HX LUMBAR DISKECTOMY      L 5 S1    HX LUMBAR FUSION N/A 2018    L4-S1 ALIF. Dr. Beasley Majestic History     Socioeconomic History    Marital status:      Spouse name: Zac Agustin Number of children: 2    Years of education: Not on file    Highest education level: Not on file   Occupational History     Employer: [employer]   Tobacco Use    Smoking status: Never Smoker    Smokeless tobacco: Never Used   Substance and Sexual Activity    Alcohol use: Yes     Alcohol/week: 1.0 standard drink     Types: 1 Glasses of wine per week     Comment: x1 a month    Drug use: No    Sexual activity: Yes     Partners: Male     Birth control/protection: Surgical   Other Topics Concern    Not on file   Social History Narrative    Not on file     Social Determinants of Health     Financial Resource Strain:     Difficulty of Paying Living Expenses: Not on file   Food Insecurity:     Worried About Running Out of Food in the Last Year: Not on file    Enrique of Food in the Last Year: Not on file   Transportation Needs:     Lack of Transportation (Medical): Not on file    Lack of Transportation (Non-Medical):  Not on file   Physical Activity:     Days of Exercise per Week: Not on file    Minutes of Exercise per Session: Not on file   Stress:     Feeling of Stress : Not on file   Social Connections:     Frequency of Communication with Friends and Family: Not on file    Frequency of Social Gatherings with Friends and Family: Not on file    Attends Yazidi Services: Not on file    Active Member of Clubs or Organizations: Not on file    Attends Club or Organization Meetings: Not on file   Lanette Dukes Marital Status: Not on file   Intimate Partner Violence:     Fear of Current or Ex-Partner: Not on file    Emotionally Abused: Not on file    Physically Abused: Not on file    Sexually Abused: Not on file   Housing Stability:     Unable to Pay for Housing in the Last Year: Not on file    Number of Places Lived in the Last Year: Not on file    Unstable Housing in the Last Year: Not on file      Family History   Problem Relation Age of Onset    Suicide Father     Depression Father     Depression Mother     Other Sister         Kidney Issues    No Known Problems Sister     No Known Problems Sister     Hypertension Maternal Grandfather     Diabetes Maternal Grandfather     Cancer Paternal Grandmother         lung      Visit Vitals  /74   Pulse 74   Resp 16   Ht 5' 10\" (1.778 m)   Wt 122.5 kg (270 lb)   SpO2 98%   BMI 38.74 kg/m²        Review of Systems:   A comprehensive review of systems was negative except for that written in the HPI. Neuro Exam:     Appearance: The patient is well developed, well nourished, provides a coherent history and is in no acute distress. Mental Status: Oriented to time, place and person. Mood and affect appropriate. Cranial Nerves:   Intact visual fields. Fundi are benign. MONSTER, EOM's full, no nystagmus, no ptosis. Facial sensation is normal. Corneal reflexes are intact. Facial movement is symmetric. Hearing is normal bilaterally. Palate is midline with normal sternocleidomastoid and trapezius muscles are normal. Tongue is midline. Motor:  5/5 strength in upper and lower proximal and distal muscles. Normal bulk and tone. No fasciculations. Reflexes:   Deep tendon reflexes 2+/4 and symmetrical.   Sensory:   Normal to touch, pinprick and vibration. Gait:  Normal gait. Tremor:   No tremor noted. Cerebellar:  No cerebellar signs present. Neurovascular:  Normal heart sounds and regular rhythm, peripheral pulses intact, and no carotid bruits. Assessment:   Migraine headaches. Will renew the Relpax and Topamax by request.  Will suggest a revisit in 6 months. Hopefully is fully recouped from the Covid experience several months ago. Plan:   Revisit 6 months.   Signed by :  Pedro Mcdonough MD

## 2022-04-13 NOTE — PATIENT INSTRUCTIONS
Patient history reviewed patient examined. Will recommend continuation of Relpax in the Topamax as previously prescribed. Will renew those medicines by request today. Stay safe and suggest revisit in 6 months.

## 2022-07-07 ENCOUNTER — TELEPHONE (OUTPATIENT)
Dept: NEUROLOGY | Age: 52
End: 2022-07-07

## 2022-07-07 NOTE — TELEPHONE ENCOUNTER
Patient calling about a prescription for (Relpax 40 mg tablet). She stated the pharmacy says they don't have one . Please call.

## 2022-07-08 DIAGNOSIS — G43.009 MIGRAINE WITHOUT AURA AND WITHOUT STATUS MIGRAINOSUS, NOT INTRACTABLE: ICD-10-CM

## 2022-07-08 RX ORDER — ELETRIPTAN HYDROBROMIDE 40 MG/1
TABLET, FILM COATED ORAL
Qty: 36 TABLET | Refills: 1 | Status: SHIPPED | OUTPATIENT
Start: 2022-07-08 | End: 2022-10-14

## 2022-07-11 ENCOUNTER — OFFICE VISIT (OUTPATIENT)
Dept: INTERNAL MEDICINE CLINIC | Age: 52
End: 2022-07-11
Payer: COMMERCIAL

## 2022-07-11 VITALS
TEMPERATURE: 99 F | BODY MASS INDEX: 40.86 KG/M2 | WEIGHT: 285.4 LBS | HEIGHT: 70 IN | RESPIRATION RATE: 16 BRPM | HEART RATE: 83 BPM | DIASTOLIC BLOOD PRESSURE: 79 MMHG | SYSTOLIC BLOOD PRESSURE: 130 MMHG | OXYGEN SATURATION: 99 %

## 2022-07-11 DIAGNOSIS — Z12.31 ENCOUNTER FOR SCREENING MAMMOGRAM FOR MALIGNANT NEOPLASM OF BREAST: ICD-10-CM

## 2022-07-11 DIAGNOSIS — D50.9 IRON DEFICIENCY ANEMIA, UNSPECIFIED IRON DEFICIENCY ANEMIA TYPE: ICD-10-CM

## 2022-07-11 DIAGNOSIS — Z11.59 ENCOUNTER FOR HEPATITIS C SCREENING TEST FOR LOW RISK PATIENT: ICD-10-CM

## 2022-07-11 DIAGNOSIS — E66.01 CLASS 3 SEVERE OBESITY WITHOUT SERIOUS COMORBIDITY WITH BODY MASS INDEX (BMI) OF 40.0 TO 44.9 IN ADULT, UNSPECIFIED OBESITY TYPE (HCC): ICD-10-CM

## 2022-07-11 DIAGNOSIS — E78.5 DYSLIPIDEMIA: ICD-10-CM

## 2022-07-11 DIAGNOSIS — Z12.11 SCREEN FOR COLON CANCER: ICD-10-CM

## 2022-07-11 DIAGNOSIS — R60.0 EDEMA OF BOTH LOWER LEGS: Primary | ICD-10-CM

## 2022-07-11 PROCEDURE — 99214 OFFICE O/P EST MOD 30 MIN: CPT | Performed by: INTERNAL MEDICINE

## 2022-07-11 RX ORDER — BISMUTH SUBSALICYLATE 262 MG
1 TABLET,CHEWABLE ORAL DAILY
COMMUNITY

## 2022-07-11 RX ORDER — ETODOLAC 500 MG/1
500 TABLET, FILM COATED ORAL 2 TIMES DAILY
COMMUNITY
Start: 2022-06-12 | End: 2022-10-20 | Stop reason: ALTCHOICE

## 2022-07-11 RX ORDER — PHENTERMINE HYDROCHLORIDE 37.5 MG/1
TABLET ORAL
Qty: 30 TABLET | Refills: 2 | Status: SHIPPED | OUTPATIENT
Start: 2022-07-11 | End: 2022-10-20

## 2022-07-11 RX ORDER — FUROSEMIDE 20 MG/1
20 TABLET ORAL DAILY
Qty: 30 TABLET | Refills: 1 | Status: SHIPPED | OUTPATIENT
Start: 2022-07-11 | End: 2022-10-14

## 2022-07-11 NOTE — PROGRESS NOTES
HISTORY OF PRESENT ILLNESS    Chief Complaint   Patient presents with    Foot Swelling     May be due to weight gain - discoloration - numbness and tingling.  Ankle swelling    Leg Swelling    Weight Management       Presents for follow-up    Working long hours for [employer] on [place] vaccine, recent approval for children. Sits all day. Compression stocking dont help. Edema of legs  L > R.  Chronic. Pitting. Can not wear shoes some day. No salt excess in diet. Weight gain continues. Limited diet due to stress at work. Eats Kind bar and Vitamin Water only during the day. Hunter Bah is usually a healthy male. Ate  salad last night. Rare alcohol intake. Sleep is poor due to shoulder pain and stress at work. Wt Readings from Last 3 Encounters:   22 285 lb 6.4 oz (129.5 kg)   22 270 lb (122.5 kg)   21 271 lb (122.9 kg)   Body mass index is 40.95 kg/m². Right frozen shoulder now  Did PT. Seeing pain management. Review of Systems   All other systems reviewed and are negative, except as noted in HPI    Past Medical and Surgical History   has a past medical history of Chest pain, Dyslipidemia, Facet hypertrophy of cervical region (), Iron deficiency anemia, Migraine, Obesity (BMI 35.0-39.9 without comorbidity) (2018), Pap smear for cervical cancer screening, RAD (reactive airway disease) (2020), and Spondylolisthesis, lumbar region (2018). has a past surgical history that includes hx  section; hx tubal ligation; hx lumbar diskectomy (); and hx lumbar fusion (N/A, 2018). reports that she has never smoked. She has never used smokeless tobacco. She reports current alcohol use of about 1.0 standard drink of alcohol per week. She reports that she does not use drugs.   family history includes Cancer in her paternal grandmother; Depression in her father and mother; Diabetes in her maternal grandfather; Hypertension in her maternal grandfather; No Known Problems in her sister and sister; Other in her sister; Suicide in her father. Physical Exam   Nursing note and vitals reviewed. Blood pressure 130/79, pulse 83, temperature 99 °F (37.2 °C), temperature source Oral, resp. rate 16, height 5' 10\" (1.778 m), weight 285 lb 6.4 oz (129.5 kg), last menstrual period 06/20/2022, SpO2 99 %. Constitutional:  No distress. Eyes: Conjunctivae are normal.   Ears:  Hearing grossly intact  Cardiovascular: Normal rate. regular rhythm, no murmurs or gallops  No edema  Pulmonary/Chest: Effort normal.   CTAB  Musculoskeletal: moves all 4 extremities   Neurological: Alert and oriented to person, place, and time. Skin: No visible rash noted. Psychiatric: Normal mood and affect. Behavior is normal.     Assessment and Plan    Diagnoses and all orders for this visit:    1. Edema of both lower legs  Trace edema which I suspect is secondary to obesity. We will check labs. Trial of furosemide. Titrate so that she has a fair amount of urination. If she is remaining at an increased rate without any improvement of edema, then discontinue furosemide. Decrease sodium in diet.  -     TSH 3RD GENERATION; Future  -     METABOLIC PANEL, COMPREHENSIVE; Future  -     CBC W/O DIFF; Future  -     furosemide (LASIX) 20 mg tablet; Take 1 Tablet by mouth daily. 2. Class 3 severe obesity without serious comorbidity with body mass index (BMI) of 40.0 to 44.9 in adult, unspecified obesity type ([address]  Severe obesity which is really exacerbated by lack of physical activity. She has gained 20 pounds in the past year and 50 pounds in the last 7 years. She has failed dietary measures. Needs to increase exercise is much as possible, but very challenging given her current work situation. She is taking topiramate. Recommend trial of phentermine for 3 months. She may benefit from a GLP-1 medication if available and affordable.   Could consider consultation with bariatric surgery as well. -     phentermine (ADIPEX-P) 37.5 mg tablet; Take 1/2 pill in AM 1/2 pill in afternoon  Indications: weight loss management for an obese person    3. Dyslipidemia  The patient is asked to make an attempt to improve diet and exercise patterns to aid in medical management of this problem  -     LIPID PANEL; Future  -     HEMOGLOBIN A1C WITH EAG; Future    4. Iron deficiency anemia, unspecified iron deficiency anemia type  Unclear current status. Monitoring. Not taking iron. -     IRON PROFILE; Future    5. Encounter for hepatitis C screening test for low risk patient  -     HEPATITIS C AB; Future    6. Screen for colon cancer  -     REFERRAL TO GASTROENTEROLOGY    Screening mammogram ordered      lab results and schedule of future lab studies reviewed with patient  reviewed medications and side effects in detail    Return to clinic for further evaluation if new symptoms develop        Current Outpatient Medications   Medication Sig    multivitamin (ONE A DAY) tablet Take 1 Tablet by mouth daily.  furosemide (LASIX) 20 mg tablet Take 1 Tablet by mouth daily.  etodolac (LODINE) 500 mg tablet Take 500 mg by mouth two (2) times a day.  eletriptan (Relpax) 40 mg tablet TAKE 1 TABLET BY MOUTH ONCE AS NEEDED. MAY REPEAT IN 2 HOURS IF NEEDED  Indications: a migraine headache    topiramate (TOPAMAX) 200 mg tablet 1 in the AM and 1-1/2 in the p.m.225    budesonide-formoteroL (SYMBICORT) 160-4.5 mcg/actuation HFAA Take 2 Puffs by inhalation two (2) times a day.  topiramate (TOPAMAX) 200 mg tablet TAKE 1 TABLET EVERY MORNINGAND 1 AND 1/2 TABLETS EVERYEVENING FOR MIGRAINE PREVENTION.  hydrocortisone valerate (WESTCORT) 0.2 % topical cream Apply  to affected area two (2) times a day. use thin layer. For seborrhea eyelids     No current facility-administered medications for this visit.

## 2022-07-12 LAB
ALBUMIN SERPL-MCNC: 3.6 G/DL (ref 3.5–5)
ALBUMIN/GLOB SERPL: 1 {RATIO} (ref 1.1–2.2)
ALP SERPL-CCNC: 83 U/L (ref 45–117)
ALT SERPL-CCNC: 23 U/L (ref 12–78)
ANION GAP SERPL CALC-SCNC: 6 MMOL/L (ref 5–15)
AST SERPL-CCNC: 13 U/L (ref 15–37)
BILIRUB SERPL-MCNC: 0.2 MG/DL (ref 0.2–1)
BUN SERPL-MCNC: 10 MG/DL (ref 6–20)
BUN/CREAT SERPL: 12 (ref 12–20)
CALCIUM SERPL-MCNC: 9.4 MG/DL (ref 8.5–10.1)
CHLORIDE SERPL-SCNC: 110 MMOL/L (ref 97–108)
CHOLEST SERPL-MCNC: 237 MG/DL
CO2 SERPL-SCNC: 23 MMOL/L (ref 21–32)
CREAT SERPL-MCNC: 0.85 MG/DL (ref 0.55–1.02)
ERYTHROCYTE [DISTWIDTH] IN BLOOD BY AUTOMATED COUNT: 14.2 % (ref 11.5–14.5)
EST. AVERAGE GLUCOSE BLD GHB EST-MCNC: 111 MG/DL
GLOBULIN SER CALC-MCNC: 3.5 G/DL (ref 2–4)
GLUCOSE SERPL-MCNC: 99 MG/DL (ref 65–100)
HBA1C MFR BLD: 5.5 % (ref 4–5.6)
HCT VFR BLD AUTO: 40.1 % (ref 35–47)
HCV AB SERPL QL IA: NONREACTIVE
HDLC SERPL-MCNC: 50 MG/DL
HDLC SERPL: 4.7 {RATIO} (ref 0–5)
HGB BLD-MCNC: 12.7 G/DL (ref 11.5–16)
IRON SATN MFR SERPL: 13 % (ref 20–50)
IRON SERPL-MCNC: 65 UG/DL (ref 35–150)
LDLC SERPL CALC-MCNC: 147.8 MG/DL (ref 0–100)
MCH RBC QN AUTO: 28 PG (ref 26–34)
MCHC RBC AUTO-ENTMCNC: 31.7 G/DL (ref 30–36.5)
MCV RBC AUTO: 88.3 FL (ref 80–99)
NRBC # BLD: 0 K/UL (ref 0–0.01)
NRBC BLD-RTO: 0 PER 100 WBC
PLATELET # BLD AUTO: 316 K/UL (ref 150–400)
PMV BLD AUTO: 11.3 FL (ref 8.9–12.9)
POTASSIUM SERPL-SCNC: 4.2 MMOL/L (ref 3.5–5.1)
PROT SERPL-MCNC: 7.1 G/DL (ref 6.4–8.2)
RBC # BLD AUTO: 4.54 M/UL (ref 3.8–5.2)
SODIUM SERPL-SCNC: 139 MMOL/L (ref 136–145)
TIBC SERPL-MCNC: 489 UG/DL (ref 250–450)
TRIGL SERPL-MCNC: 196 MG/DL (ref ?–150)
TSH SERPL DL<=0.05 MIU/L-ACNC: 1.56 UIU/ML (ref 0.36–3.74)
VLDLC SERPL CALC-MCNC: 39.2 MG/DL
WBC # BLD AUTO: 4.5 K/UL (ref 3.6–11)

## 2022-10-14 DIAGNOSIS — R60.0 EDEMA OF BOTH LOWER LEGS: ICD-10-CM

## 2022-10-14 DIAGNOSIS — G43.009 MIGRAINE WITHOUT AURA AND WITHOUT STATUS MIGRAINOSUS, NOT INTRACTABLE: ICD-10-CM

## 2022-10-14 DIAGNOSIS — E66.01 CLASS 3 SEVERE OBESITY WITHOUT SERIOUS COMORBIDITY WITH BODY MASS INDEX (BMI) OF 40.0 TO 44.9 IN ADULT, UNSPECIFIED OBESITY TYPE (HCC): ICD-10-CM

## 2022-10-14 RX ORDER — FUROSEMIDE 20 MG/1
TABLET ORAL
Qty: 30 TABLET | Refills: 1 | Status: SHIPPED | OUTPATIENT
Start: 2022-10-14 | End: 2022-10-30

## 2022-10-14 RX ORDER — PHENTERMINE HYDROCHLORIDE 37.5 MG/1
TABLET ORAL
Qty: 30 TABLET | Refills: 2 | OUTPATIENT
Start: 2022-10-14

## 2022-10-14 RX ORDER — ELETRIPTAN HYDROBROMIDE 40 MG/1
TABLET, FILM COATED ORAL
Qty: 36 TABLET | Refills: 1 | Status: SHIPPED | OUTPATIENT
Start: 2022-10-14

## 2022-10-14 RX ORDER — TOPIRAMATE 200 MG/1
TABLET ORAL
Qty: 225 TABLET | Refills: 0 | Status: SHIPPED | OUTPATIENT
Start: 2022-10-14

## 2022-10-17 ENCOUNTER — OFFICE VISIT (OUTPATIENT)
Dept: NEUROLOGY | Age: 52
End: 2022-10-17
Payer: COMMERCIAL

## 2022-10-17 VITALS
DIASTOLIC BLOOD PRESSURE: 81 MMHG | HEIGHT: 70 IN | WEIGHT: 285 LBS | BODY MASS INDEX: 40.8 KG/M2 | RESPIRATION RATE: 16 BRPM | HEART RATE: 68 BPM | OXYGEN SATURATION: 100 % | SYSTOLIC BLOOD PRESSURE: 152 MMHG

## 2022-10-17 DIAGNOSIS — E66.01 CLASS 3 SEVERE OBESITY WITHOUT SERIOUS COMORBIDITY WITH BODY MASS INDEX (BMI) OF 40.0 TO 44.9 IN ADULT, UNSPECIFIED OBESITY TYPE (HCC): ICD-10-CM

## 2022-10-17 DIAGNOSIS — G43.009 MIGRAINE WITHOUT AURA AND WITHOUT STATUS MIGRAINOSUS, NOT INTRACTABLE: Primary | ICD-10-CM

## 2022-10-17 PROCEDURE — 99213 OFFICE O/P EST LOW 20 MIN: CPT | Performed by: SPECIALIST

## 2022-10-17 RX ORDER — PHENTERMINE HYDROCHLORIDE 37.5 MG/1
TABLET ORAL
Qty: 30 TABLET | Refills: 2 | OUTPATIENT
Start: 2022-10-17

## 2022-10-17 NOTE — TELEPHONE ENCOUNTER
Declined refill. Pt was given 3 month trial in July and needs folllow-up to discuss other options.   .   Appears no weight loss based on neurology visit today

## 2022-10-17 NOTE — PROGRESS NOTES
Visit Vitals  BP (!) 152/81   Pulse 68   Resp 16   Ht 5' 10\" (1.778 m)   Wt 285 lb (129.3 kg)   SpO2 100%   BMI 40.89 kg/m²      Chief Complaint   Patient presents with    Migraine     Patient is here for migraines . Patient reports that she has at least 1 a week. Medication is doing well .

## 2022-10-17 NOTE — PROGRESS NOTES
Neurology Consult      Subjective:      Ronna Mccray is a 47 y.o. female who comes in today on revisit of migraine headaches. Is doing status quo and is working well with the baseline Relpax sometimes enhanced with Advil along with the Topamax 200 mg 1 in the AM and one half in the PM.  Does not recite any new medical or surgical problems. Medicine will be retained the way it is and as always the cheap advice is to get good sleep minimize stress and regular physical activity could help the cause. Exam looks normal today and has been able to continue her go to medicines for years now. I am hoping that can continue. Current Outpatient Medications   Medication Sig Dispense Refill    eletriptan (RELPAX) 40 mg tablet TAKE 1 TABLET BY MOUTH ONCE AS NEEDED. MAY REPEAT IN 2 HOURS IF NEEDED FOR MIGRAINE HEADACHE 36 Tablet 1    topiramate (TOPAMAX) 200 mg tablet TAKE 1 TABLET BY MOUTH IN THE AM AND 1-1/2 IN THE P.M 225 Tablet 0    furosemide (LASIX) 20 mg tablet TAKE 1 TABLET BY MOUTH EVERY DAY 30 Tablet 1    multivitamin (ONE A DAY) tablet Take 1 Tablet by mouth daily. phentermine (ADIPEX-P) 37.5 mg tablet Take 1/2 pill in AM 1/2 pill in afternoon  Indications: weight loss management for an obese person 30 Tablet 2    budesonide-formoteroL (SYMBICORT) 160-4.5 mcg/actuation HFAA Take 2 Puffs by inhalation two (2) times a day. 10.2 g 1    hydrocortisone valerate (WESTCORT) 0.2 % topical cream Apply  to affected area two (2) times a day. use thin layer. For seborrhea eyelids 15 g 3    etodolac (LODINE) 500 mg tablet Take 500 mg by mouth two (2) times a day.  (Patient not taking: Reported on 10/17/2022)        No Known Allergies  Past Medical History:   Diagnosis Date    Chest pain     atypical saw cardiology Dr. Johana Cadet     Dyslipidemia     mild    Facet hypertrophy of cervical region 2015    L C4-C5    Iron deficiency anemia     chronic. takes iron     Migraine     Dr. Yoko Werner    Obesity (BMI 35.0-39.9 without comorbidity) 2018    Pap smear for cervical cancer screening     Dr. Felipe Irwin. RAD (reactive airway disease) 2020    Dr. Damien Negrete    Spondylolisthesis, lumbar region 2018    saw Dr. Raul Alexandra, Dr. Jeremías Guerrero, Dr. Hunter Daniels. hx spinal surgery ,       Past Surgical History:   Procedure Laterality Date    HX  SECTION      HX LUMBAR DISKECTOMY      L 5 S1    HX LUMBAR FUSION N/A 2018    L4-S1 ALIF. Dr. Nimesh Ervin TUBAL LIGATION        Social History     Socioeconomic History    Marital status:      Spouse name: Zulma Johnson    Number of children: 2    Years of education: Not on file    Highest education level: Not on file   Occupational History     Employer: [employer]   Tobacco Use    Smoking status: Never    Smokeless tobacco: Never   Substance and Sexual Activity    Alcohol use:  Yes     Alcohol/week: 1.0 standard drink     Types: 1 Glasses of wine per week     Comment: x1 a month    Drug use: No    Sexual activity: Yes     Partners: Male     Birth control/protection: Surgical   Other Topics Concern    Not on file   Social History Narrative    Not on file     Social Determinants of Health     Financial Resource Strain: Not on file   Food Insecurity: Not on file   Transportation Needs: Not on file   Physical Activity: Not on file   Stress: Not on file   Social Connections: Not on file   Intimate Partner Violence: Not on file   Housing Stability: Not on file      Family History   Problem Relation Age of Onset    Suicide Father     Depression Father     Depression Mother     Other Sister         Kidney Issues    No Known Problems Sister     No Known Problems Sister     Hypertension Maternal Grandfather     Diabetes Maternal Grandfather     Cancer Paternal Grandmother         lung      Visit Vitals  BP (!) 152/81   Pulse 68   Resp 16   Ht 5' 10\" (1.778 m)   Wt 129.3 kg (285 lb)   SpO2 100%   BMI 40.89 kg/m²        Review of Systems:   A comprehensive review of systems was negative except for that written in the HPI. Neuro Exam:     Appearance: The patient is well developed, well nourished, provides a coherent history and is in no acute distress. Mental Status: Oriented to time, place and person. Mood and affect appropriate. Cranial Nerves:   Intact visual fields. Fundi are benign. MONSTER, EOM's full, no nystagmus, no ptosis. Facial sensation is normal. Corneal reflexes are intact. Facial movement is symmetric. Hearing is normal bilaterally. Palate is midline with normal sternocleidomastoid and trapezius muscles are normal. Tongue is midline. Motor:  5/5 strength in upper and lower proximal and distal muscles. Normal bulk and tone. No fasciculations. Reflexes:   Deep tendon reflexes 2+/4 and symmetrical.   Sensory:   Normal to touch, pinprick and vibration. Gait:  Normal gait. Tremor:   No tremor noted. Cerebellar:  No cerebellar signs present. Neurovascular:  Normal heart sounds and regular rhythm, peripheral pulses intact, and no carotid bruits. Assessment:   Migraine. Is reasonably comfortable with medicine outlined above which by way of recall includes Relpax as needed plus or minus over-the-counter remedies such as Advil with Topamax 200 mg 1 in the AM 1-1/2 in the p.m. as a preventative. Is doing well and would suggest a revisit in 6 months. Plan:   Revisit 6 months.   Signed by :  Alberto Cedillo MD

## 2022-10-17 NOTE — PATIENT INSTRUCTIONS
Patient history viewed patient examined. Will recommend continuation of the Relpax and the Topamax as it is. Does not recite any new medical or surgical history and would simply suggest revisit in 6 months. Good luck.

## 2022-10-17 NOTE — LETTER
10/17/2022    Patient: Tessa Becker   YOB: 1970   Date of Visit: 10/17/2022     Bruce Esquivel MD  [facility]  [address] [facility] [address]    Dear Bruce Esquivel MD,      Thank you for referring Ms. Branden Kumar to [facility] for evaluation. My notes for this consultation are attached. If you have questions, please do not hesitate to call me. I look forward to following your patient along with you.       Sincerely,    Breanna Sanabria MD

## 2022-10-18 NOTE — TELEPHONE ENCOUNTER
Spoke with patient and update don Dr. Magaly Harrison comment and need for a follow up appt before a medication refill. Patient scheduled for 10/20/22 at  3:40 PM.  Grateful for the call.

## 2022-10-20 ENCOUNTER — OFFICE VISIT (OUTPATIENT)
Dept: INTERNAL MEDICINE CLINIC | Age: 52
End: 2022-10-20
Payer: COMMERCIAL

## 2022-10-20 VITALS
BODY MASS INDEX: 37.22 KG/M2 | TEMPERATURE: 98.8 F | HEIGHT: 70 IN | DIASTOLIC BLOOD PRESSURE: 88 MMHG | OXYGEN SATURATION: 98 % | RESPIRATION RATE: 16 BRPM | SYSTOLIC BLOOD PRESSURE: 121 MMHG | WEIGHT: 260 LBS | HEART RATE: 91 BPM

## 2022-10-20 DIAGNOSIS — Z23 NEEDS FLU SHOT: ICD-10-CM

## 2022-10-20 DIAGNOSIS — E66.01 SEVERE OBESITY WITH BODY MASS INDEX (BMI) OF 35.0 TO 39.9 WITH SERIOUS COMORBIDITY (HCC): Primary | ICD-10-CM

## 2022-10-20 PROCEDURE — 90471 IMMUNIZATION ADMIN: CPT | Performed by: INTERNAL MEDICINE

## 2022-10-20 PROCEDURE — 90686 IIV4 VACC NO PRSV 0.5 ML IM: CPT | Performed by: INTERNAL MEDICINE

## 2022-10-20 PROCEDURE — 99213 OFFICE O/P EST LOW 20 MIN: CPT | Performed by: INTERNAL MEDICINE

## 2022-10-20 RX ORDER — PHENTERMINE HYDROCHLORIDE 37.5 MG/1
TABLET ORAL
Qty: 30 TABLET | Refills: 3 | Status: SHIPPED | OUTPATIENT
Start: 2022-10-20

## 2022-10-20 NOTE — PATIENT INSTRUCTIONS
Vaccine Information Statement    Influenza (Flu) Vaccine (Inactivated or Recombinant): What You Need to Know    Many vaccine information statements are available in Korean and other languages. See www.immunize.org/vis. Hojas de información sobre vacunas están disponibles en español y en muchos otros idiomas. Visite www.immunize.org/vis. 1. Why get vaccinated? Influenza vaccine can prevent influenza (flu). Flu is a contagious disease that spreads around the [facility] every year, usually between October and May. Anyone can get the flu, but it is more dangerous for some people. Infants and young children, people 72 years and older, pregnant people, and people with certain health conditions or a weakened immune system are at greatest risk of flu complications. Pneumonia, bronchitis, sinus infections, and ear infections are examples of flu-related complications. If you have a medical condition, such as heart disease, cancer, or diabetes, flu can make it worse. Flu can cause fever and chills, sore throat, muscle aches, fatigue, cough, headache, and runny or stuffy nose. Some people may have vomiting and diarrhea, though this is more common in children than adults. In an average year, thousands of people in the [facility] die from flu, and many more are hospitalized. Flu vaccine prevents millions of illnesses and flu-related visits to the doctor each year. 2. Influenza vaccines     CDC recommends everyone 6 months and older get vaccinated every flu season. Children 6 months through 6years of age may need 2 doses during a single flu season. Everyone else needs only 1 dose each flu season. It takes about 2 weeks for protection to develop after vaccination. There are many flu viruses, and they are always changing. Each year a new flu vaccine is made to protect against the influenza viruses believed to be likely to cause disease in the upcoming flu season.  Even when the vaccine doesnt exactly match these viruses, it may still provide some protection. Influenza vaccine does not cause flu. Influenza vaccine may be given at the same time as other vaccines. 3. Talk with your health care provider    Tell your vaccination provider if the person getting the vaccine:  Has had an allergic reaction after a previous dose of influenza vaccine, or has any severe, life-threatening allergies   Has ever had Guillain-Barré Syndrome (also called GBS)    In some cases, your health care provider may decide to postpone influenza vaccination until a future visit. Influenza vaccine can be administered at any time during pregnancy. People who are or will be pregnant during influenza season should receive inactivated influenza vaccine. People with minor illnesses, such as a cold, may be vaccinated. People who are moderately or severely ill should usually wait until they recover before getting influenza vaccine. Your health care provider can give you more information. 4. Risks of a vaccine reaction    Soreness, redness, and swelling where the shot is given, fever, muscle aches, and headache can happen after influenza vaccination. There may be a very small increased risk of Guillain-Barré Syndrome (GBS) after inactivated influenza vaccine (the flu shot). Deepthi Hannah children who get the flu shot along with pneumococcal vaccine (PCV13) and/or DTaP vaccine at the same time might be slightly more likely to have a seizure caused by fever. Tell your health care provider if a child who is getting flu vaccine has ever had a seizure. People sometimes faint after medical procedures, including vaccination. Tell your provider if you feel dizzy or have vision changes or ringing in the ears. As with any medicine, there is a very remote chance of a vaccine causing a severe allergic reaction, other serious injury, or death. 5. What if there is a serious problem?     An allergic reaction could occur after the vaccinated person leaves the clinic. If you see signs of a severe allergic reaction (hives, swelling of the face and throat, difficulty breathing, a fast heartbeat, dizziness, or weakness), call 9-1-1 and get the person to the nearest hospital.    For other signs that concern you, call your health care provider. Adverse reactions should be reported to the Vaccine Adverse Event Reporting System (VAERS). Your health care provider will usually file this report, or you can do it yourself. Visit the VAERS website at www.vaers. [facility].gov or call 7-790.651.8979. VAERS is only for reporting reactions, and VAERS staff members do not give medical advice. 6. The National Vaccine Injury Compensation Program    The [facility] Vaccine Injury Compensation Program (VICP) is a federal program that was created to compensate people who may have been injured by certain vaccines. Claims regarding alleged injury or death due to vaccination have a time limit for filing, which may be as short as two years. Visit the VICP website at www.[facility].gov/vaccinecompensation or call 2-273.535.7949 to learn about the program and about filing a claim. 7. How can I learn more? Ask your health care provider. Call your local or state health department. Visit the website of the Food and Drug Administration (FDA) for vaccine package inserts and additional information at www.fda.gov/vaccines-blood-biologics/vaccines. Contact the Centers for Disease Control and Prevention (CDC): Call 6-393.363.8455 (1-800-CDC-INFO) or  Visit CDCs influenza website at www.cdc.gov/flu. Vaccine Information Statement   Inactivated Influenza Vaccine   8/6/2021  42 JANIE Jennings [id number]   Department of Health and Human Services  Centers for Disease Control and Prevention    Office Use Only

## 2022-10-23 NOTE — PROGRESS NOTES
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 Admits that she does not often leave her home since she is in meetings and working all of the time. Presents for follow-up of morbid obesity. Patient was seen by me for this on 2022 and was started on a trial of phentermine 1/2 pill twice daily. She states that it has helped curb her appetite and improve metabolism. She has lost 25 pounds in 3 months based on this medical therapy. Note that her weight was recorded inaccurately at neurology visit on . Wt Readings from Last 3 Encounters:   10/20/22 260 lb (117.9 kg)   10/17/22 285 lb (129.3 kg)   22 285 lb 6.4 oz (129.5 kg)     She states that she has not had time for exercise. Rarely has time to leave her seat some days. She states that her diet has been sporadic but she is trying to be compliant with low carbohydrates is much as possible. Review of Systems   All other systems reviewed and are negative, except as noted in HPI    Past Medical and Surgical History   has a past medical history of Chest pain, COVID-19 (2022), Dyslipidemia, Facet hypertrophy of cervical region (), Iron deficiency anemia, Migraine, Obesity (BMI 35.0-39.9 without comorbidity) (2018), Pap smear for cervical cancer screening, RAD (reactive airway disease) (2020), and Spondylolisthesis, lumbar region (2018). has a past surgical history that includes hx  section; hx tubal ligation; hx lumbar diskectomy (); and hx lumbar fusion (N/A, 2018). reports that she has never smoked. She has never used smokeless tobacco. She reports current alcohol use of about 1.0 standard drink per week.  She reports that she does not use drugs. family history includes Cancer in her paternal grandmother; Depression in her father and mother; Diabetes in her maternal grandfather; Hypertension in her maternal grandfather; No Known Problems in her sister and sister; Other in her sister; Suicide in her father. Physical Exam   Nursing note and vitals reviewed. Blood pressure 121/88, pulse 91, temperature 98.8 °F (37.1 °C), temperature source Oral, resp. rate 16, height 5' 10\" (1.778 m), weight 260 lb (117.9 kg), last menstrual period 10/06/2022, SpO2 98 %. Constitutional:  No distress. Eyes: Conjunctivae are normal.   Ears:  Hearing grossly intact  Cardiovascular: Normal rate. regular rhythm, no murmurs or gallops  No edema  Pulmonary/Chest: Effort normal.   CTAB  Musculoskeletal: moves all 4 extremities   Neurological: Alert and oriented to person, place, and time. Skin: No visible rash noted. Psychiatric: Normal mood and affect. Behavior is normal.     Assessment and Plan    Diagnoses and all orders for this visit:    1. Severe obesity with body mass index (BMI) of 35.0 to 39.9 with serious comorbidity ([facility] [address]  This condition is controlled by medication therapy with topamax and phentermine. Refilled medications. She has had an excellent response to medical therapy. Discussed how this is not a long-term option, but will go 3-4 more months to see how she does. Lifestyle has been very challenging given her current work commitments, but I have strongly encouraged her to make time to exercise more and eat a healthy diet with small portions and low carbs. -     phentermine (ADIPEX-P) 37.5 mg tablet; Take 1/2 pill in AM 1/2 pill in afternoon  Indications: weight loss management for an obese person    2.  Needs flu shot  -     INFLUENZA, FLUARIX, FLULAVAL, FLUZONE (AGE 6 MO+), AFLURIA(AGE 3Y+) IM, PF, 0.5 ML      lab results and schedule of future lab studies reviewed with patient  reviewed medications and side effects in detail    Return to clinic for further evaluation if new symptoms develop      Current Outpatient Medications   Medication Sig    phentermine (ADIPEX-P) 37.5 mg tablet Take 1/2 pill in AM 1/2 pill in afternoon  Indications: weight loss management for an obese person    eletriptan (RELPAX) 40 mg tablet TAKE 1 TABLET BY MOUTH ONCE AS NEEDED. MAY REPEAT IN 2 HOURS IF NEEDED FOR MIGRAINE HEADACHE    topiramate (TOPAMAX) 200 mg tablet TAKE 1 TABLET BY MOUTH IN THE AM AND 1-1/2 IN THE P.M    furosemide (LASIX) 20 mg tablet TAKE 1 TABLET BY MOUTH EVERY DAY    multivitamin (ONE A DAY) tablet Take 1 Tablet by mouth daily. hydrocortisone valerate (WESTCORT) 0.2 % topical cream Apply  to affected area two (2) times a day. use thin layer. For seborrhea eyelids     No current facility-administered medications for this visit.

## 2022-10-30 DIAGNOSIS — R60.0 EDEMA OF BOTH LOWER LEGS: ICD-10-CM

## 2022-10-30 RX ORDER — FUROSEMIDE 20 MG/1
TABLET ORAL
Qty: 90 TABLET | Refills: 1 | Status: SHIPPED | OUTPATIENT
Start: 2022-10-30

## 2022-12-28 DIAGNOSIS — G43.009 MIGRAINE WITHOUT AURA AND WITHOUT STATUS MIGRAINOSUS, NOT INTRACTABLE: ICD-10-CM

## 2022-12-28 RX ORDER — ELETRIPTAN HYDROBROMIDE 40 MG/1
TABLET, FILM COATED ORAL
Qty: 36 TABLET | Refills: 1 | Status: SHIPPED | OUTPATIENT
Start: 2022-12-28

## 2023-01-15 DIAGNOSIS — L21.9 SEBORRHEIC DERMATITIS: ICD-10-CM

## 2023-01-15 RX ORDER — HYDROCORTISONE VALERATE CREAM 2 MG/G
CREAM TOPICAL
Qty: 15 G | Refills: 3 | Status: SHIPPED | OUTPATIENT
Start: 2023-01-15

## 2023-04-20 ENCOUNTER — OFFICE VISIT (OUTPATIENT)
Dept: NEUROLOGY | Age: 53
End: 2023-04-20

## 2023-04-20 VITALS — HEART RATE: 81 BPM | OXYGEN SATURATION: 99 % | SYSTOLIC BLOOD PRESSURE: 126 MMHG | DIASTOLIC BLOOD PRESSURE: 84 MMHG

## 2023-04-20 DIAGNOSIS — G43.009 MIGRAINE WITHOUT AURA AND WITHOUT STATUS MIGRAINOSUS, NOT INTRACTABLE: Primary | ICD-10-CM

## 2023-04-20 RX ORDER — TOPIRAMATE 200 MG/1
TABLET ORAL
Qty: 225 TABLET | Refills: 4 | Status: SHIPPED | OUTPATIENT
Start: 2023-04-20

## 2023-04-20 RX ORDER — ELETRIPTAN HYDROBROMIDE 40 MG/1
TABLET, FILM COATED ORAL
Qty: 36 TABLET | Refills: 1 | Status: SHIPPED | OUTPATIENT
Start: 2023-04-20

## 2023-04-20 NOTE — PROGRESS NOTES
Migraines unchanged since LOV  Monthly about 2-3 but tyipically last 3 days  Triptan helps make manageable

## 2023-04-21 NOTE — PROGRESS NOTES
Julien Fermin is a 47 y.o. female who presents with the following  Chief Complaint   Patient presents with    Follow-up       HPI    FU migraine   With    Working a lot   Stress high   Increased with weather, computer. Technology, allergies. On Topamax 200 in AM and 300 mg PM   Relpax works really well for PRN   She states her headaches are not any different. Feeling like they are managing well.  agrees. No Known Allergies    Current Outpatient Medications   Medication Sig    eletriptan (RELPAX) 40 mg tablet TAKE 1 TABLET BY MOUTH ONCE AS NEEDED, MAY REPEAT IN 2 HOURS IF NEEDED    topiramate (TOPAMAX) 200 mg tablet TAKE 1 TABLET BY MOUTH IN THE AM AND 1-1/2 IN THE P.M    hydrocortisone valerate (WESTCORT) 0.2 % topical cream APPLY TO AFFECTED AREA TWICE A DAY     No current facility-administered medications for this visit. Social History     Tobacco Use   Smoking Status Never   Smokeless Tobacco Never       Past Medical History:   Diagnosis Date    Chest pain     atypical saw cardiology Dr. Anabell Hall     COVID-19 2022    Dyslipidemia     mild    Facet hypertrophy of cervical region     L C4-C5    Iron deficiency anemia     chronic. takes iron     Migraine     Dr. Iker Hurley    Obesity (BMI 35.0-39.9 without comorbidity) 2018    Pap smear for cervical cancer screening     Dr. Jackie Brunson. RAD (reactive airway disease) 2020    Dr. Margarito Caicedo    Spondylolisthesis, lumbar region 2018    saw Dr. Jelena Santizo, Dr. Noe Trammell, Dr. Lorena Amaya. hx spinal surgery ,        Past Surgical History:   Procedure Laterality Date    HX  SECTION      HX LUMBAR DISKECTOMY      L 5 S1    HX LUMBAR FUSION N/A 2018    L4-S1 ALIF.   Dr. Maritza Rodriguez TUBAL LIGATION         Family History   Problem Relation Age of Onset    Suicide Father     Depression Father     Depression Mother     Other Sister         Kidney Issues    No Known Problems Sister     No Known Problems Sister Hypertension Maternal Grandfather     Diabetes Maternal Grandfather     Cancer Paternal Grandmother         lung       Social History     Socioeconomic History    Marital status:      Spouse name: Angelica Tillman    Number of children: 2   Occupational History     Employer: [employer]   Tobacco Use    Smoking status: Never    Smokeless tobacco: Never   Substance and Sexual Activity    Alcohol use: Yes     Alcohol/week: 1.0 standard drink     Types: 1 Glasses of wine per week     Comment: x1 a month    Drug use: No    Sexual activity: Yes     Partners: Male     Birth control/protection: Surgical       Review of Systems   Eyes:  Positive for blurred vision and photophobia. Negative for double vision. Respiratory:  Negative for shortness of breath and wheezing. Cardiovascular:  Negative for chest pain and palpitations. Gastrointestinal:  Negative for nausea and vomiting. Neurological:  Positive for headaches. Negative for dizziness, seizures, loss of consciousness and weakness. Remainder of comprehensive review is negative. Physical Exam :    Visit Vitals  /84 (BP 1 Location: Left upper arm, BP Patient Position: Sitting, BP Cuff Size: Large adult)   Pulse 81   SpO2 99%       General: Well defined, nourished, and groomed individual in no acute distress. Musculoskeletal: Extremities revealed no edema and had full range of motion of joints. Psych: Good mood and bright affect    NEUROLOGICAL EXAMINATION:    Mental Status: Alert and oriented to person, place, and time    Cranial Nerves:    II, III, IV, VI: Visual acuity grossly intact. Visual fields are normal.    Pupils are equal, round, and reactive to light and accommodation. Extra-ocular movements are full and fluid. Fundoscopic exam was benign, no ptosis or nystagmus. V-XII: Hearing is grossly intact. Facial features are symmetric, with normal sensation and strength. The palate rises symmetrically and the tongue protrudes midline. Sternocleidomastoids 5/5. Motor Examination: Normal tone, bulk, and strength, 5/5 muscle strength throughout. Coordination: Finger to nose was normal. No resting or intention tremor    Gait and Station: Steady while walking. Normal arm swing. No pronator drift. No muscle wasting or fasiculations noted. Reflexes: DTRs 2+ throughout. Results for orders placed or performed in visit on 08/10/22   NOVEL CORONAVIRUS (COVID-19)   Result Value Ref Range    SARS-CoV-2, HUDSON Positive            Orders Placed This Encounter    eletriptan (RELPAX) 40 mg tablet     Sig: TAKE 1 TABLET BY MOUTH ONCE AS NEEDED, MAY REPEAT IN 2 HOURS IF NEEDED     Dispense:  36 Tablet     Refill:  1    topiramate (TOPAMAX) 200 mg tablet     Sig: TAKE 1 TABLET BY MOUTH IN THE AM AND 1-1/2 IN THE P.M     Dispense:  225 Tablet     Refill:  4       1. Migraine without aura and without status migrainosus, not intractable        Discussed HA and management   Keep track of symptoms   Monitor symptoms.      Keep Topamax 200/300  Use Relpax PRN   FU 6 months or sooner if needed  Get out on the boat               This note will not be viewable in [address]

## 2024-01-20 DIAGNOSIS — R60.0 LOCALIZED EDEMA: ICD-10-CM

## 2024-01-22 RX ORDER — FUROSEMIDE 20 MG/1
TABLET ORAL
Qty: 30 TABLET | Refills: 0 | Status: SHIPPED | OUTPATIENT
Start: 2024-01-22

## 2024-02-28 DIAGNOSIS — R60.0 LOCALIZED EDEMA: ICD-10-CM

## 2024-02-28 RX ORDER — FUROSEMIDE 20 MG/1
TABLET ORAL
Qty: 90 TABLET | Refills: 0 | Status: SHIPPED | OUTPATIENT
Start: 2024-02-28

## 2024-03-04 SDOH — ECONOMIC STABILITY: TRANSPORTATION INSECURITY
IN THE PAST 12 MONTHS, HAS LACK OF TRANSPORTATION KEPT YOU FROM MEETINGS, WORK, OR FROM GETTING THINGS NEEDED FOR DAILY LIVING?: PATIENT DECLINED

## 2024-03-04 SDOH — ECONOMIC STABILITY: FOOD INSECURITY: WITHIN THE PAST 12 MONTHS, THE FOOD YOU BOUGHT JUST DIDN'T LAST AND YOU DIDN'T HAVE MONEY TO GET MORE.: PATIENT DECLINED

## 2024-03-04 SDOH — ECONOMIC STABILITY: INCOME INSECURITY: HOW HARD IS IT FOR YOU TO PAY FOR THE VERY BASICS LIKE FOOD, HOUSING, MEDICAL CARE, AND HEATING?: PATIENT DECLINED

## 2024-03-04 SDOH — ECONOMIC STABILITY: HOUSING INSECURITY
IN THE LAST 12 MONTHS, WAS THERE A TIME WHEN YOU DID NOT HAVE A STEADY PLACE TO SLEEP OR SLEPT IN A SHELTER (INCLUDING NOW)?: PATIENT DECLINED

## 2024-03-04 SDOH — ECONOMIC STABILITY: FOOD INSECURITY: WITHIN THE PAST 12 MONTHS, YOU WORRIED THAT YOUR FOOD WOULD RUN OUT BEFORE YOU GOT MONEY TO BUY MORE.: PATIENT DECLINED

## 2024-03-07 ENCOUNTER — OFFICE VISIT (OUTPATIENT)
Age: 54
End: 2024-03-07
Payer: COMMERCIAL

## 2024-03-07 VITALS
OXYGEN SATURATION: 96 % | WEIGHT: 286.4 LBS | BODY MASS INDEX: 41 KG/M2 | HEIGHT: 70 IN | RESPIRATION RATE: 19 BRPM | TEMPERATURE: 98 F | DIASTOLIC BLOOD PRESSURE: 82 MMHG | SYSTOLIC BLOOD PRESSURE: 116 MMHG | HEART RATE: 82 BPM

## 2024-03-07 DIAGNOSIS — R73.01 IFG (IMPAIRED FASTING GLUCOSE): ICD-10-CM

## 2024-03-07 DIAGNOSIS — Z11.4 SCREENING FOR HIV WITHOUT PRESENCE OF RISK FACTORS: ICD-10-CM

## 2024-03-07 DIAGNOSIS — D50.8 OTHER IRON DEFICIENCY ANEMIA: Primary | ICD-10-CM

## 2024-03-07 DIAGNOSIS — G43.009 MIGRAINE WITHOUT AURA AND WITHOUT STATUS MIGRAINOSUS, NOT INTRACTABLE: ICD-10-CM

## 2024-03-07 DIAGNOSIS — E66.01 CLASS 3 SEVERE OBESITY WITHOUT SERIOUS COMORBIDITY WITH BODY MASS INDEX (BMI) OF 40.0 TO 44.9 IN ADULT, UNSPECIFIED OBESITY TYPE (HCC): ICD-10-CM

## 2024-03-07 DIAGNOSIS — D50.8 OTHER IRON DEFICIENCY ANEMIA: ICD-10-CM

## 2024-03-07 DIAGNOSIS — J01.00 ACUTE MAXILLARY SINUSITIS, RECURRENCE NOT SPECIFIED: ICD-10-CM

## 2024-03-07 DIAGNOSIS — E78.5 DYSLIPIDEMIA: ICD-10-CM

## 2024-03-07 PROBLEM — E66.9 OBESITY (BMI 35.0-39.9 WITHOUT COMORBIDITY): Status: RESOLVED | Noted: 2018-04-17 | Resolved: 2024-03-07

## 2024-03-07 LAB
COMMENT:: NORMAL
SPECIMEN HOLD: NORMAL

## 2024-03-07 PROCEDURE — 99214 OFFICE O/P EST MOD 30 MIN: CPT | Performed by: INTERNAL MEDICINE

## 2024-03-07 RX ORDER — AMOXICILLIN AND CLAVULANATE POTASSIUM 875; 125 MG/1; MG/1
1 TABLET, FILM COATED ORAL 2 TIMES DAILY
Qty: 14 TABLET | Refills: 0 | Status: SHIPPED | OUTPATIENT
Start: 2024-03-07 | End: 2024-03-14

## 2024-03-07 RX ORDER — ELETRIPTAN HYDROBROMIDE 40 MG/1
40 TABLET, FILM COATED ORAL DAILY
Qty: 36 TABLET | Refills: 3 | Status: SHIPPED | OUTPATIENT
Start: 2024-03-07

## 2024-03-07 RX ORDER — BENZONATATE 200 MG/1
200 CAPSULE ORAL 3 TIMES DAILY PRN
Qty: 30 CAPSULE | Refills: 0 | Status: SHIPPED | OUTPATIENT
Start: 2024-03-07 | End: 2024-03-17

## 2024-03-07 RX ORDER — PHENTERMINE HYDROCHLORIDE 37.5 MG/1
TABLET ORAL
Qty: 30 TABLET | Refills: 2 | Status: SHIPPED | OUTPATIENT
Start: 2024-03-07 | End: 2024-06-05

## 2024-03-07 ASSESSMENT — PATIENT HEALTH QUESTIONNAIRE - PHQ9
SUM OF ALL RESPONSES TO PHQ QUESTIONS 1-9: 1
2. FEELING DOWN, DEPRESSED OR HOPELESS: 1
SUM OF ALL RESPONSES TO PHQ9 QUESTIONS 1 & 2: 1
SUM OF ALL RESPONSES TO PHQ QUESTIONS 1-9: 1
1. LITTLE INTEREST OR PLEASURE IN DOING THINGS: 0

## 2024-03-07 NOTE — PROGRESS NOTES
Chest pain, COVID-19, Dyslipidemia, Facet hypertrophy of cervical region, Iron deficiency anemia, Migraine, Obesity (BMI 35.0-39.9 without comorbidity), Pap smear for cervical cancer screening, RAD (reactive airway disease), and Spondylolisthesis, lumbar region.     has a past surgical history that includes  section; Tubal ligation; lumbar discectomy (); and lumbar fusion (N/A, 2018).     reports that she has never smoked. She has never used smokeless tobacco. She reports current alcohol use of about 1.0 standard drink of alcohol per week. She reports that she does not use drugs.    family history includes Cancer in her paternal grandmother; Depression in her father and mother; Diabetes in her maternal grandfather; Hypertension in her maternal grandfather; No Known Problems in her sister and sister; Other in her sister; Suicide in her father.    Physical Exam   Nursing note and vitals reviewed.  Blood pressure 116/82, pulse 82, temperature 98 °F (36.7 °C), temperature source Oral, resp. rate 19, height 1.778 m (5' 10\"), weight 129.9 kg (286 lb 6.4 oz), SpO2 96 %.  Constitutional:  No distress.    Eyes: Conjunctivae are normal.   Ears:  Hearing grossly intact  Cardiovascular: Normal rate. regular rhythm, no murmurs or gallops  No edema  Pulmonary/Chest: Effort normal.   CTAB  Musculoskeletal: moves all 4 extremities   Neurological: Alert and oriented to person, place, and time.   Skin: No visible rash noted.   Psychiatric: Normal mood and affect. Behavior is normal.     Assessment and Plan    Tameka was seen today for lab collection, anemia, immunizations, cough, congestion and weight management.    Diagnoses and all orders for this visit:    Other iron deficiency anemia  Past history.  Still having periods.  She is overdue for colonoscopy and I recommend GI consultation.  -     CBC; Future  -     Ferritin; Future    Dyslipidemia  Unclear status.  Weight loss will certainly help reduce her lipids.

## 2024-03-08 ENCOUNTER — PATIENT MESSAGE (OUTPATIENT)
Age: 54
End: 2024-03-08

## 2024-03-08 DIAGNOSIS — L21.9 DERMATITIS, SEBORRHEIC: Primary | ICD-10-CM

## 2024-03-08 LAB
ALBUMIN SERPL-MCNC: 3.8 G/DL (ref 3.5–5)
ALBUMIN/GLOB SERPL: 1.2 (ref 1.1–2.2)
ALP SERPL-CCNC: 89 U/L (ref 45–117)
ALT SERPL-CCNC: 26 U/L (ref 12–78)
ANION GAP SERPL CALC-SCNC: 5 MMOL/L (ref 5–15)
AST SERPL-CCNC: 9 U/L (ref 15–37)
BILIRUB SERPL-MCNC: 0.3 MG/DL (ref 0.2–1)
BUN SERPL-MCNC: 11 MG/DL (ref 6–20)
BUN/CREAT SERPL: 13 (ref 12–20)
CALCIUM SERPL-MCNC: 9 MG/DL (ref 8.5–10.1)
CHLORIDE SERPL-SCNC: 113 MMOL/L (ref 97–108)
CHOLEST SERPL-MCNC: 251 MG/DL
CO2 SERPL-SCNC: 22 MMOL/L (ref 21–32)
CREAT SERPL-MCNC: 0.87 MG/DL (ref 0.55–1.02)
ERYTHROCYTE [DISTWIDTH] IN BLOOD BY AUTOMATED COUNT: 12.8 % (ref 11.5–14.5)
EST. AVERAGE GLUCOSE BLD GHB EST-MCNC: 111 MG/DL
FERRITIN SERPL-MCNC: 8 NG/ML (ref 26–388)
GLOBULIN SER CALC-MCNC: 3.3 G/DL (ref 2–4)
GLUCOSE SERPL-MCNC: 100 MG/DL (ref 65–100)
HBA1C MFR BLD: 5.5 % (ref 4–5.6)
HCT VFR BLD AUTO: 43.4 % (ref 35–47)
HDLC SERPL-MCNC: 44 MG/DL
HDLC SERPL: 5.7 (ref 0–5)
HGB BLD-MCNC: 13.6 G/DL (ref 11.5–16)
HIV 1+2 AB+HIV1 P24 AG SERPL QL IA: NONREACTIVE
HIV 1/2 RESULT COMMENT: NORMAL
LDLC SERPL CALC-MCNC: 157.6 MG/DL (ref 0–100)
MCH RBC QN AUTO: 28.1 PG (ref 26–34)
MCHC RBC AUTO-ENTMCNC: 31.3 G/DL (ref 30–36.5)
MCV RBC AUTO: 89.7 FL (ref 80–99)
NRBC # BLD: 0 K/UL (ref 0–0.01)
NRBC BLD-RTO: 0 PER 100 WBC
PLATELET # BLD AUTO: 307 K/UL (ref 150–400)
PMV BLD AUTO: 11.5 FL (ref 8.9–12.9)
POTASSIUM SERPL-SCNC: 4.3 MMOL/L (ref 3.5–5.1)
PROT SERPL-MCNC: 7.1 G/DL (ref 6.4–8.2)
RBC # BLD AUTO: 4.84 M/UL (ref 3.8–5.2)
SODIUM SERPL-SCNC: 140 MMOL/L (ref 136–145)
TRIGL SERPL-MCNC: 247 MG/DL
TSH SERPL DL<=0.05 MIU/L-ACNC: 1.25 UIU/ML (ref 0.36–3.74)
VLDLC SERPL CALC-MCNC: 49.4 MG/DL
WBC # BLD AUTO: 5.4 K/UL (ref 3.6–11)

## 2024-03-08 RX ORDER — HYDROCORTISONE VALERATE CREAM 2 MG/G
CREAM TOPICAL 2 TIMES DAILY
Qty: 15 G | Refills: 3 | Status: SHIPPED | OUTPATIENT
Start: 2024-03-08

## 2024-03-08 NOTE — TELEPHONE ENCOUNTER
From: Tameka Watson  To: Dr. Tenzin Vick  Sent: 3/8/2024 4:03 PM EST  Subject: Request for Rx Refill of Westcort    Hi Dr. Vick,  I hope this message finds you well. I want to thank you for your time and expertise during the office visit yesterday. Your assistance was truly appreciated.    During our discussion, I inadvertently forgot to mention that I am in need of a refill for the Westcort Cream (hydrocortisone valerate, 0.2%) I am using. Could I ask for your help with sending a refill at your convenience to the [facility] on [address] [facility]?    Also, please let me know if there are any further discussions or actions required once you've had the opportunity to review my lab results.     Thank you again for your attention to my healthcare needs.  Best regards,  Tameak

## 2024-03-14 ENCOUNTER — TELEPHONE (OUTPATIENT)
Age: 54
End: 2024-03-14

## 2024-03-14 NOTE — TELEPHONE ENCOUNTER
Patient called and stated she received a letter stating she was a no show for her last appointment and she called but she waiting on hold for a very long time and she was not feeling well.     Tameka will call back to reschedule at a later date.

## 2024-06-01 DIAGNOSIS — R60.0 LOCALIZED EDEMA: ICD-10-CM

## 2024-06-03 RX ORDER — FUROSEMIDE 20 MG/1
TABLET ORAL
Qty: 90 TABLET | Refills: 0 | Status: SHIPPED | OUTPATIENT
Start: 2024-06-03

## 2024-06-07 DIAGNOSIS — E66.01 CLASS 3 SEVERE OBESITY WITHOUT SERIOUS COMORBIDITY WITH BODY MASS INDEX (BMI) OF 40.0 TO 44.9 IN ADULT, UNSPECIFIED OBESITY TYPE (HCC): ICD-10-CM

## 2024-06-07 RX ORDER — PHENTERMINE HYDROCHLORIDE 37.5 MG/1
TABLET ORAL
Qty: 30 TABLET | Refills: 2 | Status: SHIPPED | OUTPATIENT
Start: 2024-06-07 | End: 2024-09-05

## 2024-06-07 NOTE — TELEPHONE ENCOUNTER
Received medication refill from pharmacy     Medication: phentermine (ADIPEX-P) 37.5 MG tablet     QTY: 30    Direction: Take 1/2 tablet in the morning and 1/2 tablet in the afternoon     Refills: 2     LOV: 03/07/24    Medication refill routed to provider

## 2024-09-25 ENCOUNTER — TELEPHONE (OUTPATIENT)
Age: 54
End: 2024-09-25

## 2024-10-14 ENCOUNTER — OFFICE VISIT (OUTPATIENT)
Age: 54
End: 2024-10-14
Payer: COMMERCIAL

## 2024-10-14 VITALS
BODY MASS INDEX: 41.49 KG/M2 | TEMPERATURE: 98.2 F | DIASTOLIC BLOOD PRESSURE: 86 MMHG | HEIGHT: 70 IN | OXYGEN SATURATION: 98 % | WEIGHT: 289.8 LBS | SYSTOLIC BLOOD PRESSURE: 132 MMHG | HEART RATE: 77 BPM | RESPIRATION RATE: 19 BRPM

## 2024-10-14 DIAGNOSIS — E66.01 SEVERE OBESITY (BMI >= 40): Primary | ICD-10-CM

## 2024-10-14 PROCEDURE — 99213 OFFICE O/P EST LOW 20 MIN: CPT | Performed by: INTERNAL MEDICINE

## 2024-10-14 RX ORDER — VITAMIN B COMPLEX
1 CAPSULE ORAL DAILY
COMMUNITY

## 2024-10-14 RX ORDER — TIRZEPATIDE 2.5 MG/.5ML
2.5 INJECTION, SOLUTION SUBCUTANEOUS WEEKLY
Qty: 2 ML | Refills: 0 | Status: SHIPPED | OUTPATIENT
Start: 2024-10-14

## 2024-10-14 NOTE — PROGRESS NOTES
HISTORY OF PRESENT ILLNESS    Chief Complaint   Patient presents with    Medication Check       Presents for follow-up    Severe obesity  Phentermine and topamax have not helped.    She states that she is going to gym and is exercising regularly.   States that she is participating in a relatively low fat, low caloric and low-carb diet.    says she \"does not eat a a lot\".   She cooks her own food daily.    Denies sig alcohol use and avoid sweets.      Body mass index is 41.58 kg/m².    Wt Readings from Last 3 Encounters:   10/14/24 131.5 kg (289 lb 12.8 oz)   24 129.9 kg (286 lb 6.4 oz)   10/20/22 117.9 kg (260 lb)         Review of Systems   All other systems reviewed and are negative, except as noted in HPI    Past Medical and Surgical History   has a past medical history of Chest pain, COVID-19, Dyslipidemia, Facet hypertrophy of cervical region, Iron deficiency anemia, Migraine, Obesity (BMI 35.0-39.9 without comorbidity), Pap smear for cervical cancer screening, RAD (reactive airway disease), and Spondylolisthesis, lumbar region.     has a past surgical history that includes  section; Tubal ligation; lumbar discectomy (); and lumbar fusion (N/A, 2018).     reports that she has never smoked. She has never used smokeless tobacco. She reports current alcohol use of about 1.0 standard drink of alcohol per week. She reports that she does not use drugs.    family history includes Cancer in her paternal grandmother; Depression in her father and mother; Diabetes in her maternal grandfather; Hypertension in her maternal grandfather; No Known Problems in her sister and sister; Other in her sister; Suicide in her father.    Physical Exam   Nursing note and vitals reviewed.  Blood pressure 132/86, pulse 77, temperature 98.2 °F (36.8 °C), temperature source Oral, resp. rate 19, height 1.778 m (5' 10\"), weight 131.5 kg (289 lb 12.8 oz), SpO2 98%.  Constitutional:  No distress.    Eyes:

## 2024-10-14 NOTE — PROGRESS NOTES
\"Have you been to the ER, urgent care clinic since your last visit?  Hospitalized since your last visit?\"    NO    “Have you seen or consulted any other health care providers outside our system since your last visit?”    NO    Have you had a mammogram?”   NO    No breast cancer screening on file      “Have you had a pap smear?”    NO    No cervical cancer screening on file       “Have you had a colorectal cancer screening such as a colonoscopy/FIT/Cologuard?    NO    No colonoscopy on file  No cologuard on file  No FIT/FOBT on file   No flexible sigmoidoscopy on file

## 2024-10-16 ENCOUNTER — TELEPHONE (OUTPATIENT)
Age: 54
End: 2024-10-16

## 2024-10-16 NOTE — TELEPHONE ENCOUNTER
PA SUBITTED VIA COVER MY MEDS FOR ZEPBOUND   Key: [id number]    RESPONSE: Information regarding your request  Your PA has been resolved, no additional PA is required. For further inquiries please contact the number on the back of the member prescription card.

## 2024-10-27 DIAGNOSIS — G43.009 MIGRAINE WITHOUT AURA AND WITHOUT STATUS MIGRAINOSUS, NOT INTRACTABLE: ICD-10-CM

## 2024-10-29 RX ORDER — TOPIRAMATE 200 MG/1
TABLET, FILM COATED ORAL
Qty: 225 TABLET | Refills: 1 | Status: SHIPPED | OUTPATIENT
Start: 2024-10-29

## 2024-11-25 ENCOUNTER — PATIENT MESSAGE (OUTPATIENT)
Facility: CLINIC | Age: 54
End: 2024-11-25

## 2024-11-25 DIAGNOSIS — E66.813 CLASS 3 SEVERE OBESITY WITHOUT SERIOUS COMORBIDITY WITH BODY MASS INDEX (BMI) OF 40.0 TO 44.9 IN ADULT, UNSPECIFIED OBESITY TYPE: Primary | ICD-10-CM

## 2024-11-25 DIAGNOSIS — E66.01 SEVERE OBESITY (BMI >= 40): ICD-10-CM

## 2024-11-25 DIAGNOSIS — E66.01 CLASS 3 SEVERE OBESITY WITHOUT SERIOUS COMORBIDITY WITH BODY MASS INDEX (BMI) OF 40.0 TO 44.9 IN ADULT, UNSPECIFIED OBESITY TYPE: Primary | ICD-10-CM

## 2024-11-25 RX ORDER — TIRZEPATIDE 5 MG/.5ML
5 INJECTION, SOLUTION SUBCUTANEOUS
Qty: 2 ML | Refills: 0 | Status: SHIPPED | OUTPATIENT
Start: 2024-11-25

## 2025-01-06 DIAGNOSIS — E66.01 SEVERE OBESITY (BMI >= 40): ICD-10-CM

## 2025-01-06 DIAGNOSIS — E66.01 CLASS 3 SEVERE OBESITY WITHOUT SERIOUS COMORBIDITY WITH BODY MASS INDEX (BMI) OF 40.0 TO 44.9 IN ADULT, UNSPECIFIED OBESITY TYPE: ICD-10-CM

## 2025-01-06 DIAGNOSIS — E66.813 CLASS 3 SEVERE OBESITY WITHOUT SERIOUS COMORBIDITY WITH BODY MASS INDEX (BMI) OF 40.0 TO 44.9 IN ADULT, UNSPECIFIED OBESITY TYPE: ICD-10-CM

## 2025-01-06 RX ORDER — TIRZEPATIDE 7.5 MG/.5ML
7.5 INJECTION, SOLUTION SUBCUTANEOUS WEEKLY
Qty: 2 ML | Refills: 1 | Status: SHIPPED | OUTPATIENT
Start: 2025-01-06

## 2025-01-28 ENCOUNTER — TELEPHONE (OUTPATIENT)
Facility: CLINIC | Age: 55
End: 2025-01-28

## 2025-01-28 ENCOUNTER — PATIENT MESSAGE (OUTPATIENT)
Facility: CLINIC | Age: 55
End: 2025-01-28

## 2025-01-28 RX ORDER — OSELTAMIVIR PHOSPHATE 75 MG/1
75 CAPSULE ORAL 2 TIMES DAILY
Qty: 10 CAPSULE | Refills: 0 | Status: SHIPPED | OUTPATIENT
Start: 2025-01-28 | End: 2025-02-02

## 2025-01-28 NOTE — TELEPHONE ENCOUNTER
The patient is requesting a call back to discuss recommendation due to having Chills,Cough (tickle) Extreme fatigue Headache No appetite. Psr offered an appointment she decline and stated that she will like to be advised. [phone]

## 2025-02-04 DIAGNOSIS — E66.01 SEVERE OBESITY (BMI >= 40): Primary | ICD-10-CM

## 2025-02-04 RX ORDER — TIRZEPATIDE 10 MG/.5ML
10 INJECTION, SOLUTION SUBCUTANEOUS WEEKLY
Qty: 2 ML | Refills: 0 | Status: SHIPPED | OUTPATIENT
Start: 2025-02-04

## 2025-03-04 DIAGNOSIS — G43.009 MIGRAINE WITHOUT AURA AND WITHOUT STATUS MIGRAINOSUS, NOT INTRACTABLE: ICD-10-CM

## 2025-03-04 RX ORDER — ELETRIPTAN HYDROBROMIDE 40 MG/1
40 TABLET, FILM COATED ORAL DAILY
Qty: 36 TABLET | Refills: 3 | Status: SHIPPED | OUTPATIENT
Start: 2025-03-04

## 2025-03-14 DIAGNOSIS — E66.01 CLASS 3 SEVERE OBESITY WITHOUT SERIOUS COMORBIDITY WITH BODY MASS INDEX (BMI) OF 40.0 TO 44.9 IN ADULT, UNSPECIFIED OBESITY TYPE: Primary | ICD-10-CM

## 2025-03-14 DIAGNOSIS — E66.813 CLASS 3 SEVERE OBESITY WITHOUT SERIOUS COMORBIDITY WITH BODY MASS INDEX (BMI) OF 40.0 TO 44.9 IN ADULT, UNSPECIFIED OBESITY TYPE: Primary | ICD-10-CM

## 2025-03-14 DIAGNOSIS — R73.01 IFG (IMPAIRED FASTING GLUCOSE): ICD-10-CM

## 2025-03-14 RX ORDER — METFORMIN HYDROCHLORIDE 500 MG/1
1000 TABLET, EXTENDED RELEASE ORAL
Qty: 60 TABLET | Refills: 2 | Status: SHIPPED | OUTPATIENT
Start: 2025-03-14

## 2025-03-14 RX ORDER — TIRZEPATIDE 12.5 MG/.5ML
12.5 INJECTION, SOLUTION SUBCUTANEOUS WEEKLY
Qty: 2 ML | Refills: 0 | Status: ACTIVE | OUTPATIENT
Start: 2025-03-14

## 2025-04-23 DIAGNOSIS — E66.813 CLASS 3 SEVERE OBESITY WITHOUT SERIOUS COMORBIDITY WITH BODY MASS INDEX (BMI) OF 40.0 TO 44.9 IN ADULT, UNSPECIFIED OBESITY TYPE (HCC): Primary | ICD-10-CM

## 2025-04-23 RX ORDER — TIRZEPATIDE 15 MG/.5ML
15 INJECTION, SOLUTION SUBCUTANEOUS WEEKLY
Qty: 2 ML | Refills: 3 | Status: ACTIVE | OUTPATIENT
Start: 2025-04-23

## 2025-04-24 DIAGNOSIS — G43.009 MIGRAINE WITHOUT AURA AND WITHOUT STATUS MIGRAINOSUS, NOT INTRACTABLE: ICD-10-CM

## 2025-04-24 RX ORDER — TOPIRAMATE 200 MG/1
TABLET, FILM COATED ORAL
Qty: 225 TABLET | Refills: 1 | Status: SHIPPED | OUTPATIENT
Start: 2025-04-24

## 2025-05-27 DIAGNOSIS — E66.813 CLASS 3 SEVERE OBESITY WITHOUT SERIOUS COMORBIDITY WITH BODY MASS INDEX (BMI) OF 40.0 TO 44.9 IN ADULT, UNSPECIFIED OBESITY TYPE (HCC): ICD-10-CM

## 2025-05-27 RX ORDER — TIRZEPATIDE 15 MG/.5ML
15 INJECTION, SOLUTION SUBCUTANEOUS WEEKLY
Qty: 2 ML | Refills: 3 | Status: ACTIVE | OUTPATIENT
Start: 2025-05-27

## 2025-05-27 NOTE — TELEPHONE ENCOUNTER
PCP: Tenzin Vick MD    Last Appointment: Visit date not found    No future appointments.    Requested Prescriptions     Pending Prescriptions Disp Refills    ZEPBOUND 15 MG/0.5ML SOAJ subCUTAneous auto-injector pen 2 mL 3     Sig: Inject 15 mg into the skin once a week       LAST ORDERED: 4/23/25     Hi Dr. Vick, could you please refill Zepbound 15 mg/0.5 mL. I've lost ~10 lbs this past month. There were no poor side-effects. Thank you. Here’s a quick snapshot:  4/27: 262.8  5/3: 262.0  5/10: 259.5  5/17: 256.6  5/24: 252.2 (current)       Rosaline \"Jovi\" THANH Mcdonough

## 2025-06-10 DIAGNOSIS — R73.01 IFG (IMPAIRED FASTING GLUCOSE): Primary | ICD-10-CM

## 2025-06-10 RX ORDER — METFORMIN HYDROCHLORIDE 500 MG/1
1000 TABLET, EXTENDED RELEASE ORAL
Qty: 180 TABLET | Refills: 1 | Status: SHIPPED | OUTPATIENT
Start: 2025-06-10

## 2025-07-07 ENCOUNTER — PATIENT MESSAGE (OUTPATIENT)
Facility: CLINIC | Age: 55
End: 2025-07-07

## 2025-07-07 DIAGNOSIS — E66.813 CLASS 3 SEVERE OBESITY WITHOUT SERIOUS COMORBIDITY WITH BODY MASS INDEX (BMI) OF 40.0 TO 44.9 IN ADULT, UNSPECIFIED OBESITY TYPE (HCC): ICD-10-CM

## 2025-07-14 ENCOUNTER — OFFICE VISIT (OUTPATIENT)
Facility: CLINIC | Age: 55
End: 2025-07-14
Payer: COMMERCIAL

## 2025-07-14 VITALS
TEMPERATURE: 98 F | HEIGHT: 70 IN | DIASTOLIC BLOOD PRESSURE: 77 MMHG | RESPIRATION RATE: 15 BRPM | WEIGHT: 252.4 LBS | BODY MASS INDEX: 36.13 KG/M2 | OXYGEN SATURATION: 98 % | SYSTOLIC BLOOD PRESSURE: 116 MMHG | HEART RATE: 64 BPM

## 2025-07-14 DIAGNOSIS — Z00.00 PREVENTATIVE HEALTH CARE: ICD-10-CM

## 2025-07-14 DIAGNOSIS — G43.009 MIGRAINE WITHOUT AURA AND WITHOUT STATUS MIGRAINOSUS, NOT INTRACTABLE: ICD-10-CM

## 2025-07-14 DIAGNOSIS — E66.9 OBESITY (BMI 30-39.9): Primary | ICD-10-CM

## 2025-07-14 PROCEDURE — 99213 OFFICE O/P EST LOW 20 MIN: CPT | Performed by: INTERNAL MEDICINE

## 2025-07-14 RX ORDER — TIRZEPATIDE 15 MG/.5ML
15 INJECTION, SOLUTION SUBCUTANEOUS WEEKLY
Qty: 2 ML | Refills: 3 | Status: ACTIVE | OUTPATIENT
Start: 2025-07-14

## 2025-07-14 SDOH — ECONOMIC STABILITY: FOOD INSECURITY: WITHIN THE PAST 12 MONTHS, THE FOOD YOU BOUGHT JUST DIDN'T LAST AND YOU DIDN'T HAVE MONEY TO GET MORE.: NEVER TRUE

## 2025-07-14 SDOH — ECONOMIC STABILITY: FOOD INSECURITY: WITHIN THE PAST 12 MONTHS, YOU WORRIED THAT YOUR FOOD WOULD RUN OUT BEFORE YOU GOT MONEY TO BUY MORE.: NEVER TRUE

## 2025-07-14 ASSESSMENT — PATIENT HEALTH QUESTIONNAIRE - PHQ9
SUM OF ALL RESPONSES TO PHQ QUESTIONS 1-9: 0
SUM OF ALL RESPONSES TO PHQ QUESTIONS 1-9: 0
2. FEELING DOWN, DEPRESSED OR HOPELESS: NOT AT ALL
1. LITTLE INTEREST OR PLEASURE IN DOING THINGS: NOT AT ALL
SUM OF ALL RESPONSES TO PHQ QUESTIONS 1-9: 0
SUM OF ALL RESPONSES TO PHQ QUESTIONS 1-9: 0

## 2025-07-14 NOTE — PROGRESS NOTES
Identified pt with two pt identifiers(name and ). Reviewed record in preparation for visit and have obtained necessary documentation. All patient medications has been reviewed.  Chief Complaint   Patient presents with    Weight Management       Health Maintenance Due   Topic    Hepatitis B vaccine (1 of 3 - 19+ 3-dose series)    Cervical cancer screen     Breast cancer screen     Colorectal Cancer Screen     Shingles vaccine (1 of 2)    Pneumococcal 50+ years Vaccine (1 of 1 - PCV)    DTaP/Tdap/Td vaccine (2 - Td or Tdap)    COVID-19 Vaccine (3 - 2024-25 season)    Depression Screen      Health Maintenance Review: Patient reminded of \"due or due soon\" health maintenance. I have asked the patient to contact his/her primary care provider (PCP) for follow-up on his/her health maintenance.    Wt Readings from Last 3 Encounters:   25 114.5 kg (252 lb 6.4 oz)   10/14/24 131.5 kg (289 lb 12.8 oz)   24 129.9 kg (286 lb 6.4 oz)     Temp Readings from Last 3 Encounters:   10/14/24 98.2 °F (36.8 °C) (Oral)   24 98 °F (36.7 °C) (Oral)     BP Readings from Last 3 Encounters:   10/14/24 132/86   24 116/82   23 126/84     Pulse Readings from Last 3 Encounters:   10/14/24 77   24 82   23 81       1. \"Have you been to the ER, urgent care clinic since your last visit?  Hospitalized since your last visit?\" No    2. \"Have you seen or consulted any other health care providers outside of the [facility] since your last visit?\" No     3. For patients aged 45-75: Has the patient had a colonoscopy / FIT/ Cologuard? No    If the patient is female:    4. For patients aged 40-74: Has the patient had a mammogram within the past 2 years? No    5. For patients aged 21-65: Has the patient had a pap smear? No    Patient is accompanied by  I have received verbal consent from Tameka Watson to discuss any/all medical information while they are present in the room.

## 2025-07-15 ENCOUNTER — TELEPHONE (OUTPATIENT)
Facility: CLINIC | Age: 55
End: 2025-07-15

## 2025-07-15 LAB
ALBUMIN SERPL-MCNC: 3.8 G/DL (ref 3.5–5)
ALBUMIN/GLOB SERPL: 1.4 (ref 1.1–2.2)
ALP SERPL-CCNC: 80 U/L (ref 45–117)
ALT SERPL-CCNC: 21 U/L (ref 12–78)
ANION GAP SERPL CALC-SCNC: 9 MMOL/L (ref 2–12)
AST SERPL-CCNC: 15 U/L (ref 15–37)
BILIRUB SERPL-MCNC: 0.2 MG/DL (ref 0.2–1)
BUN SERPL-MCNC: 14 MG/DL (ref 6–20)
BUN/CREAT SERPL: 18 (ref 12–20)
CALCIUM SERPL-MCNC: 9.1 MG/DL (ref 8.5–10.1)
CHLORIDE SERPL-SCNC: 109 MMOL/L (ref 97–108)
CHOLEST SERPL-MCNC: 192 MG/DL
CO2 SERPL-SCNC: 20 MMOL/L (ref 21–32)
CREAT SERPL-MCNC: 0.78 MG/DL (ref 0.55–1.02)
ERYTHROCYTE [DISTWIDTH] IN BLOOD BY AUTOMATED COUNT: 13.1 % (ref 11.5–14.5)
EST. AVERAGE GLUCOSE BLD GHB EST-MCNC: 100 MG/DL
GLOBULIN SER CALC-MCNC: 2.8 G/DL (ref 2–4)
GLUCOSE SERPL-MCNC: 95 MG/DL (ref 65–100)
HBA1C MFR BLD: 5.1 % (ref 4–5.6)
HCT VFR BLD AUTO: 36.4 % (ref 35–47)
HDLC SERPL-MCNC: 43 MG/DL
HDLC SERPL: 4.5 (ref 0–5)
HGB BLD-MCNC: 11.4 G/DL (ref 11.5–16)
LDLC SERPL CALC-MCNC: 122.2 MG/DL (ref 0–100)
MCH RBC QN AUTO: 27.5 PG (ref 26–34)
MCHC RBC AUTO-ENTMCNC: 31.3 G/DL (ref 30–36.5)
MCV RBC AUTO: 87.7 FL (ref 80–99)
NRBC # BLD: 0 K/UL (ref 0–0.01)
NRBC BLD-RTO: 0 PER 100 WBC
PLATELET # BLD AUTO: 258 K/UL (ref 150–400)
PMV BLD AUTO: 11.7 FL (ref 8.9–12.9)
POTASSIUM SERPL-SCNC: 4.1 MMOL/L (ref 3.5–5.1)
PROT SERPL-MCNC: 6.6 G/DL (ref 6.4–8.2)
RBC # BLD AUTO: 4.15 M/UL (ref 3.8–5.2)
SODIUM SERPL-SCNC: 138 MMOL/L (ref 136–145)
TRIGL SERPL-MCNC: 134 MG/DL
VLDLC SERPL CALC-MCNC: 26.8 MG/DL
WBC # BLD AUTO: 4.5 K/UL (ref 3.6–11)

## 2025-07-15 NOTE — PROGRESS NOTES
HISTORY OF PRESENT ILLNESS    Chief Complaint   Patient presents with    Weight Management       History of Present Illness  The patient is a 55-year-old woman who presents for a follow-up of obesity.     She was seen in October 2024, and her weight was 289 pounds w BMI 41.58  Her weight today is 252 pounds, for a total weight loss of 37 pounds on our scale.   She has been on Zepbound therapy for weight management since that time after having failed trials of phentermine and topiramate.  She has been maintaining regular contact via [employer] regarding her progress with Zepbound. Initially, she experienced difficulty adjusting to the 15 mg dose, feeling unwell after the first few doses. She has taken 4 doses in total but has not administered a dose in the past 2 weeks due to need for rx renewal.   The first 3 doses were particularly challenging, causing her to feel nauseous and fatigued.   She also experienced difficulty brushing her teeth, similar to when she was pregnant.   However, she has since acclimated to the medication, although it still induces fatigue.   She realized that her lack of appetite was leading to inadequate food intake, contributing to her fatigue. After increasing her food intake, she noticed an improvement in her strength and energy levels.   She continues to take metformin twice daily (added 3/14/25) and topiramate.   In the past, she found that phentermine was initially effective, but its efficacy seemed to plateau over time.    She engages in regular exercise and outdoor activities and has been particularly active recently due to home remodeling projects. Her sleep pattern is consistent, typically sleeping from 10:30 PM to 5:30 AM or 6:00 AM. She reports no palpitations or abdominal pain.    She reports that her chronic headaches are well-managed with topiramate and have not interfered with her work.  Takes Relpax as needed migraines.    Sleep: She typically sleeps from 10:30 PM to 5:30 AM or

## 2025-07-21 ENCOUNTER — RESULTS FOLLOW-UP (OUTPATIENT)
Facility: CLINIC | Age: 55
End: 2025-07-21

## 2025-07-21 DIAGNOSIS — Z12.11 SCREEN FOR COLON CANCER: ICD-10-CM

## 2025-07-21 DIAGNOSIS — D50.8 OTHER IRON DEFICIENCY ANEMIA: ICD-10-CM

## 2025-07-21 DIAGNOSIS — D50.8 OTHER IRON DEFICIENCY ANEMIA: Primary | ICD-10-CM

## 2025-07-22 NOTE — RESULT ENCOUNTER NOTE
Results reviewed.  Please refer to MyChart comments.    Sharmila, please leave FIT test envelope with my order for her to .

## 2025-07-22 NOTE — TELEPHONE ENCOUNTER
----- Message from Dr. Tenzin Vick MD sent at 7/21/2025  9:34 PM EDT -----  Results reviewed.  Please refer to MyChart comments.    Sharmila, please leave FIT test envelope with my order for her to .

## 2025-07-22 NOTE — TELEPHONE ENCOUNTER
Spoke with patient  and updates that a Fit Test has been left at reception for her to . She states she will be by in a couple of days to  Grateful for the call.

## 2025-07-29 DIAGNOSIS — E66.9 OBESITY (BMI 30-39.9): ICD-10-CM

## 2025-07-29 LAB — HEMOCCULT STL QL IA: NEGATIVE

## 2025-07-29 RX ORDER — TIRZEPATIDE 15 MG/.5ML
15 INJECTION, SOLUTION SUBCUTANEOUS WEEKLY
Qty: 2 ML | Refills: 3 | Status: ACTIVE | OUTPATIENT
Start: 2025-07-29

## 2025-08-05 ENCOUNTER — TRANSCRIBE ORDERS (OUTPATIENT)
Facility: HOSPITAL | Age: 55
End: 2025-08-05

## 2025-08-05 ENCOUNTER — HOSPITAL ENCOUNTER (OUTPATIENT)
Facility: HOSPITAL | Age: 55
Discharge: HOME | End: 2025-08-08
Payer: COMMERCIAL

## 2025-08-05 VITALS — WEIGHT: 252 LBS | BODY MASS INDEX: 36.08 KG/M2 | HEIGHT: 70 IN

## 2025-08-05 DIAGNOSIS — Z12.31 OTHER SCREENING MAMMOGRAM: Primary | ICD-10-CM

## 2025-08-05 DIAGNOSIS — Z12.31 OTHER SCREENING MAMMOGRAM: ICD-10-CM

## 2025-08-05 PROCEDURE — 77063 BREAST TOMOSYNTHESIS BI: CPT

## 2025-09-04 ENCOUNTER — HOSPITAL ENCOUNTER (OUTPATIENT)
Facility: HOSPITAL | Age: 55
End: 2025-09-04
Payer: COMMERCIAL

## 2025-09-04 ENCOUNTER — HOSPITAL ENCOUNTER (OUTPATIENT)
Facility: HOSPITAL | Age: 55
Discharge: HOME | End: 2025-09-04
Payer: COMMERCIAL

## 2025-09-04 VITALS — HEIGHT: 70 IN | WEIGHT: 252 LBS | BODY MASS INDEX: 36.08 KG/M2

## 2025-09-04 DIAGNOSIS — R92.8 ABNORMAL MAMMOGRAM OF RIGHT BREAST: ICD-10-CM

## 2025-09-04 PROCEDURE — G0279 TOMOSYNTHESIS, MAMMO: HCPCS

## 2025-09-04 PROCEDURE — 76642 ULTRASOUND BREAST LIMITED: CPT

## (undated) DEVICE — STERILE POLYISOPRENE POWDER-FREE SURGICAL GLOVES WITH EMOLLIENT COATING: Brand: PROTEXIS

## (undated) DEVICE — TIP CLEANER: Brand: VALLEYLAB

## (undated) DEVICE — KENDALL SCD EXPRESS SLEEVES, KNEE LENGTH, MEDIUM: Brand: KENDALL SCD

## (undated) DEVICE — SUTURE VCRL SZ 0 L36IN ABSRB UD L40MM CT 1/2 CIR TAPERPOINT J958H

## (undated) DEVICE — (D)PREP SKN CHLRAPRP APPL 26ML -- CONVERT TO ITEM 371833

## (undated) DEVICE — APPLIER LIG CLP M L11IN TI STR RNG HNDL FOR 20 CLP DISP

## (undated) DEVICE — DRAPE XR C ARM 41X74IN LF --

## (undated) DEVICE — SUTURE VCRL SZ 1 L36IN ABSRB UD CTX L48MM 1/2 CIR J977H

## (undated) DEVICE — LIGHT HANDLE: Brand: DEVON

## (undated) DEVICE — POOLE SUCTION INSTRUMENT WITH REMOVABLE SHEATH: Brand: POOLE

## (undated) DEVICE — MAGNETIC DRAPE: Brand: DEVON

## (undated) DEVICE — SPONGE LAP 18X18IN STRL -- 5/PK

## (undated) DEVICE — YANKAUER OPEN TIP, NO VENT: Brand: ARGYLE

## (undated) DEVICE — BLADE ELECTRODE: Brand: EDGE

## (undated) DEVICE — SLIM BODY SKIN STAPLER: Brand: APPOSE ULC

## (undated) DEVICE — HANDPIECE LAP L23MM DIA5MM VES SEAL CUT CRV JAW PSTL GRP

## (undated) DEVICE — SUTURE PDS II SZ 1 L36IN ABSRB VLT L48MM CTX 1/2 CIR Z371T

## (undated) DEVICE — SPONGE: SPECIALTY PEANUT XR 100/CS: Brand: MEDICAL ACTION INDUSTRIES

## (undated) DEVICE — 1010 S-DRAPE TOWEL DRAPE 10/BX: Brand: STERI-DRAPE™

## (undated) DEVICE — TRAY CATH OD16FR SIL URIN M STATLOK STBL DEV SURSTP

## (undated) DEVICE — SUTURE PERMAHAND SZ 2-0 L30IN NONABSORBABLE BLK SILK W/O A305H

## (undated) DEVICE — DRAPE,REIN 53X77,STERILE: Brand: MEDLINE

## (undated) DEVICE — TOWEL SURG W17XL27IN STD BLU COT NONFENESTRATED PREWASHED

## (undated) DEVICE — LAMINECTOMY RICHMOND-LF: Brand: MEDLINE INDUSTRIES, INC.

## (undated) DEVICE — ASTOUND STANDARD SURGICAL GOWN, XXL: Brand: CONVERTORS

## (undated) DEVICE — SOLUTION IV 1000ML 0.9% SOD CHL

## (undated) DEVICE — STERILE POLYISOPRENE POWDER-FREE SURGICAL GLOVES: Brand: PROTEXIS

## (undated) DEVICE — SOLUTION IRRIG 1000ML H2O STRL BLT

## (undated) DEVICE — 3000CC GUARDIAN II: Brand: GUARDIAN

## (undated) DEVICE — NDL SPNE QNCKE 18GX3.5IN LF --

## (undated) DEVICE — INFECTION CONTROL KIT SYS

## (undated) DEVICE — SUTURE PERMAHAND SZ 3-0 L30IN NONABSORBABLE BLK SILK BRAID A304H

## (undated) DEVICE — SUTURE VCRL SZ 2-0 L36IN ABSRB UD L40MM CT 1/2 CIR J957H

## (undated) DEVICE — SUT SLK 2-0SH 30IN BLK --

## (undated) DEVICE — DEVON™ KNEE AND BODY STRAP 60" X 3" (1.5 M X 7.6 CM): Brand: DEVON

## (undated) DEVICE — SUTURE VCRL SZ 3-0 L27IN ABSRB UD L26MM SH 1/2 CIR J416H

## (undated) DEVICE — BIPOLAR FORCEPS CORD,BANANA LEADS: Brand: VALLEYLAB

## (undated) DEVICE — TELFA NON-ADHERENT ABSORBENT DRESSING: Brand: TELFA

## (undated) DEVICE — ASTOUND STANDARD SURGICAL GOWN, XL: Brand: CONVERTORS

## (undated) DEVICE — COVER,TABLE,60X90,STERILE: Brand: MEDLINE